# Patient Record
Sex: FEMALE | Race: WHITE | NOT HISPANIC OR LATINO | Employment: OTHER | URBAN - METROPOLITAN AREA
[De-identification: names, ages, dates, MRNs, and addresses within clinical notes are randomized per-mention and may not be internally consistent; named-entity substitution may affect disease eponyms.]

---

## 2017-01-05 ENCOUNTER — APPOINTMENT (OUTPATIENT)
Dept: PHARMACY | Facility: HOSPITAL | Age: 82
End: 2017-01-05

## 2017-01-12 ENCOUNTER — ANTICOAGULATION VISIT (OUTPATIENT)
Dept: PHARMACY | Facility: HOSPITAL | Age: 82
End: 2017-01-12

## 2017-01-12 DIAGNOSIS — I48.91 ATRIAL FIBRILLATION, UNSPECIFIED TYPE (HCC): ICD-10-CM

## 2017-01-12 LAB — INR PPP: 1.7 (ref 0.9–1.1)

## 2017-01-12 PROCEDURE — G0463 HOSPITAL OUTPT CLINIC VISIT: HCPCS

## 2017-01-12 PROCEDURE — 36416 COLLJ CAPILLARY BLOOD SPEC: CPT

## 2017-01-12 PROCEDURE — 85610 PROTHROMBIN TIME: CPT

## 2017-01-12 NOTE — PROGRESS NOTES
Anticoagulation Clinic Progress Note  Indication: afib  Referring Provider: Eddie  Goal INR: 2-3  Current Drug Interactions: levothyroxine      Anticoagulation Clinic INR History:  Date 9/1 9/29 10/6 10/31 11/15 11/22 12/13 1/12   Total Weekly Dose 9mg 9mg 8mg 14mg 10.5 mg 5mg (3 held doses) 8mg (1 missed dose) 8mg   INR 2.4 3.8 2.2 3.7 (outside facility) 5.8, 5.7 2.8 2.1 1.7     Date           Total Weekly Dose 9mg          INR             Clinic Interview:  Tablet Strength: pt has 1mg tablets  Current Dose: pt's daughter verified 1mg daily except 2mg Saturday  Patient Findings      Negatives Signs/symptoms of thrombosis, Signs/symptoms of bleeding, Laboratory test error suspected, Change in health, Change in alcohol use, Change in activity, Upcoming invasive procedure, Emergency department visit, Upcoming dental procedure, Missed doses, Extra doses, Change in medications, Change in diet/appetite, Hospital admission, Bruising, Other complaints         Plan:  1. INR is subtherapeutic today. Will instruct pt to BOOST her dose today with 1.5mg, then increase dose to 1mg daily except 2mg MonFri.  2. RTC in 2 weeks.  3. No recent changes to medications.  4. Verbal and written information provided. Pt and daughter express understanding and have no further questions at this time.    Ann Cowden, PharmD  1/12/2017  11:23 AM

## 2017-01-12 NOTE — MR AVS SNAPSHOT
Sarah Kennedy   1/12/2017 11:30 AM   Anticoagulation Visit    Dept Phone:  [phone]   Encounter #:  [id number]    Provider:  [facility]   Department:  [facility] ANTICOAGULATION CLINIC                Your Full Care Plan              Your Updated Medication List          This list is accurate as of: 1/12/17 11:31 AM.  Always use your most recent med list.                acetaminophen 325 MG tablet   Commonly known as:  TYLENOL   Take 2 tablets by mouth Every 4 (Four) Hours As Needed for mild pain (1-3).       aspirin 81 MG chewable tablet   Chew 1 tablet Daily.       carvedilol 6.25 MG tablet   Commonly known as:  COREG   Take 1 tablet by mouth Every 12 (Twelve) Hours.       DAILY MULTIVITAMIN PO       gabapentin 100 MG capsule   Commonly known as:  NEURONTIN       levothyroxine 75 MCG tablet   Commonly known as:  SYNTHROID, LEVOTHROID       lisinopril 2.5 MG tablet   Commonly known as:  PRINIVIL,ZESTRIL       rosuvastatin 20 MG tablet   Commonly known as:  CRESTOR   Take 1 tablet by mouth Every Night.       sennosides-docusate sodium 8.6-50 MG tablet   Commonly known as:  SENOKOT-S   Take 2 tablets by mouth 2 (Two) Times a Day As Needed for constipation.       thiamine 100 MG tablet   Commonly known as:  VITAMIN B-1       torsemide 10 MG tablet   Commonly known as:  DEMADEX   Take 1 tablet by mouth Daily.       warfarin 1 MG tablet   Commonly known as:  COUMADIN               We Performed the Following     POC INR       You Were Diagnosed With        Codes Comments    Atrial fibrillation, unspecified type     ICD-10-CM: I48.91  ICD-9-CM: 427.31       Instructions     None    Patient Instructions History      Anticoagulation Summary as of 1/12/2017     INR goal 2.0-3.0   Today's INR 1.70!   Next INR check 1/26/2017    Indications   Atrial fibrillation [I48.91] [I48.91]         January 2017 Details    Sun Mon Tue Wed Thu Fri Sat     1               2               3                  4               5               6               7                 8               9               10               11               12      1.5 mg   See details      13      2 mg         14      1 mg           15      1 mg         16      2 mg         17      1 mg         18      1 mg         19      1 mg         20      2 mg         21      1 mg           22      1 mg         23      2 mg         24      1 mg         25      1 mg         26      1 mg         27               28                 29               30               31                    Date Details    This INR check       Date of next INR:  2017           Upcoming Appointments     Visit Type Date Time Department    ANTI [facility] - FOLLOW UP 2017 11:30 AM  [facility]    [facility] - FOLLOW UP 2017 11:00 AM  [facility]    FOLLOW UP 2017  1:30 PM [facility]      Mobile [employer] Signup     [facility] [employer] allows you to send messages to your doctor, view your test results, renew your prescriptions, schedule appointments, and more. To sign up, go to [email or web address] and click on the Sign Up Now link in the New User? box. Enter your [employer] Activation Code exactly as it appears below along with the last four digits of your Social Security Number and your Date of Birth () to complete the sign-up process. If you do not sign up before the expiration date, you must request a new code.    [employer] Activation Code: [email or web address]  Expires: 2017 11:31 AM    If you have questions, you can email [email or web address] or call [phone] to talk to our [employer] staff. Remember, [employer] is NOT to be used for urgent needs. For medical emergencies, dial 911.               Other Info from Your Visit           Your Appointments     2017 11:00 AM EST   Anti Coag - Follow Up with ANTI COAG CLINIC   [facility] ANTICOAGULATION CLINIC (--)    [address]  [facility] [address]   [phone]            Jun 08, 2017  1:30 PM EDT   Follow Up with Jennifer Morgan MD   [facility] CARDIOLOGY (--)    [address]  [facility] [address]   [phone]           Arrive 15 minutes prior to appointment.              Allergies     Atorvastatin        Vital Signs     Smoking Status                   Never Smoker           Results     POC INR      Component Value Standard Range & Units    INR 1.70 0.9 - 1.1

## 2017-01-26 ENCOUNTER — ANTICOAGULATION VISIT (OUTPATIENT)
Dept: PHARMACY | Facility: HOSPITAL | Age: 82
End: 2017-01-26

## 2017-01-26 DIAGNOSIS — I48.91 ATRIAL FIBRILLATION, UNSPECIFIED TYPE (HCC): Primary | ICD-10-CM

## 2017-01-26 LAB — INR PPP: 3 (ref 0.9–1.1)

## 2017-01-26 PROCEDURE — G0463 HOSPITAL OUTPT CLINIC VISIT: HCPCS

## 2017-01-26 PROCEDURE — 85610 PROTHROMBIN TIME: CPT

## 2017-01-26 PROCEDURE — 36416 COLLJ CAPILLARY BLOOD SPEC: CPT

## 2017-01-26 NOTE — MR AVS SNAPSHOT
Sarah Kennedy   1/26/2017 11:00 AM   Anticoagulation Visit    Dept Phone:  [phone]   Encounter #:  [id number]    Provider:  [facility]   Department:  [facility] ANTICOAGULATION CLINIC                Your Full Care Plan              Your Updated Medication List          This list is accurate as of: 1/26/17 11:30 AM.  Always use your most recent med list.                acetaminophen 325 MG tablet   Commonly known as:  TYLENOL   Take 2 tablets by mouth Every 4 (Four) Hours As Needed for mild pain (1-3).       aspirin 81 MG chewable tablet   Chew 1 tablet Daily.       carvedilol 6.25 MG tablet   Commonly known as:  COREG   Take 1 tablet by mouth Every 12 (Twelve) Hours.       DAILY MULTIVITAMIN PO       gabapentin 100 MG capsule   Commonly known as:  NEURONTIN       levothyroxine 75 MCG tablet   Commonly known as:  SYNTHROID, LEVOTHROID       lisinopril 2.5 MG tablet   Commonly known as:  PRINIVIL,ZESTRIL       rosuvastatin 20 MG tablet   Commonly known as:  CRESTOR   Take 1 tablet by mouth Every Night.       sennosides-docusate sodium 8.6-50 MG tablet   Commonly known as:  SENOKOT-S   Take 2 tablets by mouth 2 (Two) Times a Day As Needed for constipation.       thiamine 100 MG tablet   Commonly known as:  VITAMIN B-1       torsemide 10 MG tablet   Commonly known as:  DEMADEX   Take 1 tablet by mouth Daily.       warfarin 1 MG tablet   Commonly known as:  COUMADIN               We Performed the Following     POC INR       You Were Diagnosed With        Codes Comments    Atrial fibrillation, unspecified type    -  Primary ICD-10-CM: I48.91  ICD-9-CM: 427.31       Instructions     None    Patient Instructions History      Anticoagulation Summary as of 1/26/2017     INR goal 2.0-3.0   Today's INR 3.00   Next INR check 2/16/2017    Indications   Atrial fibrillation [I48.91] [I48.91]         January 2017 Details    Sun Mon Tue Wed Thu Fri Sat     1               2               3               4               5               6               7                 8               9               10               11               12               13               14                 15               16               17               18               19               20               21                 22               23               24               25               26      1 mg   See details      27      2 mg         28      1 mg           29      1 mg         30      2 mg         31      1 mg              Date Details    This INR check                 2017 Details    Sun Mon Tue Wed Thu Fri Sat        1      1 mg         2      1 mg         3      2 mg         4      1 mg           5      1 mg         6      2 mg         7      1 mg         8      1 mg         9      1 mg         10      2 mg         11      1 mg           12      1 mg         13      2 mg         14      1 mg         15      1 mg         16      1 mg         17               18                 19               20               21               22               23               24               25                 26               27               28                    Date Details   No additional details    Date of next INR:  2017           Upcoming Appointments     Visit Type Date Time Department    ANT[facility] - FOLLOW UP 2017 11:00 AM  [facility]    [facility] - FOLLOW UP 2017  1:30 PM  [facility]    FOLLOW UP 2017  1:30 PM [facility]      [employer] Signup     [facility] [employer] allows you to send messages to your doctor, view your test results, renew your prescriptions, schedule appointments, and more. To sign up, go to [employer] and click on the Sign Up Now link in the New User? box. Enter your [employer] Activation Code exactly as it appears below along with the last four digits of your Social Security Number and your Date of Birth () to  complete the sign-up process. If you do not sign up before the expiration date, you must request a new code.    [employer] Activation Code: [email or web address]  Expires: 1/26/2017 11:31 AM    If you have questions, you can email [email or web address] or call [phone] to talk to our [employer] staff. Remember, [employer] is NOT to be used for urgent needs. For medical emergencies, dial 911.               Other Info from Your Visit           Your Appointments     Feb 16, 2017  1:30 PM EST   Anti Coag - Follow Up with ANTI COAG CLINIC   [facility] ANTICOAGULATION CLINIC (--)    [address]  [address]  [facility] [address]   [phone]            Jun 08, 2017  1:30 PM EDT   Follow Up with Jennifer Morgan MD   [facility] [place] [facility]ARDIOLOGY (--)    [address]  [facility] [address]   [phone]           Arrive 15 minutes prior to appointment.              Allergies     Atorvastatin        Vital Signs     Smoking Status                   Never Smoker           Results     POC INR      Component Value Standard Range & Units    INR 3.00 0.9 - 1.1

## 2017-01-26 NOTE — PROGRESS NOTES
Anticoagulation Clinic Progress Note  Indication: afib  Referring Provider: Eddie  Goal INR: 2-3  Current Drug Interactions: levothyroxine      Anticoagulation Clinic INR History:  Date 9/1 9/29 10/6 10/31 11/15 11/22 12/13 1/12   Total Weekly Dose 9mg 9mg 8mg 14mg 10.5 mg 5mg (3 held doses) 8mg (1 missed dose) 8mg   INR 2.4 3.8 2.2 3.7 (outside facility) 5.8, 5.7 2.8 2.1 1.7     Date 1/26          Total Weekly Dose 9mg          INR 3.0            Clinic Interview:  Tablet Strength: pt has 1mg tablets  Current Dose: pt's daughter verified 1mg daily except 2mg Saturday  Patient Findings       Negatives Signs/symptoms of thrombosis, Signs/symptoms of bleeding, Laboratory test error suspected, Change in health, Change in alcohol use, Change in activity, Upcoming invasive procedure, Emergency department visit, Upcoming dental procedure, Missed doses, Extra doses, Change in medications, Change in diet/appetite, Hospital admission, Bruising, Other complaints           Plan:  1. INR is therapeutic today. Instruct pt to continue 1mg daily except 2mg MonFri. Gave her information regarding vit K foods. Pt may have one more serving of low Vit K food per week. She tries to keep diet consistent.   2. RTC in 2 weeks.  3. No recent changes to medications.  4. Verbal and written information provided. Pt and daughter express understanding and have no further questions at this time.    Gloria Jimenez, PharmD  1/26/2017  11:22 AM

## 2017-02-06 RX ORDER — WARFARIN SODIUM 1 MG/1
TABLET ORAL
Qty: 45 TABLET | Refills: 2 | Status: SHIPPED | OUTPATIENT
Start: 2017-02-06 | End: 2017-06-02 | Stop reason: SDUPTHER

## 2017-02-16 ENCOUNTER — APPOINTMENT (OUTPATIENT)
Dept: PHARMACY | Facility: HOSPITAL | Age: 82
End: 2017-02-16

## 2017-02-16 ENCOUNTER — ANTICOAGULATION VISIT (OUTPATIENT)
Dept: PHARMACY | Facility: HOSPITAL | Age: 82
End: 2017-02-16

## 2017-02-16 DIAGNOSIS — I48.91 ATRIAL FIBRILLATION, UNSPECIFIED TYPE (HCC): ICD-10-CM

## 2017-02-16 LAB — INR PPP: 2 (ref 0.9–1.1)

## 2017-02-16 PROCEDURE — G0463 HOSPITAL OUTPT CLINIC VISIT: HCPCS

## 2017-02-16 PROCEDURE — 85610 PROTHROMBIN TIME: CPT

## 2017-02-16 PROCEDURE — 36416 COLLJ CAPILLARY BLOOD SPEC: CPT

## 2017-02-16 NOTE — PROGRESS NOTES
Anticoagulation Clinic Progress Note  Indication: afib  Referring Provider: Eddie  Goal INR: 2-3  Current Drug Interactions: levothyroxine      Anticoagulation Clinic INR History:  Date 9/1 9/29 10/6 10/31 11/15 11/22 12/13 1/12   Total Weekly Dose 9mg 9mg 8mg 14mg 10.5 mg 5mg  8mg  8mg   INR 2.4 3.8 2.2 3.7 (outside facility) 5.8, 5.7 2.8 2.1 1.7         3 held doses 1 missed dose      Date 1/26 2/16         Total Weekly Dose 9mg 9mg         INR 3.0 2.0         Notes  2 missed doses           Clinic Interview:  Tablet Strength: pt has 1mg tablets  Current Dose: pt's daughter verified 1mg daily except 2mg MonFri  Patient Findings      Positives Signs/symptoms of bleeding, Missed doses     Negatives Signs/symptoms of thrombosis, Laboratory test error suspected, Change in health, Change in alcohol use, Change in activity, Upcoming invasive procedure, Emergency department visit, Upcoming dental procedure, Extra doses, Change in medications, Change in diet/appetite, Hospital admission, Bruising, Other complaints     Comments Pt missed two (1mg) doses a couple weeks ago. Also reports having a bad nose bleed last night, for several hours through the night. Per pt's daughter, this is not uncommon. No other changes reported.         Plan:  1. INR is therapeutic today, at the low end of normal -- likely dropped with missed doses two weeks ago and is on its way back up. Will instruct pt/pt's dtr to continue current dose 1mg daily except 2mg MonFri.  2. RTC in 3 weeks.  3. No recent changes to medications. Instructed pt's dtr to call if her mother misses any doses in the future.  4. Pt declines refills at this time.  5. Verbal and written information provided. Pt and daughter express understanding and have no further questions at this time.    Ann Cowden, PharmD  2/16/2017  3:00 PM

## 2017-03-09 ENCOUNTER — ANTICOAGULATION VISIT (OUTPATIENT)
Dept: PHARMACY | Facility: HOSPITAL | Age: 82
End: 2017-03-09

## 2017-03-09 DIAGNOSIS — I48.91 ATRIAL FIBRILLATION, UNSPECIFIED TYPE (HCC): ICD-10-CM

## 2017-03-09 LAB
INR PPP: 1.8 (ref 0.91–1.09)
PROTHROMBIN TIME: 21.7 SECONDS (ref 10–13.8)

## 2017-03-09 PROCEDURE — 36416 COLLJ CAPILLARY BLOOD SPEC: CPT

## 2017-03-09 PROCEDURE — G0463 HOSPITAL OUTPT CLINIC VISIT: HCPCS

## 2017-03-09 PROCEDURE — 85610 PROTHROMBIN TIME: CPT

## 2017-03-09 NOTE — PROGRESS NOTES
Anticoagulation Clinic Progress Note  Indication: afib  Referring Provider: Eddie  Goal INR: 2-3  Current Drug Interactions: levothyroxine      Anticoagulation Clinic INR History:  Date 9/1 9/29 10/6 10/31 11/15 11/22 12/13 1/12   Total Weekly Dose 9mg 9mg 8mg 14mg 10.5 mg 5mg  8mg  8mg   INR 2.4 3.8 2.2 3.7 (outside facility) 5.8, 5.7 2.8 2.1 1.7         3 held doses 1 missed dose      Date 1/26 2/16 3/9       Total Weekly Dose 9mg 9mg 9mg       INR 3.0 2.0 1.8       Notes  2 missed doses 1-2 missed doses         Clinic Interview:  Tablet Strength: pt has 1mg tablets  Current Dose: pt's daughter verified 1mg daily except 2mg MonFri  Patient Findings      Positives Signs/symptoms of bleeding, Missed doses     Negatives Signs/symptoms of thrombosis, Laboratory test error suspected, Change in health, Change in alcohol use, Change in activity, Upcoming invasive procedure, Emergency department visit, Upcoming dental procedure, Extra doses, Change in medications, Change in diet/appetite, Hospital admission, Bruising, Other complaints     Comments Pt missed at least 1 dose last Monday (2mg), possibly missed dose that Sunday (1mg), as well. She has been having nosebleeds again at various times throughout the day. She has had these often in the past, so we discussed preventive measures such as using saline nasal spray to ensure adequate moisture -- may also need to talk with PCP about possibility of cauterization.         Plan:  1. INR is subtherapeutic today, likely a result of missed dose(s) last week. Will instruct pt to BOOST her dose today (2mg), then continue current dose 1mg daily except 2mg MonFri.  2. RTC in 2 weeks.  3. No recent changes to medications.  4. Pt declines refills at this time.  5. Verbal and written information provided. Pt and daughter express understanding and have no further questions at this time.    Ann Cowden, PharmD  3/9/2017  1:06 PM

## 2017-03-20 ENCOUNTER — TELEPHONE (OUTPATIENT)
Dept: CARDIOLOGY | Facility: CLINIC | Age: 82
End: 2017-03-20

## 2017-03-20 RX ORDER — DILTIAZEM HCL 90 MG
TABLET ORAL
Qty: 180 TABLET | Refills: 0 | OUTPATIENT
Start: 2017-03-20

## 2017-03-20 NOTE — TELEPHONE ENCOUNTER
Received refill request from pharmacy for Diltiazem- i went through the patient's chart and found that it was stopped in the hospital in October- carvedilol was increased to 6.25 mg BID-   PT's daughter was not made aware of this change when they went home- so she has been giving Ms. Brent rose 3.125 mg BID (it was decreased by PCP due to low BP and HR), Diltiazem 90 mg BID and lisinopril 2.5 mg daily-     Lolly and I went through everything in regards to meds- we will have PT to stop Diltiazem as ordered in the hospital- increase coreg to 6.25 mg BID as ordered -   Instructed daughter to monitor BP and HR and to call me in 1-2 weeks with an update-    Lolly at [facility] spoke with the PT's daughter Stephanie in regards to these changes-

## 2017-03-23 ENCOUNTER — ANTICOAGULATION VISIT (OUTPATIENT)
Dept: PHARMACY | Facility: HOSPITAL | Age: 82
End: 2017-03-23

## 2017-03-23 DIAGNOSIS — I48.91 ATRIAL FIBRILLATION, UNSPECIFIED TYPE (HCC): ICD-10-CM

## 2017-03-23 LAB
INR PPP: 2 (ref 0.91–1.09)
PROTHROMBIN TIME: 23.8 SECONDS (ref 10–13.8)

## 2017-03-23 PROCEDURE — 85610 PROTHROMBIN TIME: CPT

## 2017-03-23 PROCEDURE — G0463 HOSPITAL OUTPT CLINIC VISIT: HCPCS

## 2017-03-23 PROCEDURE — 36416 COLLJ CAPILLARY BLOOD SPEC: CPT

## 2017-03-23 NOTE — PROGRESS NOTES
Anticoagulation Clinic Progress Note  Indication: afib  Referring Provider: Eddie  Goal INR: 2-3  Current Drug Interactions: levothyroxine      Anticoagulation Clinic INR History:  Date 9/1 9/29 10/6 10/31 11/15 11/22 12/13 1/12   Total Weekly Dose 9mg 9mg 8mg 14mg 10.5 mg 5mg  8mg  8mg   INR 2.4 3.8 2.2 3.7 (outside facility) 5.8, 5.7 2.8 2.1 1.7         3 held doses 1 missed dose      Date 1/26 2/16 3/9 3/23      Total Weekly Dose 9mg 9mg 9mg 9mg      INR 3.0 2.0 1.8 2.0      Notes  2 missed doses 1-2 missed doses 1 missed dose        Clinic Interview:  Tablet Strength: pt has 1mg tablets  Current Dose: 1mg daily except 2mg MonFri  Patient Findings      Positives Signs/symptoms of bleeding, Missed doses, Change in medications     Negatives Signs/symptoms of thrombosis, Laboratory test error suspected, Change in health, Change in alcohol use, Change in activity, Upcoming invasive procedure, Emergency department visit, Upcoming dental procedure, Extra doses, Change in diet/appetite, Hospital admission, Bruising, Other complaints     Comments Missed 1 dose of warfarin last week. Nosebleeds continue -- pt's dtr believes she is going to her PCP next month, so they will inquire about referral to ENT for consideration of cauterization. Pt was mistakenly taking diltiazem and carvedilol -- dilt now d/c'd.         Plan:  1. INR is therapeutic today at the low end of goal. Will instruct pt to BOOST her dose today (1.5mg), then continue warfarin 1mg daily except 2mg MonFri.  2. RTC in 4 weeks. If low-normal again at follow up, will consider dose increase.  3. See note from Dr Morgan 3/20 re: diltiazem + carvedilol confusion. Pt no longer taking dilt, now just on carvedilol 6.25mg BID.  4. Pt declines refills at this time.  5. Verbal and written information provided. Pt and daughter express understanding and have no further questions at this time.      Ann Cowden, PharmD  3/23/2017  1:06 PM

## 2017-03-24 ENCOUNTER — APPOINTMENT (OUTPATIENT)
Dept: GENERAL RADIOLOGY | Facility: HOSPITAL | Age: 82
End: 2017-03-24

## 2017-03-24 ENCOUNTER — HOSPITAL ENCOUNTER (EMERGENCY)
Facility: HOSPITAL | Age: 82
Discharge: HOME | End: 2017-03-24
Attending: EMERGENCY MEDICINE | Admitting: EMERGENCY MEDICINE

## 2017-03-24 VITALS
TEMPERATURE: 97.6 F | SYSTOLIC BLOOD PRESSURE: 129 MMHG | OXYGEN SATURATION: 98 % | WEIGHT: 125 LBS | DIASTOLIC BLOOD PRESSURE: 70 MMHG | HEART RATE: 100 BPM | BODY MASS INDEX: 24.54 KG/M2 | RESPIRATION RATE: 16 BRPM | HEIGHT: 60 IN

## 2017-03-24 DIAGNOSIS — E86.9 VOLUME DEPLETION: Primary | ICD-10-CM

## 2017-03-24 LAB
ALBUMIN SERPL-MCNC: 3.8 G/DL (ref 3.2–4.8)
ALBUMIN/GLOB SERPL: 1.6 G/DL (ref 1.5–2.5)
ALP SERPL-CCNC: 80 U/L (ref 25–100)
ALT SERPL W P-5'-P-CCNC: 10 U/L (ref 7–40)
ANION GAP SERPL CALCULATED.3IONS-SCNC: 6 MMOL/L (ref 3–11)
AST SERPL-CCNC: 25 U/L (ref 0–33)
BASOPHILS # BLD AUTO: 0.02 10*3/MM3 (ref 0–0.2)
BASOPHILS NFR BLD AUTO: 0.4 % (ref 0–1)
BILIRUB SERPL-MCNC: 0.5 MG/DL (ref 0.3–1.2)
BILIRUB UR QL STRIP: NEGATIVE
BNP SERPL-MCNC: 216 PG/ML (ref 0–100)
BUN BLD-MCNC: 25 MG/DL (ref 9–23)
BUN/CREAT SERPL: 22.7 (ref 7–25)
CALCIUM SPEC-SCNC: 9.1 MG/DL (ref 8.7–10.4)
CHLORIDE SERPL-SCNC: 107 MMOL/L (ref 99–109)
CLARITY UR: CLEAR
CO2 SERPL-SCNC: 27 MMOL/L (ref 20–31)
COLOR UR: YELLOW
CREAT BLD-MCNC: 1.1 MG/DL (ref 0.6–1.3)
DEPRECATED RDW RBC AUTO: 50.8 FL (ref 37–54)
EOSINOPHIL # BLD AUTO: 0.17 10*3/MM3 (ref 0.1–0.3)
EOSINOPHIL NFR BLD AUTO: 3.6 % (ref 0–3)
ERYTHROCYTE [DISTWIDTH] IN BLOOD BY AUTOMATED COUNT: 13.8 % (ref 11.3–14.5)
GFR SERPL CREATININE-BSD FRML MDRD: 47 ML/MIN/1.73
GLOBULIN UR ELPH-MCNC: 2.4 GM/DL
GLUCOSE BLD-MCNC: 98 MG/DL (ref 70–100)
GLUCOSE UR STRIP-MCNC: NEGATIVE MG/DL
HCT VFR BLD AUTO: 31.4 % (ref 34.5–44)
HGB BLD-MCNC: 10.2 G/DL (ref 11.5–15.5)
HGB UR QL STRIP.AUTO: NEGATIVE
HOLD SPECIMEN: NORMAL
HOLD SPECIMEN: NORMAL
IMM GRANULOCYTES # BLD: 0.01 10*3/MM3 (ref 0–0.03)
IMM GRANULOCYTES NFR BLD: 0.2 % (ref 0–0.6)
INR PPP: 1.71
KETONES UR QL STRIP: NEGATIVE
LEUKOCYTE ESTERASE UR QL STRIP.AUTO: NEGATIVE
LYMPHOCYTES # BLD AUTO: 1.14 10*3/MM3 (ref 0.6–4.8)
LYMPHOCYTES NFR BLD AUTO: 24.5 % (ref 24–44)
MAGNESIUM SERPL-MCNC: 2.2 MG/DL (ref 1.3–2.7)
MCH RBC QN AUTO: 32.8 PG (ref 27–31)
MCHC RBC AUTO-ENTMCNC: 32.5 G/DL (ref 32–36)
MCV RBC AUTO: 101 FL (ref 80–99)
MONOCYTES # BLD AUTO: 0.59 10*3/MM3 (ref 0–1)
MONOCYTES NFR BLD AUTO: 12.7 % (ref 0–12)
NEUTROPHILS # BLD AUTO: 2.73 10*3/MM3 (ref 1.5–8.3)
NEUTROPHILS NFR BLD AUTO: 58.6 % (ref 41–71)
NITRITE UR QL STRIP: NEGATIVE
PH UR STRIP.AUTO: <=5 [PH] (ref 5–8)
PLATELET # BLD AUTO: 183 10*3/MM3 (ref 150–450)
PMV BLD AUTO: 9.7 FL (ref 6–12)
POTASSIUM BLD-SCNC: 4.1 MMOL/L (ref 3.5–5.5)
PROT SERPL-MCNC: 6.2 G/DL (ref 5.7–8.2)
PROT UR QL STRIP: NEGATIVE
PROTHROMBIN TIME: 18.9 SECONDS (ref 9.6–11.5)
RBC # BLD AUTO: 3.11 10*6/MM3 (ref 3.89–5.14)
SODIUM BLD-SCNC: 140 MMOL/L (ref 132–146)
SP GR UR STRIP: 1.01 (ref 1–1.03)
TROPONIN I SERPL-MCNC: 0 NG/ML (ref 0–0.07)
TROPONIN I SERPL-MCNC: <0.006 NG/ML
UROBILINOGEN UR QL STRIP: NORMAL
WBC NRBC COR # BLD: 4.66 10*3/MM3 (ref 3.5–10.8)
WHOLE BLOOD HOLD SPECIMEN: NORMAL
WHOLE BLOOD HOLD SPECIMEN: NORMAL

## 2017-03-24 PROCEDURE — 84484 ASSAY OF TROPONIN QUANT: CPT | Performed by: EMERGENCY MEDICINE

## 2017-03-24 PROCEDURE — 81003 URINALYSIS AUTO W/O SCOPE: CPT | Performed by: EMERGENCY MEDICINE

## 2017-03-24 PROCEDURE — 85610 PROTHROMBIN TIME: CPT | Performed by: EMERGENCY MEDICINE

## 2017-03-24 PROCEDURE — 99285 EMERGENCY DEPT VISIT HI MDM: CPT

## 2017-03-24 PROCEDURE — 84484 ASSAY OF TROPONIN QUANT: CPT

## 2017-03-24 PROCEDURE — 71010 HC CHEST PA OR AP: CPT

## 2017-03-24 PROCEDURE — 80053 COMPREHEN METABOLIC PANEL: CPT | Performed by: EMERGENCY MEDICINE

## 2017-03-24 PROCEDURE — 96361 HYDRATE IV INFUSION ADD-ON: CPT

## 2017-03-24 PROCEDURE — 83735 ASSAY OF MAGNESIUM: CPT | Performed by: EMERGENCY MEDICINE

## 2017-03-24 PROCEDURE — 96360 HYDRATION IV INFUSION INIT: CPT

## 2017-03-24 PROCEDURE — 93005 ELECTROCARDIOGRAM TRACING: CPT | Performed by: EMERGENCY MEDICINE

## 2017-03-24 PROCEDURE — 83880 ASSAY OF NATRIURETIC PEPTIDE: CPT | Performed by: EMERGENCY MEDICINE

## 2017-03-24 PROCEDURE — 93005 ELECTROCARDIOGRAM TRACING: CPT

## 2017-03-24 PROCEDURE — P9612 CATHETERIZE FOR URINE SPEC: HCPCS

## 2017-03-24 PROCEDURE — 85025 COMPLETE CBC W/AUTO DIFF WBC: CPT | Performed by: EMERGENCY MEDICINE

## 2017-03-24 RX ORDER — SODIUM CHLORIDE 0.9 % (FLUSH) 0.9 %
10 SYRINGE (ML) INJECTION AS NEEDED
Status: DISCONTINUED | OUTPATIENT
Start: 2017-03-24 | End: 2017-03-24 | Stop reason: HOSPADM

## 2017-03-24 RX ADMIN — SODIUM CHLORIDE 500 ML: 9 INJECTION, SOLUTION INTRAVENOUS at 13:18

## 2017-03-24 RX ADMIN — SODIUM CHLORIDE 500 ML: 9 INJECTION, SOLUTION INTRAVENOUS at 10:20

## 2017-03-24 NOTE — ED PROVIDER NOTES
Subjective   HPI Comments: Sarah Kennedy is a 88 y.o.female who presents to the ED c/o sudden onset weakness, which occurred earlier today. Pt states she was standing at the bathroom mirror when her legs suddenly became weak and gave out. She denies any injury from collapsing at that time or any associated dizziness. Upon examination in the ED, pt reports continued BLE weakness when she stands up, but denies any dizziness, unilateral weakness, cough, fever, nausea or vomiting.    PMHx of A-fib, TIA and MI.    Patient is a 88 y.o. female presenting with weakness.   History provided by:  Patient and relative  Weakness - Generalized   Severity:  Moderate  Onset quality:  Sudden  Timing:  Intermittent  Chronicity:  New  Relieved by:  Nothing  Associated symptoms: no cough, no dizziness, no fever, no nausea and no vomiting    Risk factors: coronary artery disease and heart disease        Review of Systems   Constitutional: Negative for fever.   Respiratory: Negative for cough.    Gastrointestinal: Negative for nausea and vomiting.   Neurological: Negative for dizziness.   All other systems reviewed and are negative.      Past Medical History:   Diagnosis Date   • A-fib    • Cancer    • Chronic back pain 10/17/2016    Chronic back and left leg pain, pain management per Dr. Lynch.   • Coronary artery disease 10/17/2016    History of myocardial infarction, 1984. Left heart catheterization, 2007, to unknown vessel. Nuclear stress test, 03/22/2010:  Very small sized defect and mild intensity in apical portion of anterior septal region.  EF 68%. Non-ST elevation MI, 02/22/2016 with cardiac catheterization by Lobito Steele MD with placement of a drug-eluting stent to the mid-LAD, drug-eluting to the mid-RCA, and a drug-eluting stent to the PDA.  The stent in the LAD, 2.5 x 28 mm Xience drug-eluting stent; mid-right, 3.0 x 33 mm Xience drug-eluting stent.  PDA, 2.5 x 28 mm Xience drug-eluting stent.  EF 30% with  anterolateral apical and inferior hypokinesis.     • Heart attack    • Hyperlipidemia    • Hypertension    • Lymphoma    • Lymphoma     Non-Hodgkin’s lymphoma   • Stroke    • Syncope 10/17/2016    Presyncope        Allergies   Allergen Reactions   • Atorvastatin        Past Surgical History:   Procedure Laterality Date   • CARDIAC CATHETERIZATION N/A 10/12/2016    Procedure: Left Heart Cath;  Surgeon: Nikia Chambers MD;  Location: [facility] CATH INVASIVE LOCATION;  Service:    • ESOPHAGEAL DILATATION     • TONSILLECTOMY         Family History   Problem Relation Age of Onset   • Heart disease Father    • Heart attack Father    • Heart disease Maternal Grandmother    • Heart disease Maternal Grandfather    • No Known Problems Mother        Social History     Social History   • Marital status:      Spouse name: N/A   • Number of children: N/A   • Years of education: N/A     Social History Main Topics   • Smoking status: Never Smoker   • Smokeless tobacco: Never Used   • Alcohol use 4.2 oz/week     7 Glasses of wine per week      Comment: 1-2 glasses of wine / cocktail per day   • Drug use: No   • Sexual activity: No     Other Topics Concern   • None     Social History Narrative         Objective   Physical Exam   Constitutional: She is oriented to person, place, and time. She appears well-developed and well-nourished.   HENT:   Head: Normocephalic and atraumatic.   Right Ear: External ear normal.   Left Ear: External ear normal.   Nose: Nose normal.   Mouth/Throat: Mucous membranes are dry.   Eyes: Conjunctivae are normal.   Neck: Normal range of motion. Neck supple.   Cardiovascular: Normal rate, regular rhythm and normal heart sounds.  Exam reveals no gallop and no friction rub.    No murmur heard.  Hypotensive.   Pulmonary/Chest: Effort normal and breath sounds normal. No respiratory distress. She has no wheezes. She has no rales.   Abdominal: Soft. There is no tenderness.   Musculoskeletal: Normal range of  motion.   Neurological: She is alert and oriented to person, place, and time.   Skin: Skin is warm and dry.   Psychiatric: She has a normal mood and affect. Her behavior is normal. Judgment and thought content normal.   Nursing note and vitals reviewed.      Procedures         ED Course  ED Course   Comment By Time   We are awaiting a urinalysis.  I reviewed her labs.  Be when is 25 with a creatinine of 1.1.  Her last be when was 15 with a creatinine of 0.7.  Her BNP is mildly elevated although her last one was twice what it is today.  She is orthostatic with her heart rate going from  when she stood up and she did become dizzy upon standing.  I have ordered a 500 cc bolus we'll order an additional 500 cc of fluids Alexis Chang MD 03/24 1134   Mrs. Kennedy tells me she continues to feel fine and that her symptoms only occur when she stands up.  She's getting her second 500 cc bolus right now and is very hungry.  I took her lunch as well as a Gatorade.  We will reassess her orthostatics once she has completed those.  She has not taken her Demadex today and I asked her to hold that and not take it today. Alexis Chang MD 03/24 1323   Mrs. Kennedy was able to stand up without getting dizzy her heart rate went up 10 beats a minute her blood pressure remained the same.  She has not taken her Demadex today and so we'll have her hold that today and drink extra fluids. Alexis Chang MD 03/24 1436       Recent Results (from the past 24 hour(s))   Comprehensive Metabolic Panel    Collection Time: 03/24/17  9:40 AM   Result Value Ref Range    Glucose 98 70 - 100 mg/dL    BUN 25 (H) 9 - 23 mg/dL    Creatinine 1.10 0.60 - 1.30 mg/dL    Sodium 140 132 - 146 mmol/L    Potassium 4.1 3.5 - 5.5 mmol/L    Chloride 107 99 - 109 mmol/L    CO2 27.0 20.0 - 31.0 mmol/L    Calcium 9.1 8.7 - 10.4 mg/dL    Total Protein 6.2 5.7 - 8.2 g/dL    Albumin 3.80 3.20 - 4.80 g/dL    ALT (SGPT) 10 7 - 40 U/L    AST (SGOT) 25 0 - 33 U/L     Alkaline Phosphatase 80 25 - 100 U/L    Total Bilirubin 0.5 0.3 - 1.2 mg/dL    eGFR Non African Amer 47 (L) >60 mL/min/1.73    Globulin 2.4 gm/dL    A/G Ratio 1.6 1.5 - 2.5 g/dL    BUN/Creatinine Ratio 22.7 7.0 - 25.0    Anion Gap 6.0 3.0 - 11.0 mmol/L   Magnesium    Collection Time: 03/24/17  9:40 AM   Result Value Ref Range    Magnesium 2.2 1.3 - 2.7 mg/dL   Light Blue Top    Collection Time: 03/24/17  9:40 AM   Result Value Ref Range    Extra Tube hold for add-on    Green Top (Gel)    Collection Time: 03/24/17  9:40 AM   Result Value Ref Range    Extra Tube Hold for add-ons.    Gold Top - SST    Collection Time: 03/24/17  9:40 AM   Result Value Ref Range    Extra Tube Hold for add-ons.    Protime-INR    Collection Time: 03/24/17  9:40 AM   Result Value Ref Range    Protime 18.9 (H) 9.6 - 11.5 Seconds    INR 1.71    Lavender Top    Collection Time: 03/24/17  9:41 AM   Result Value Ref Range    Extra Tube hold for add-on    CBC Auto Differential    Collection Time: 03/24/17  9:41 AM   Result Value Ref Range    WBC 4.66 3.50 - 10.80 10*3/mm3    RBC 3.11 (L) 3.89 - 5.14 10*6/mm3    Hemoglobin 10.2 (L) 11.5 - 15.5 g/dL    Hematocrit 31.4 (L) 34.5 - 44.0 %    .0 (H) 80.0 - 99.0 fL    MCH 32.8 (H) 27.0 - 31.0 pg    MCHC 32.5 32.0 - 36.0 g/dL    RDW 13.8 11.3 - 14.5 %    RDW-SD 50.8 37.0 - 54.0 fl    MPV 9.7 6.0 - 12.0 fL    Platelets 183 150 - 450 10*3/mm3    Neutrophil % 58.6 41.0 - 71.0 %    Lymphocyte % 24.5 24.0 - 44.0 %    Monocyte % 12.7 (H) 0.0 - 12.0 %    Eosinophil % 3.6 (H) 0.0 - 3.0 %    Basophil % 0.4 0.0 - 1.0 %    Immature Grans % 0.2 0.0 - 0.6 %    Neutrophils, Absolute 2.73 1.50 - 8.30 10*3/mm3    Lymphocytes, Absolute 1.14 0.60 - 4.80 10*3/mm3    Monocytes, Absolute 0.59 0.00 - 1.00 10*3/mm3    Eosinophils, Absolute 0.17 0.10 - 0.30 10*3/mm3    Basophils, Absolute 0.02 0.00 - 0.20 10*3/mm3    Immature Grans, Absolute 0.01 0.00 - 0.03 10*3/mm3   BNP    Collection Time: 03/24/17  9:41 AM   Result  Value Ref Range    .0 (H) 0.0 - 100.0 pg/mL   POC Troponin, Rapid    Collection Time: 03/24/17  9:52 AM   Result Value Ref Range    Troponin I 0.00 0.00 - 0.07 ng/mL   Troponin    Collection Time: 03/24/17 11:57 AM   Result Value Ref Range    Troponin I <0.006 <=0.039 ng/mL   Urinalysis With / Culture If Indicated    Collection Time: 03/24/17 12:05 PM   Result Value Ref Range    Color, UA Yellow Yellow, Straw    Appearance, UA Clear Clear    pH, UA <=5.0 5.0 - 8.0    Specific Gravity, UA 1.010 1.001 - 1.030    Glucose, UA Negative Negative    Ketones, UA Negative Negative    Bilirubin, UA Negative Negative    Blood, UA Negative Negative    Protein, UA Negative Negative    Leuk Esterase, UA Negative Negative    Nitrite, UA Negative Negative    Urobilinogen, UA 0.2 E.U./dL 0.2 - 1.0 E.U./dL     Note: In addition to lab results from this visit, the labs listed above may include labs taken at another facility or during a different encounter within the last 24 hours. Please correlate lab times with ED admission and discharge times for further clarification of the services performed during this visit.    XR Chest 1 View   Preliminary Result   Stable chest exam with no evidence of active disease.       D:  03/24/2017   E:  03/24/2017                Vitals:    03/24/17 1419 03/24/17 1422 03/24/17 1424 03/24/17 1430   BP: 135/64 138/86 132/73 129/70   Patient Position: Lying Sitting Standing    Pulse: 97 86 103 100   Resp:       Temp:       TempSrc:       SpO2:    98%   Weight:       Height:         Medications   sodium chloride 0.9 % bolus 500 mL (0 mL Intravenous Stopped 3/24/17 1114)   sodium chloride 0.9 % bolus 500 mL (0 mL Intravenous Stopped 3/24/17 1420)     ECG/EMG Results (last 24 hours)     Procedure Component Value Units Date/Time    ECG 12 Lead [[id number]] Collected:  03/24/17 0923     Updated:  03/24/17 0924                      MDM  Number of Diagnoses or Management Options  Volume depletion: new and  requires workup     Amount and/or Complexity of Data Reviewed  Clinical lab tests: ordered and reviewed  Review and summarize past medical records: yes  Independent visualization of images, tracings, or specimens: yes    Patient Progress  Patient progress: improved      Final diagnoses:   Volume depletion       Documentation assistance provided by libby Schrader.  Information recorded by the scribe was done at my direction and has been verified and validated by me.     Robel Schrader  03/24/17 1022       Robel Schrader  03/24/17 1437       Alexis Chang MD  03/25/17 0658

## 2017-03-24 NOTE — DISCHARGE INSTRUCTIONS
Don't take your fluid pill (Demadex) today.  Drink extra fluids over the next few days.  Return here if worsening weakness or any concerns.

## 2017-04-20 LAB — INR PPP: 1.5

## 2017-04-21 ENCOUNTER — TELEPHONE (OUTPATIENT)
Dept: PHARMACY | Facility: HOSPITAL | Age: 82
End: 2017-04-21

## 2017-04-21 NOTE — TELEPHONE ENCOUNTER
Spoke to Ms. Kennedy' daughter about INR result from PCP's office on 4/20. INR = 1.5, previously INR = 2.0 on 3/23/17.    Recommended to increase Ms. Jarrell warfarin to 1 mg every day except 2 mg on MWF for total weekly dose of 10 mg. This is approx 12% increase from prior regimen.    Asked to schedule an appointment in 2 weeks. Stephanie Montague, her daughter, was going to coordinate schedules and call for an appointment.

## 2017-04-24 ENCOUNTER — ANTICOAGULATION VISIT (OUTPATIENT)
Dept: PHARMACY | Facility: HOSPITAL | Age: 82
End: 2017-04-24

## 2017-04-24 DIAGNOSIS — I48.91 ATRIAL FIBRILLATION, UNSPECIFIED TYPE (HCC): ICD-10-CM

## 2017-04-27 ENCOUNTER — TELEPHONE (OUTPATIENT)
Dept: CARDIOLOGY | Facility: CLINIC | Age: 82
End: 2017-04-27

## 2017-04-27 NOTE — TELEPHONE ENCOUNTER
Dr. Loredo's office called requesting cardiac cleaance for nasal button under general anesthesia- [facility] states that she is cleared but needs to remain on ASA and Plavix- she may hold coumadin 2-3 days prior to procedure-  IF physician is not willing to do surgery on Plavix, she may hold 5-7 days but she prefers that she remain on Plavix-    Clearance f/c to [phone]

## 2017-05-01 ENCOUNTER — ANTICOAGULATION VISIT (OUTPATIENT)
Dept: PHARMACY | Facility: HOSPITAL | Age: 82
End: 2017-05-01

## 2017-05-01 LAB
INR PPP: 2.6 (ref 0.91–1.09)
PROTHROMBIN TIME: 31.2 SECONDS (ref 10–13.8)

## 2017-05-01 PROCEDURE — 36416 COLLJ CAPILLARY BLOOD SPEC: CPT

## 2017-05-01 PROCEDURE — 85610 PROTHROMBIN TIME: CPT

## 2017-05-01 PROCEDURE — G0463 HOSPITAL OUTPT CLINIC VISIT: HCPCS

## 2017-05-11 ENCOUNTER — ANTICOAGULATION VISIT (OUTPATIENT)
Dept: PHARMACY | Facility: HOSPITAL | Age: 82
End: 2017-05-11

## 2017-05-11 DIAGNOSIS — I48.91 ATRIAL FIBRILLATION, UNSPECIFIED TYPE (HCC): ICD-10-CM

## 2017-05-11 LAB
INR PPP: 1.6 (ref 0.91–1.09)
PROTHROMBIN TIME: 19.5 SECONDS (ref 10–13.8)

## 2017-05-11 PROCEDURE — G0463 HOSPITAL OUTPT CLINIC VISIT: HCPCS

## 2017-05-11 PROCEDURE — 36416 COLLJ CAPILLARY BLOOD SPEC: CPT

## 2017-05-11 PROCEDURE — 85610 PROTHROMBIN TIME: CPT

## 2017-05-12 ENCOUNTER — TRANSCRIBE ORDERS (OUTPATIENT)
Dept: PHARMACY | Facility: HOSPITAL | Age: 82
End: 2017-05-12

## 2017-05-12 DIAGNOSIS — I48.91 ATRIAL FIBRILLATION, UNSPECIFIED TYPE (HCC): Primary | ICD-10-CM

## 2017-05-25 ENCOUNTER — APPOINTMENT (OUTPATIENT)
Dept: PHARMACY | Facility: HOSPITAL | Age: 82
End: 2017-05-25

## 2017-06-02 ENCOUNTER — ANTICOAGULATION VISIT (OUTPATIENT)
Dept: PHARMACY | Facility: HOSPITAL | Age: 82
End: 2017-06-02

## 2017-06-02 LAB
INR PPP: 3.7 (ref 0.91–1.09)
PROTHROMBIN TIME: 44.7 SECONDS (ref 10–13.8)

## 2017-06-02 PROCEDURE — G0463 HOSPITAL OUTPT CLINIC VISIT: HCPCS

## 2017-06-02 PROCEDURE — 85610 PROTHROMBIN TIME: CPT

## 2017-06-02 PROCEDURE — 36416 COLLJ CAPILLARY BLOOD SPEC: CPT

## 2017-06-02 RX ORDER — WARFARIN SODIUM 1 MG/1
TABLET ORAL
Qty: 45 TABLET | Refills: 1 | Status: SHIPPED | OUTPATIENT
Start: 2017-06-02 | End: 2017-08-21 | Stop reason: SDUPTHER

## 2017-06-02 NOTE — PROGRESS NOTES
Anticoagulation Clinic Progress Note  Indication: afib  Referring Provider: Eddie  Goal INR: 2-3  Current Drug Interactions: levothyroxine    Anticoagulation Clinic INR History:  Date 9/1 9/29 10/6 10/31 11/15 11/22 12/13 1/12   Total Weekly Dose 9mg 9mg 8mg 14mg 10.5 mg 5mg  8mg  8mg   INR 2.4 3.8 2.2 3.7 (outside facility) 5.8, 5.7 2.8 2.1 1.7         3 held doses 1 missed dose      Date 1/26 2/16 3/9 3/23 4/21 5/1 5/11   Total Weekly Dose 9mg 9mg 9mg 9mg 9mg 10mg 10mg   INR 3.0 2.0 1.8 2.0 1.5 2.6 1.6   Notes  2 missed doses 1-2 missed doses 1 missed dose  Pre- procedure Missed dose     Date 6/2         Total Weekly Dose 9mg         INR 3.7         Notes              Clinic Interview:  Tablet Strength: pt has 1mg tablets  Current Dose: 1mg daily except 2mg MonFri   Patient Findings      Positives Missed doses, Extra doses, Change in diet/appetite     Negatives Signs/symptoms of thrombosis, Signs/symptoms of bleeding, Laboratory test error suspected, Change in health, Change in alcohol use, Change in activity, Upcoming invasive procedure, Emergency department visit, Upcoming dental procedure, Change in medications, Hospital admission, Bruising, Other complaints     Comments No nose bleeds post op. No s/sx of bleeding. Patient has moved to assisted living and will be getting three meals a day. Previously patient hasn't been eating 3x/day and not very many greens. Patient's daughter states that she will likely be eating more consistent green vegetables at this facility. Advised patient to avoid cooked spinach, kale, mustard greens, ramona greens. Patient's daughter stated she would tell facility to please replace with another vegetable with less vit K. Patient will be having medications given by [facility] and they will be putting them in a cup and giving them to her to take.  She had green beans the other day.       Plan:  1. INR is subtherapeutic again today. Patient has been taking 9mg/week  despite directions at last appointment. Patient's daughter doesn't recall any extra or missed doses this week. She will give the AVS to [facility] to use for filling medications. Will instruct pt to take warfarin 1mg tonight, then continue warfarin 1mg daily except 2mg MonFri (10% dose decrease this week only).  2. RTC in 2 weeks to ensure back WNL. Patient's diet will likely change at new facility.  3. No recent medication changes.  4. Verbal and written information provided. Pt and daughter express understanding and have no further questions at this time.      Gloria Jimenez, PharmD  6/2/2017  10:02 AM

## 2017-06-12 ENCOUNTER — TELEPHONE (OUTPATIENT)
Dept: PHARMACY | Facility: HOSPITAL | Age: 82
End: 2017-06-12

## 2017-06-12 NOTE — TELEPHONE ENCOUNTER
Wendy Vasuqezaktia Kennedy has been a mutual patient in the [facility] since September 2016. According to her daughter, Stephanie Montague, she will be moved to an assisted living facility and will continue to have her INR managed there. We will discharge her from our clinic as of today unless otherwise notified. Please let me know if I can be of further assistance to this patient, and her daughter can be contacted for further questions as well [phone].    We have enjoyed working to manage this patient's care with you.    Thank you,    Gloria Jimenez

## 2017-06-15 ENCOUNTER — ANTICOAGULATION VISIT (OUTPATIENT)
Dept: PHARMACY | Facility: HOSPITAL | Age: 82
End: 2017-06-15

## 2017-06-15 DIAGNOSIS — I48.91 ATRIAL FIBRILLATION, UNSPECIFIED TYPE (HCC): ICD-10-CM

## 2017-06-15 LAB
INR PPP: 2.6 (ref 0.91–1.09)
PROTHROMBIN TIME: 30.8 SECONDS (ref 10–13.8)

## 2017-06-15 PROCEDURE — 36416 COLLJ CAPILLARY BLOOD SPEC: CPT

## 2017-06-15 PROCEDURE — G0463 HOSPITAL OUTPT CLINIC VISIT: HCPCS

## 2017-06-15 PROCEDURE — 85610 PROTHROMBIN TIME: CPT

## 2017-06-15 NOTE — PROGRESS NOTES
Anticoagulation Clinic Progress Note  Indication: afib  Referring Provider: Eddie  Goal INR: 2-3  Current Drug Interactions: levothyroxine  [facility] is now managing her medicaitons, filling weekly medibox -- please call with any dose changes ([phone])    Anticoagulation Clinic INR History:  Date 1/26 2/16 3/9 3/23 4/21 5/1 5/11   Total Weekly Dose 9mg 9mg 9mg 9mg 9mg 10mg 10mg   INR 3.0 2.0 1.8 2.0 1.5 2.6 1.6   Notes  2 missed doses 1-2 missed doses 1 missed dose  Pre- procedure Missed dose     Date 6/2 6/15        Total Weekly Dose 9mg 9mg        INR 3.7 2.6        Notes              Clinic Interview:  Tablet Strength: pt has 1mg tablets  Current Dose: 1mg daily except 2mg MonFri   Patient Findings      Negatives Signs/symptoms of thrombosis, Signs/symptoms of bleeding, Laboratory test error suspected, Change in health, Change in alcohol use, Change in activity, Upcoming invasive procedure, Emergency department visit, Upcoming dental procedure, Missed doses, Extra doses, Change in medications, Change in diet/appetite, Hospital admission, Bruising, Other complaints     Comments Mrs Kennedy has moved to assisted living. [facility] fills a weekly medibox, and the staff at the NH give her meds to her. Her dtr therefore denies any missed warfarin doses + reports that she has not experienced any additional epistaxis or other complications.          Plan:  1. INR is therapeutic today. Will instruct pt to continue warfarin 1mg daily except 2mg MonFri -- phoned these instructions to [facility], as well.  2. Given recent lability, RTC in 2 weeks before appt with Dr Morgan. If therapeutic again at follow up, will spread appts back to [id number]. Pt will continue to be managed in the [facility] as assisted living does not manage warfarin.  3. No recent medication changes.  4. Verbal and written information provided. Pt and daughter express understanding and have no  further questions at this time.      Ann Cowden, PharmD  6/15/2017  10:29 AM

## 2017-06-15 NOTE — TELEPHONE ENCOUNTER
Patient seen in the [facility] today -- we will continue to manage her warfarin, as her assisted living facility does not provide this service.

## 2017-06-26 RX ORDER — TORSEMIDE 10 MG/1
TABLET ORAL
Qty: 90 TABLET | Refills: 0 | Status: SHIPPED | OUTPATIENT
Start: 2017-06-26 | End: 2017-09-25 | Stop reason: SDUPTHER

## 2017-06-29 ENCOUNTER — ANTICOAGULATION VISIT (OUTPATIENT)
Dept: PHARMACY | Facility: HOSPITAL | Age: 82
End: 2017-06-29

## 2017-06-29 ENCOUNTER — OFFICE VISIT (OUTPATIENT)
Dept: CARDIOLOGY | Facility: CLINIC | Age: 82
End: 2017-06-29

## 2017-06-29 VITALS
WEIGHT: 114.2 LBS | SYSTOLIC BLOOD PRESSURE: 104 MMHG | DIASTOLIC BLOOD PRESSURE: 58 MMHG | HEIGHT: 60 IN | HEART RATE: 99 BPM | BODY MASS INDEX: 22.42 KG/M2

## 2017-06-29 DIAGNOSIS — I25.10 CORONARY ARTERY DISEASE INVOLVING NATIVE CORONARY ARTERY OF NATIVE HEART WITHOUT ANGINA PECTORIS: ICD-10-CM

## 2017-06-29 DIAGNOSIS — I48.0 PAROXYSMAL ATRIAL FIBRILLATION (HCC): Primary | ICD-10-CM

## 2017-06-29 DIAGNOSIS — I48.91 ATRIAL FIBRILLATION, UNSPECIFIED TYPE (HCC): ICD-10-CM

## 2017-06-29 LAB
INR PPP: 2.9 (ref 0.91–1.09)
PROTHROMBIN TIME: 34.8 SECONDS (ref 10–13.8)

## 2017-06-29 PROCEDURE — 99213 OFFICE O/P EST LOW 20 MIN: CPT | Performed by: NURSE PRACTITIONER

## 2017-06-29 PROCEDURE — 85610 PROTHROMBIN TIME: CPT

## 2017-06-29 PROCEDURE — 36416 COLLJ CAPILLARY BLOOD SPEC: CPT

## 2017-06-29 PROCEDURE — G0463 HOSPITAL OUTPT CLINIC VISIT: HCPCS

## 2017-06-29 RX ORDER — PANTOPRAZOLE SODIUM 40 MG/1
40 TABLET, DELAYED RELEASE ORAL DAILY
COMMUNITY
End: 2020-11-09

## 2017-06-29 RX ORDER — ASPIRIN 81 MG/1
81 TABLET ORAL DAILY
COMMUNITY
End: 2020-11-09

## 2017-06-29 RX ORDER — DILTIAZEM HCL 90 MG
90 TABLET ORAL 2 TIMES DAILY
COMMUNITY
End: 2019-03-01

## 2017-06-29 RX ORDER — CLOPIDOGREL BISULFATE 75 MG/1
75 TABLET ORAL DAILY
COMMUNITY
End: 2020-11-09

## 2017-06-29 NOTE — PROGRESS NOTES
Anticoagulation Clinic Progress Note  Indication: afib  Referring Provider: Eddie  Goal INR: 1.8-2.5 as of 6/29/17  Current Drug Interactions: levothyroxine  [facility] is now managing her medicaitons, filling weekly medibox -- please call with any dose changes ([phone])    Anticoagulation Clinic INR History:  Date 1/26 2/16 3/9 3/23 4/21 5/1 5/11   Total Weekly Dose 9mg 9mg 9mg 9mg 9mg 10mg 10mg   INR 3.0 2.0 1.8 2.0 1.5 2.6 1.6   Notes  2 missed doses 1-2 missed doses 1 missed dose  Pre- procedure Missed dose     Date 6/2 6/15 6/29       Total Weekly Dose 9mg 9mg 9mg       INR 3.7 2.6 2.9       Notes              Clinic Interview:  Tablet Strength: pt has 1mg tablets  Current Dose: 1mg daily except 2mg MonFri   Patient Findings      Positives Signs/symptoms of bleeding     Negatives Signs/symptoms of thrombosis, Laboratory test error suspected, Change in health, Change in alcohol use, Change in activity, Upcoming invasive procedure, Emergency department visit, Upcoming dental procedure, Missed doses, Extra doses, Change in medications, Change in diet/appetite, Hospital admission, Bruising, Other complaints     Comments Pt has removed the button she had placed in her nose (r/t recurrent epistaxis) and has had some recent repeat epistaxis (pt does not self-report, but she had blood on her fingers, and blood on her pants when her dtr picked her up this AM).        Plan:  1. INR is therapeutic today at the upper end of pt's goal range. Will instruct pt to continue warfarin 1mg daily except 2mg MonFri for now. May consider lowering goal INR range in the near future, given age and risk of falls with h/o epistaxis.**  2. Repeat INR in 3 weeks at PCP appt (Dr Radha Santana). Will fax an order.  3. No recent medication changes.  4. Verbal and written information provided. Pt and daughter express understanding and have no further questions at this time.      Ann Cowden Mayer, PharmD  6/29/2017  9:32  AM      **ADDENDUM**  Dr Morgan and Flora have changed Mrs. Kennedy's INR goal + warfarin dosage. Will now target INR 1.8-2.5 and lower her warfarin dose to 1mg daily except 2mg on Monday. Have called [facility] to communicate this change -- will call Stephanie to ensure extra warfarin tab is removed from her mother's medibox tomorrow, 6/30.    Ann Cowden Mayer, PharmD  6/29/2017  11:29 AM

## 2017-06-29 NOTE — PROGRESS NOTES
Sarahbruce Kennedy  1/16/1929  [phone]      06/29/2017    Radha Santana MD    Chief Complaint   Patient presents with   • Atrial Fibrillation   • Coronary Artery Disease     PROBLEM LIST:  1. Atrial fibrillation:  a. CHADS score = 4.  b. Holter monitor, 06/09/2011: Average rate of 78 beats per minute with a minimal rate of 52, maximum rate of 108 and 6 pauses, the longest of which was 2.1 seconds.   c. Chronic Coumadin therapy.  d. Event monitor, July 2010: Heart rate measuring  beats per minute. Mild symptoms of lightheadedness not correlating with heart rate.  e. Echocardiogram 06/05/2009: Normal EF, mild MR, mildly aortic sclerosis, mild to moderate TR. RVSP of 44.  f. Echocardiogram, 06/12/2010: Mild MR, moderate TR, RVSP 42 mmHg. Normal EF.  g. Discontinuation of digoxin, March 2014.  h. Holter monitor, 04/04/2014, revealing atrial fibrillation with average rate of 66, occasional PVCs, multiple pauses over 2 seconds but all less than 3 seconds.  i. Hospitalization 10/2016 for afib with RVR. Troponin mildly elevated.   2. Coronary Artery Disease:  a. History of myocardial infarction, 1984.  b. Left heart catheterization, 2007, to unknown vessel.  c. Nuclear stress test, 03/22/2010: Very small sized defect and mild intensity in apical portion of anterior septal region. EF 68%.  d. Non-ST elevation MI, 02/22/2016 with cardiac catheterization by Lobito Steele MD with placement of a drug-eluting stent to the mid-LAD, drug-eluting to the mid-RCA, and a drug-eluting stent to the PDA. The stent in the LAD, 2.5 x 28 mm Xience drug-eluting stent; mid-right, 3.0 x 33 mm Xience drug-eluting stent. PDA, 2.5 x 28 mm Xience drug-eluting stent. EF 30% with anterolateral apical and inferior hypokinesis.   e. Cardiac Nuclear PET 10/11/2016: EF=42%. Positive PET scan for inducible ischemia versus solomon-infarct ischemia in the mid and distal anterior segment.   f. Cardiac catheterization 10/12/2016: 95% mid segment  IntraStent restenosis of the LAD. Patent stents of the RCA with nonocclusive disease of the mid RCA. Severe left regular systolic dysfunction, ejection fraction 25%. Successful PTCA of the LAD with 2.75 mm balloon reducing the 95% stenosis to no significant residual disease.   3. Presyncope.  4. History of transient ischemic attack x2 with last one 1 month ago.  5. Hypertension.  6. Hyperlipidemia.  7. Hypothyroidism.  8. Non-Hodgkin’s lymphoma.  9. Gastroesophageal reflux disease.  10. Glaucoma.  11. Chronic back and left leg pain, pain management per Dr. Lynch.  12. Surgical history:  a. Esophageal dilation.  b. Tonsillectomy.    Allergies   Allergen Reactions   • Atorvastatin        Current Medications:      Current Outpatient Prescriptions:   •  aspirin 81 MG EC tablet, Take 81 mg by mouth Daily., Disp: , Rfl:   •  carvedilol (COREG) 6.25 MG tablet, Take 1 tablet by mouth Every 12 (Twelve) Hours., Disp: 180 tablet, Rfl: 1  •  clopidogrel (PLAVIX) 75 MG tablet, Take 75 mg by mouth Daily., Disp: , Rfl:   •  diltiaZEM (CARDIZEM) 90 MG tablet, Take 90 mg by mouth 2 (Two) Times a Day., Disp: , Rfl:   •  gabapentin (NEURONTIN) 100 MG capsule, Take 100 mg by mouth daily., Disp: , Rfl:   •  levothyroxine (SYNTHROID, LEVOTHROID) 75 MCG tablet, Take 75 mcg by mouth daily., Disp: , Rfl:   •  lisinopril (PRINIVIL,ZESTRIL) 2.5 MG tablet, Take 2.5 mg by mouth daily., Disp: , Rfl:   •  pantoprazole (PROTONIX) 40 MG EC tablet, Take 40 mg by mouth Daily., Disp: , Rfl:   •  PHARMACY TO DOSE WARFARIN, Continuous As Needed (patient is managed by the [facility] ([phone]))., Disp: , Rfl:   •  rosuvastatin (CRESTOR) 20 MG tablet, Take 1 tablet by mouth Every Night., Disp: 90 tablet, Rfl: 1  •  thiamine (VITAMINE B-1) 100 MG tablet, Take 100 mg by mouth daily., Disp: , Rfl:   •  torsemide (DEMADEX) 10 MG tablet, TAKE ONE TABLET DAILY, Disp: 90 tablet, Rfl: 0  •  warfarin (COUMADIN) 1 MG  "tablet, Take one every evening or as directed, Disp: 45 tablet, Rfl: 1    HPI    Ms. Kennedy presents today for 6 month follow up of coronary artery disease and atrial fibrillation. Since last visit, patient has been Doing well.  She has been living in an assisted living facility for the past month and seems to be enjoying it.  Her INR levels have been running a little on the high side.  It is possible that she is eating better and having more greens now that her meals are prepared for her.  In the past year she had a septal button placed by Dr. Bach in which she has somehow removed.  She has had some intermittent bleeding from this.  For this reason I would like to lower her INR range to 1.8-2.5 and we'll plan to keep her on the low end of normal.  Encouraged her to try become more active with the activities that occur in her assisted-living facility.  She does not seem interested in attending any of these.  Patient denies chest pain, palpitations, shortness of breath, edema, PND, orthopnea, dizziness, and syncope.     The following portions of the patient's history were reviewed and updated as appropriate: allergies, current medications and problem list.    Pertinent positives as listed in the HPI.  All other systems reviewed are negative.    Vitals:    06/29/17 1000   BP: 104/58   BP Location: Right arm   Patient Position: Sitting   Pulse: 99   Weight: 114 lb 3.2 oz (51.8 kg)   Height: 60\" (152.4 cm)       Physical Exam:  General: Alert and oriented  Neck: Jugular venous pressure is within normal limits. Carotids have normal upstrokes without bruits.   Cardiovascular: Regular rate and rhythm without murmur gallop or rub.  Lungs: Clear without rales or wheezes. Equal expansion is noted.   Extremities: Show no edema.  Skin: warm and dry.  Neurologic: nonfocal    Diagnostic Data:    Lab Results   Component Value Date    INR 2.6 (H) 06/15/2017    INR 3.7 (H) 06/02/2017    INR 1.6 (H) 05/11/2017    PROTIME 30.8 " (H) 06/15/2017    PROTIME 44.7 (H) 06/02/2017    PROTIME 19.5 (H) 05/11/2017     Lab Results   Component Value Date    WBC 4.66 03/24/2017    HGB 10.2 (L) 03/24/2017    HCT 31.4 (L) 03/24/2017    .0 (H) 03/24/2017     03/24/2017     Lab Results   Component Value Date    GLUCOSE 98 03/24/2017    BUN 25 (H) 03/24/2017    CREATININE 1.10 03/24/2017    EGFRIFNONA 47 (L) 03/24/2017    BCR 22.7 03/24/2017    K 4.1 03/24/2017    CO2 27.0 03/24/2017    CALCIUM 9.1 03/24/2017    ALBUMIN 3.80 03/24/2017    LABIL2 1.6 03/24/2017    AST 25 03/24/2017    ALT 10 03/24/2017     Lab Results   Component Value Date    TSH 2.173 10/11/2016         Procedures    Assessment:      ICD-10-CM ICD-9-CM   1. Paroxysmal atrial fibrillation- stable I48.0 427.31   2. Coronary artery disease involving native coronary artery of native heart without angina pectoris- stable I25.10 414.01       Plan:    1. Change INR range to 1.8-2.5; Mary Fuentes notified.    2. Change Coumadin dose to 2 mg on Mondays, 1 mg the rest of the week.  3. Continue current medications.  4. F/up in 6 months or sooner if needed.      Seen independently by LESLY Barbour on June 29, 2017 at 1040

## 2017-07-05 ENCOUNTER — HOSPITAL ENCOUNTER (EMERGENCY)
Facility: HOSPITAL | Age: 82
Discharge: HOME | End: 2017-07-05
Attending: EMERGENCY MEDICINE | Admitting: EMERGENCY MEDICINE

## 2017-07-05 VITALS
BODY MASS INDEX: 20.42 KG/M2 | RESPIRATION RATE: 18 BRPM | HEART RATE: 132 BPM | HEIGHT: 60 IN | OXYGEN SATURATION: 99 % | TEMPERATURE: 98.5 F | SYSTOLIC BLOOD PRESSURE: 109 MMHG | WEIGHT: 104 LBS | DIASTOLIC BLOOD PRESSURE: 59 MMHG

## 2017-07-05 DIAGNOSIS — Z79.01 CHRONIC ANTICOAGULATION: ICD-10-CM

## 2017-07-05 DIAGNOSIS — J34.89 NASAL SEPTAL PERFORATION: ICD-10-CM

## 2017-07-05 DIAGNOSIS — R04.0 EPISTAXIS: Primary | ICD-10-CM

## 2017-07-05 LAB
ALBUMIN SERPL-MCNC: 4.3 G/DL (ref 3.2–4.8)
ALBUMIN/GLOB SERPL: 1.7 G/DL (ref 1.5–2.5)
ALP SERPL-CCNC: 82 U/L (ref 25–100)
ALT SERPL W P-5'-P-CCNC: 13 U/L (ref 7–40)
ANION GAP SERPL CALCULATED.3IONS-SCNC: 12 MMOL/L (ref 3–11)
AST SERPL-CCNC: 29 U/L (ref 0–33)
BASOPHILS # BLD AUTO: 0.03 10*3/MM3 (ref 0–0.2)
BASOPHILS NFR BLD AUTO: 0.5 % (ref 0–1)
BILIRUB SERPL-MCNC: 0.4 MG/DL (ref 0.3–1.2)
BUN BLD-MCNC: 29 MG/DL (ref 9–23)
BUN/CREAT SERPL: 24.2 (ref 7–25)
CALCIUM SPEC-SCNC: 9.7 MG/DL (ref 8.7–10.4)
CHLORIDE SERPL-SCNC: 101 MMOL/L (ref 99–109)
CO2 SERPL-SCNC: 25 MMOL/L (ref 20–31)
CREAT BLD-MCNC: 1.2 MG/DL (ref 0.6–1.3)
DEPRECATED RDW RBC AUTO: 48.5 FL (ref 37–54)
EOSINOPHIL # BLD AUTO: 0.12 10*3/MM3 (ref 0–0.3)
EOSINOPHIL NFR BLD AUTO: 1.9 % (ref 0–3)
ERYTHROCYTE [DISTWIDTH] IN BLOOD BY AUTOMATED COUNT: 15.8 % (ref 11.3–14.5)
GFR SERPL CREATININE-BSD FRML MDRD: 42 ML/MIN/1.73
GLOBULIN UR ELPH-MCNC: 2.5 GM/DL
GLUCOSE BLD-MCNC: 113 MG/DL (ref 70–100)
HCT VFR BLD AUTO: 29.7 % (ref 34.5–44)
HGB BLD-MCNC: 9.4 G/DL (ref 11.5–15.5)
HOLD SPECIMEN: NORMAL
HOLD SPECIMEN: NORMAL
IMM GRANULOCYTES # BLD: 0.01 10*3/MM3 (ref 0–0.03)
IMM GRANULOCYTES NFR BLD: 0.2 % (ref 0–0.6)
INR PPP: 2.27
LYMPHOCYTES # BLD AUTO: 1.66 10*3/MM3 (ref 0.6–4.8)
LYMPHOCYTES NFR BLD AUTO: 26 % (ref 24–44)
MCH RBC QN AUTO: 26.9 PG (ref 27–31)
MCHC RBC AUTO-ENTMCNC: 31.6 G/DL (ref 32–36)
MCV RBC AUTO: 85.1 FL (ref 80–99)
MONOCYTES # BLD AUTO: 0.5 10*3/MM3 (ref 0–1)
MONOCYTES NFR BLD AUTO: 7.8 % (ref 0–12)
NEUTROPHILS # BLD AUTO: 4.06 10*3/MM3 (ref 1.5–8.3)
NEUTROPHILS NFR BLD AUTO: 63.6 % (ref 41–71)
PLATELET # BLD AUTO: 267 10*3/MM3 (ref 150–450)
PMV BLD AUTO: 11.3 FL (ref 6–12)
POTASSIUM BLD-SCNC: 4.5 MMOL/L (ref 3.5–5.5)
PROT SERPL-MCNC: 6.8 G/DL (ref 5.7–8.2)
PROTHROMBIN TIME: 25.4 SECONDS (ref 9.6–11.5)
RBC # BLD AUTO: 3.49 10*6/MM3 (ref 3.89–5.14)
SODIUM BLD-SCNC: 138 MMOL/L (ref 132–146)
WBC NRBC COR # BLD: 6.38 10*3/MM3 (ref 3.5–10.8)
WHOLE BLOOD HOLD SPECIMEN: NORMAL
WHOLE BLOOD HOLD SPECIMEN: NORMAL

## 2017-07-05 PROCEDURE — 85610 PROTHROMBIN TIME: CPT | Performed by: EMERGENCY MEDICINE

## 2017-07-05 PROCEDURE — 99284 EMERGENCY DEPT VISIT MOD MDM: CPT

## 2017-07-05 PROCEDURE — 80053 COMPREHEN METABOLIC PANEL: CPT | Performed by: EMERGENCY MEDICINE

## 2017-07-05 PROCEDURE — 85025 COMPLETE CBC W/AUTO DIFF WBC: CPT | Performed by: EMERGENCY MEDICINE

## 2017-07-05 RX ORDER — SODIUM CHLORIDE 0.9 % (FLUSH) 0.9 %
10 SYRINGE (ML) INJECTION AS NEEDED
Status: DISCONTINUED | OUTPATIENT
Start: 2017-07-05 | End: 2017-07-05 | Stop reason: HOSPADM

## 2017-07-05 RX ORDER — BACITRACIN, NEOMYCIN, POLYMYXIN B 400; 3.5; 5 [USP'U]/G; MG/G; [USP'U]/G
OINTMENT TOPICAL ONCE
Status: DISCONTINUED | OUTPATIENT
Start: 2017-07-05 | End: 2017-07-05 | Stop reason: HOSPADM

## 2017-07-05 RX ORDER — BACITRACIN, NEOMYCIN, POLYMYXIN B 400; 3.5; 5 [USP'U]/G; MG/G; [USP'U]/G
1 OINTMENT TOPICAL ONCE
Status: DISCONTINUED | OUTPATIENT
Start: 2017-07-05 | End: 2017-07-05

## 2017-07-05 RX ADMIN — COCAINE HYDROCHLORIDE: 40 SOLUTION TOPICAL at 17:01

## 2017-07-05 NOTE — DISCHARGE INSTRUCTIONS
Hold your Coumadin for 2 days, then continue as usual    Apply Neosporin to your nose twice a day for the next 2 days    Make sure you do not vigorously blow your nose, and no trauma to her nose including picking rubbing or any other manipulation that will cause rebleeding    Immediate return to the ED if you have any acute urgent or emergent symptoms as discussed    Follow-up Dr. Cornell either later this week in the office early next week.  Follow-up at once if you have recurrent bleeding

## 2017-07-05 NOTE — ED PROVIDER NOTES
Subjective   HPI Comments: Sarah Kennedy is a 88 y.o.female who presents to the ED with c/o epistaxis. The pt had a hole in her septum, and she had surgery to put a plug into the hole. Six days ago, she pulled out the plug on accident, and she developed slight nose bleed, although it quickly receded. Today, she developed a nose bleed that will not slow. At the ED, she denies N/V, dizziness, other trauma or injury, light-headedness or any other acute sx.    Ms. Kennedy is on Coumadin for multiple MIs.    Patient is a 88 y.o. female presenting with nosebleeds.   History provided by:  Patient  Nose Bleed   Location:  Bilateral  Timing:  Constant  Progression:  Unchanged  Context: anticoagulants    Relieved by:  None tried  Worsened by:  Nothing  Ineffective treatments:  None tried  Associated symptoms: no congestion, no cough, no dizziness, no fever, no headaches and no sore throat        Review of Systems   Constitutional: Negative for chills, fatigue and fever.   HENT: Positive for nosebleeds. Negative for congestion and sore throat.    Respiratory: Negative for cough.    Gastrointestinal: Negative for blood in stool, nausea and vomiting.   Skin: Positive for wound.   Neurological: Negative for dizziness and headaches.   All other systems reviewed and are negative.      Past Medical History:   Diagnosis Date   • A-fib    • Cancer    • Chronic back pain 10/17/2016    Chronic back and left leg pain, pain management per Dr. Lynch.   • Coronary artery disease 10/17/2016    History of myocardial infarction, 1984. Left heart catheterization, 2007, to unknown vessel. Nuclear stress test, 03/22/2010:  Very small sized defect and mild intensity in apical portion of anterior septal region.  EF 68%. Non-ST elevation MI, 02/22/2016 with cardiac catheterization by Lobito Steele MD with placement of a drug-eluting stent to the mid-LAD, drug-eluting to the mid-RCA, and a drug-eluting stent to the PDA.  The stent in the LAD,  2.5 x 28 mm Xience drug-eluting stent; mid-right, 3.0 x 33 mm Xience drug-eluting stent.  PDA, 2.5 x 28 mm Xience drug-eluting stent.  EF 30% with anterolateral apical and inferior hypokinesis.     • Dementia    • Disease of thyroid gland    • Heart attack    • Hyperlipidemia    • Hypertension    • Lymphoma    • Lymphoma     Non-Hodgkin’s lymphoma   • Stroke    • Syncope 10/17/2016    Presyncope        Allergies   Allergen Reactions   • Atorvastatin        Past Surgical History:   Procedure Laterality Date   • CARDIAC CATHETERIZATION N/A 10/12/2016    Procedure: Left Heart Cath;  Surgeon: Nikia Chambers MD;  Location: [facility] CATH INVASIVE LOC[facility];  Service:    • ESOPHAGEAL DILATATION     • NASAL SEPTAL RECONSTRUCTION     • TONSILLECTOMY         Family History   Problem Relation Age of Onset   • Heart disease Father    • Heart attack Father    • Heart disease Maternal Grandmother    • Heart disease Maternal Grandfather    • No Known Problems Mother        Social History     Social History   • Marital status:      Spouse name: N/A   • Number of children: N/A   • Years of education: N/A     Social History Main Topics   • Smoking status: Never Smoker   • Smokeless tobacco: Never Used   • Alcohol use 0.6 - 1.2 oz/week     1 - 2 Glasses of wine per week      Comment: Pt's daughter will no longer let her have any alcohol   • Drug use: No   • Sexual activity: No     Other Topics Concern   • None     Social History Narrative    Lives in an assisted living facility         Objective   Physical Exam   Constitutional: She is oriented to person, place, and time. She appears well-developed and well-nourished. No distress.   HENT:   Head: Normocephalic and atraumatic.   Mouth/Throat: No oropharyngeal exudate.   Right naris, septal defect visualized. Some bleeding from the left naris as well.   Eyes: Conjunctivae are normal. No scleral icterus.   Conjunctivae benign.   Neck: Normal range of motion. Neck supple. No JVD  present.   Cardiovascular: Normal rate, regular rhythm and normal heart sounds.    Pulmonary/Chest: Effort normal and breath sounds normal. No respiratory distress.   Abdominal: Soft. She exhibits no distension.   Musculoskeletal: Normal range of motion. She exhibits no edema.   Neurological: She is alert and oriented to person, place, and time.   Skin: Skin is warm and dry. She is not diaphoretic.   Psychiatric: She has a normal mood and affect. Her behavior is normal.   Nursing note and vitals reviewed.      Epistaxis Management  Date/Time: 7/5/2017 4:44 PM  Performed by: TANA PINEDA  Authorized by: TANA PINEDA   Consent: Verbal consent obtained. Written consent not obtained.  Risks and benefits: risks, benefits and alternatives were discussed  Consent given by: patient  Patient understanding: patient states understanding of the procedure being performed  Patient consent: the patient's understanding of the procedure matches consent given  Procedure consent: procedure consent matches procedure scheduled  Patient identity confirmed: verbally with patient and arm band  Sedation:  Patient sedated: no    Treatment site: Nasal septum.  Repair method: suction, cophenylcaine and silver nitrate  Post-procedure assessment: bleeding stopped  Patient tolerance: Patient tolerated the procedure well with no immediate complications  Comments: Nasal septum defect with bleeding on both sides of the septum. Extensive suctioning from both naris. Large clot removed posteriorly as well. Minimal remnant oozing now.                ED Course  ED Course   Comment By Time   After the initial treatment of the nosebleed I discussed case with Dr. Cleaning on call for Dr. Cornell.  He feels that if she continues to ooze I can perform some chemical cautery around the margin of the septal defect where the button was.  The patient pulled but now 2 days ago and I discussed this with Dr. Cleaning.  If I'm unsuccessful he will see and  treat the patient in the ED. Deep Zimmerman MD 07/05 1816   The patient evaluated with 2 small areas of the very minimal oozing after removal of the clot as previously.  I treated these both with topical cocaine.  I treated him both the left inferior nare aspect of the septal deviation with topical silver nitrate, as well is one punctate area to the posterior midline area of the septal defect.  Patient is reexamined after another 15 minutes with no further oozing or bleeding.I discussed discharge with patient and daughter.  I discussed that if she picks at this lesion and will again start bleeding.  I've asked her to hold her Coumadin for the next 2 days and then restart it as this is treatment for atrial fibrillation and her risk of bleeding is greater than stroke for the immediate next few Macie discussed follow-up with Dr. CANDACE Jones and indications for immediate returnIf she has repeat bleeding she'll probably need bilateral Merocel packs with Surgicel and other hemostatic agent. Deep Zimmerman MD 07/05 1819     Recent Results (from the past 24 hour(s))   Protime-INR    Collection Time: 07/05/17  1:09 PM   Result Value Ref Range    Protime 25.4 (H) 9.6 - 11.5 Seconds    INR 2.27    Light Blue Top    Collection Time: 07/05/17  1:09 PM   Result Value Ref Range    Extra Tube hold for add-on    Green Top (Gel)    Collection Time: 07/05/17  1:09 PM   Result Value Ref Range    Extra Tube Hold for add-ons.    Lavender Top    Collection Time: 07/05/17  1:09 PM   Result Value Ref Range    Extra Tube hold for add-on    Gold Top - SST    Collection Time: 07/05/17  1:09 PM   Result Value Ref Range    Extra Tube Hold for add-ons.    Comprehensive Metabolic Panel    Collection Time: 07/05/17  1:09 PM   Result Value Ref Range    Glucose 113 (H) 70 - 100 mg/dL    BUN 29 (H) 9 - 23 mg/dL    Creatinine 1.20 0.60 - 1.30 mg/dL    Sodium 138 132 - 146 mmol/L    Potassium 4.5 3.5 - 5.5 mmol/L    Chloride 101 99 - 109 mmol/L    CO2  25.0 20.0 - 31.0 mmol/L    Calcium 9.7 8.7 - 10.4 mg/dL    Total Protein 6.8 5.7 - 8.2 g/dL    Albumin 4.30 3.20 - 4.80 g/dL    ALT (SGPT) 13 7 - 40 U/L    AST (SGOT) 29 0 - 33 U/L    Alkaline Phosphatase 82 25 - 100 U/L    Total Bilirubin 0.4 0.3 - 1.2 mg/dL    eGFR Non African Amer 42 (L) >60 mL/min/1.73    Globulin 2.5 gm/dL    A/G Ratio 1.7 1.5 - 2.5 g/dL    BUN/Creatinine Ratio 24.2 7.0 - 25.0    Anion Gap 12.0 (H) 3.0 - 11.0 mmol/L   CBC Auto Differential    Collection Time: 07/05/17  1:09 PM   Result Value Ref Range    WBC 6.38 3.50 - 10.80 10*3/mm3    RBC 3.49 (L) 3.89 - 5.14 10*6/mm3    Hemoglobin 9.4 (L) 11.5 - 15.5 g/dL    Hematocrit 29.7 (L) 34.5 - 44.0 %    MCV 85.1 80.0 - 99.0 fL    MCH 26.9 (L) 27.0 - 31.0 pg    MCHC 31.6 (L) 32.0 - 36.0 g/dL    RDW 15.8 (H) 11.3 - 14.5 %    RDW-SD 48.5 37.0 - 54.0 fl    MPV 11.3 6.0 - 12.0 fL    Platelets 267 150 - 450 10*3/mm3    Neutrophil % 63.6 41.0 - 71.0 %    Lymphocyte % 26.0 24.0 - 44.0 %    Monocyte % 7.8 0.0 - 12.0 %    Eosinophil % 1.9 0.0 - 3.0 %    Basophil % 0.5 0.0 - 1.0 %    Immature Grans % 0.2 0.0 - 0.6 %    Neutrophils, Absolute 4.06 1.50 - 8.30 10*3/mm3    Lymphocytes, Absolute 1.66 0.60 - 4.80 10*3/mm3    Monocytes, Absolute 0.50 0.00 - 1.00 10*3/mm3    Eosinophils, Absolute 0.12 0.00 - 0.30 10*3/mm3    Basophils, Absolute 0.03 0.00 - 0.20 10*3/mm3    Immature Grans, Absolute 0.01 0.00 - 0.03 10*3/mm3     Note: In addition to lab results from this visit, the labs listed above may include labs taken at another facility or during a different encounter within the last 24 hours. Please correlate lab times with ED admission and discharge times for further clarification of the services performed during this visit.    No orders to display     Vitals:    07/05/17 1730 07/05/17 1750 07/05/17 1826 07/05/17 1831   BP: 109/59      BP Location:       Patient Position:       Pulse: 117 114 (!) 132    Resp:    18   Temp:    98.5 °F (36.9 °C)   TempSrc:    Oral    SpO2: 99% 99% 99%    Weight:       Height:         Medications   sodium chloride 0.9 % flush 10 mL (not administered)   neomycin-bacitracin-polymyxin (NEOSPORIN) ointment 1 application (not administered)   cocaine 4 % external solution ( Topical Given by Other 7/5/17 1703)     ECG/EMG Results (last 24 hours)     ** No results found for the last 24 hours. **                        [facility]  EMR Dragon/Transcription disclaimer:   Much of this encounter note is an electronic transcription/translation of spoken language to printed text. The electronic translation of spoken language may permit erroneous, or at times, nonsensical words or phrases to be inadvertently transcribed; Although I have reviewed the note for such errors, some may still exist.     Final diagnoses:   Epistaxis   Chronic anticoagulation   Nasal septal perforation       Documentation assistance provided by libby Nguyen.  Information recorded by the libby was done at my direction and has been verified and validated by me.     Isaías Nguyen  07/05/17 1506       Isaías Nguyen  07/05/17 1700       Deep Zimmerman MD  07/05/17 [id number]

## 2017-07-20 ENCOUNTER — ANTICOAGULATION VISIT (OUTPATIENT)
Dept: PHARMACY | Facility: HOSPITAL | Age: 82
End: 2017-07-20

## 2017-07-20 DIAGNOSIS — I48.0 PAROXYSMAL ATRIAL FIBRILLATION (HCC): ICD-10-CM

## 2017-07-20 LAB — INR PPP: 1.6

## 2017-07-21 NOTE — PROGRESS NOTES
Anticoagulation Clinic Progress Note  Indication: afib  Referring Provider: Eddie  Goal INR: 1.8-2.5 as of 6/29/17  Current Drug Interactions: levothyroxine  Jairy is now managing her medications, filling weekly medibox -- please call with any dose changes ([phone])      Anticoagulation Clinic INR History:  Date 1/26 2/16 3/9 3/23 4/21 5/1 5/11   Total Weekly Dose 9mg 9mg 9mg 9mg 9mg 10mg 10mg   INR 3.0 2.0 1.8 2.0 1.5 2.6 1.6   Notes  2 missed doses 1-2 missed doses 1 missed dose  Pre- procedure Missed dose     Date 6/2 6/15 6/29 7/20      Total Weekly Dose 9mg 9mg 9mg 8mg 8.5 mg     INR 3.7 2.6 2.9 1.6 (PCP)      Notes    skipped 2 doses          Clinic Interview:  Tablet Strength: pt has 1mg tablets  Current Dose: 1mg daily except 2mg Mondays  Patient Findings      Positives Signs/symptoms of bleeding, Emergency department visit, Missed doses     Negatives Signs/symptoms of thrombosis, Laboratory test error suspected, Change in health, Change in alcohol use, Change in activity, Upcoming invasive procedure, Upcoming dental procedure, Extra doses, Change in medications, Change in diet/appetite, Hospital admission, Bruising, Other complaints     Comments Pt's warfarin dose was adjusted per cardiology two weeks ago. She was seen in the ED the following week with epistaxis (INR 2.27) 2/2 recently removed septal button (discussed at last Anticoag Clinic visit). She was treated with topical silver nitrate and discharged home with instructions to hold warfarin x2 days. Per pt's dtr, the bleeding has resolved since then. No med changes made today at Dr. Santana's office. Her dtr continues to report that her mother is eating better (and eating more GLV) at the NH than she was before this move.         Plan:  1. INR is subtherapeutic today. This could possibly be r/t held warfarin doses after ED visit two weeks ago, although would have expected INR rebound by now. Unfortunately, [facility]  fills pt's medibox each week (sent to pt on Tuesdays). Her dtr is unable to get to the NH today, and the nursing staff is not permitted to make any changes to pt's mediboxes. Pt will therefore continue on warfarin 1mg daily except 2mg MonFri through the weekend. Will then instruct pt's pharmacy to adjust her dosing to warfarin 1mg daily except 1.5mg MWF (6% dose incr.).  2. RTC in 2 weeks to assess dose change before pt's dtr heads out of town ([facility] x2 weeks).  3. No recent medication changes.  4. Verbal information provided over the phone (dtr Stephanie Honeycutt and Hong Germain RPh). Pt and daughter express understanding and have no further questions at this time.      Ann Cowden Mayer, PharmD  7/21/2017  3:14 PM

## 2017-08-03 ENCOUNTER — ANTICOAGULATION VISIT (OUTPATIENT)
Dept: PHARMACY | Facility: HOSPITAL | Age: 82
End: 2017-08-03

## 2017-08-03 LAB
INR PPP: 1.4 (ref 0.91–1.09)
PROTHROMBIN TIME: 17.2 SECONDS (ref 10–13.8)

## 2017-08-03 PROCEDURE — G0463 HOSPITAL OUTPT CLINIC VISIT: HCPCS

## 2017-08-03 PROCEDURE — 36416 COLLJ CAPILLARY BLOOD SPEC: CPT

## 2017-08-03 PROCEDURE — 85610 PROTHROMBIN TIME: CPT

## 2017-08-03 NOTE — PROGRESS NOTES
Anticoagulation Clinic Progress Note  Indication: afib  Referring Provider: Eddie  Goal INR: 1.8-2.5 as of 6/29/17  Current Drug Interactions: levothyroxine  [facility] is now managing her medications, filling weekly medibox -- please call with any dose changes ([phone])      Anticoagulation Clinic INR History:  Date 1/26 2/16 3/9 3/23 4/21 5/1 5/11   Total Weekly Dose 9mg 9mg 9mg 9mg 9mg 10mg 10mg   INR 3.0 2.0 1.8 2.0 1.5 2.6 1.6   Notes  2 missed doses 1-2 missed doses 1 missed dose  Pre- procedure Missed dose     Date 6/2 6/15 6/29 7/20 8/3     Total Weekly Dose 9mg 9mg 9mg 8mg 8.5 mg     INR 3.7 2.6 2.9 1.6 (PCP) 1.4     Notes    skipped 2 doses          Clinic Interview:  Tablet Strength: pt has 1mg tablets  Current Dose: 1mg daily except 2mg Mondays  Patient Findings      Negatives Signs/symptoms of thrombosis, Signs/symptoms of bleeding, Laboratory test error suspected, Change in health, Change in alcohol use, Change in activity, Upcoming invasive procedure, Emergency department visit, Upcoming dental procedure, Missed doses, Extra doses, Change in medications, Change in diet/appetite, Hospital admission, Bruising, Other complaints     Comments Patient cannot remember therefore is unable to discuss recent GLV that were prepared for her at assisted living. Her daughter anticipates that she eats more GLV since she was moved to assisted living in June 2017. Patient's INR has fluctuated since. [facility] typically fills pillboxes on Tuesday's and plan to send one month's supply next Tuesday 8/8 as her daughter will be out of town for one month. Patient is unlikely to skip a dose of medications while at assisted living facility.   Addendum: Unable to reach daughter. At this time, will instruct patient to continue current dose given narrow therapeutic window and recent increase in dose. Confirmed dosing with Jinny.     Plan:  1. INR is subtherapeutic again today at 1.4. Pillboxes are  filled at [facility] each week and patient receives her new box on Tuesdays. Her dtr is unable to get to the NH today, and the nursing staff is not permitted to make any changes to pt's mediboxes. Pt will therefore continue on warfarin 1mg daily except 2mg MonFri through the weekend. Hesitant to increase the patient's dose at this time given recent dose increase, therapeutic goal of 1.7-2.5 with past INR values above 2.5 on 9mg/week (patient currently on 8.5mg), and at this time the patient cannot RTC until 8/31. Patient's daughter is going out of town and is going to attempt to find another ride for the patient, if not, the patient will RTC in one month. At this time, will instruct patient to continue 1mg daily except 1.5mg MWF unless patient is able to RTC sooner. Patient's daughter will call the clinic on 8/4 or 8/7 to discuss if other option was made available. At that time, will consider further adjustments to dose. Will relay dosing to [facility].  2. RTC in 4 weeks. Please refer to above. Discussed option of a home monitor, however, patient's daughter thinks it's good that she gets out.  3. No recent medication changes.  4. Verbal information provided over the phone (dtr Stephanie + Hong Weaver RPh). Pt and daughter express understanding and have no further questions at this time.    Gloria Jimenez, PharmD  8/3/2017  2:49 PM

## 2017-08-11 ENCOUNTER — TELEPHONE (OUTPATIENT)
Dept: PHARMACY | Facility: HOSPITAL | Age: 82
End: 2017-08-11

## 2017-08-11 DIAGNOSIS — I48.91 ATRIAL FIBRILLATION, UNSPECIFIED TYPE (HCC): Primary | ICD-10-CM

## 2017-08-11 NOTE — TELEPHONE ENCOUNTER
Pt's dtr Stephanie called from [facility]. Her brother is unable to bring their mother in for an INR next week, so the earliest that her mother will have access to a lab facility will be Thursday 8/24 when she has an appt at Dr. Santana's office. Will fax an order to [facility].

## 2017-08-18 ENCOUNTER — APPOINTMENT (OUTPATIENT)
Dept: PHARMACY | Facility: HOSPITAL | Age: 82
End: 2017-08-18

## 2017-08-22 ENCOUNTER — ANTICOAGULATION VISIT (OUTPATIENT)
Dept: PHARMACY | Facility: HOSPITAL | Age: 82
End: 2017-08-22

## 2017-08-22 DIAGNOSIS — I48.91 ATRIAL FIBRILLATION, UNSPECIFIED TYPE (HCC): ICD-10-CM

## 2017-08-22 LAB
INR PPP: 1.3 (ref 0.91–1.09)
PROTHROMBIN TIME: 15.1 SECONDS (ref 10–13.8)

## 2017-08-22 PROCEDURE — 85610 PROTHROMBIN TIME: CPT

## 2017-08-22 PROCEDURE — G0463 HOSPITAL OUTPT CLINIC VISIT: HCPCS

## 2017-08-22 PROCEDURE — 36416 COLLJ CAPILLARY BLOOD SPEC: CPT

## 2017-08-22 RX ORDER — WARFARIN SODIUM 1 MG/1
TABLET ORAL
Qty: 45 TABLET | Refills: 11 | Status: SHIPPED | OUTPATIENT
Start: 2017-08-22 | End: 2017-09-07 | Stop reason: ALTCHOICE

## 2017-08-22 NOTE — PROGRESS NOTES
Anticoagulation Clinic Progress Note  Indication: afib  Referring Provider: Eddie  Goal INR: 1.8-2.5 as of 6/29/17  Current Drug Interactions: levothyroxine  [facility] is now managing her medications, filling weekly medibox -- please call with any dose changes ([phone])      Anticoagulation Clinic INR History:  Date 1/26 2/16 3/9 3/23 4/21 5/1 5/11   Total Weekly Dose 9mg 9mg 9mg 9mg 9mg 10mg 10mg   INR 3.0 2.0 1.8 2.0 1.5 2.6 1.6   Notes  2 missed doses 1-2 missed doses 1 missed dose  Pre- procedure Missed dose     Date 6/2 6/15 6/29 7/20 8/3 8/22    Total Weekly Dose 9mg 9mg 9mg 8mg 8.5 mg 8.5mg 9.5 mg   INR 3.7 2.6 2.9 1.6 (PCP) 1.4 1.3    Notes    skipped 2 doses          Clinic Interview:  Tablet Strength: pt has 1mg tablets  Current Dose: 1mg daily except 1.5mg MonWedFri  Patient Findings      Negatives Signs/symptoms of thrombosis, Signs/symptoms of bleeding, Laboratory test error suspected, Change in health, Change in alcohol use, Change in activity, Upcoming invasive procedure, Emergency department visit, Upcoming dental procedure, Missed doses, Extra doses, Change in medications, Change in diet/appetite, Hospital admission, Bruising, Other complaints     Comments Stephanie is unable to verify that her mother has not missed any warfarin doses, but she rarely has issues now that she lives in the assisted living facility. She denies bleeding or bruising, especially from her nose (post cauterization).         Plan:  1. INR is subtherapeutic again today at 1.3. Pillboxes are filled at [facility] each week and patient receives her new box on Tuesdays -- already out for delivery today. Mrs. Kennedy has remained subtherapeutic for the past few visits, and given that she's experienced no changes in terms of medications/diet, will instruct her to boost today's dose to warfarin 2mg, then increase her maintenance dose to warfarin 1.5mg every day except for 1mg on Monday and Friday. Stephanie (dtr)  will try to adjust her mother's current medibox this afternoon, get additional tabs from home or [facility] to compensate for this dose increase, as needed (total increase 1.5 tabs). Have relayed planned maintenance dose change to [facility].  2. RTC in ~1.5 weeks.   3. No recent medication changes.  4. Verbal and written information provided. Pt and daughter express understanding and have no further questions at this time.      Vivek Rushing, PharmD Candidate 2018  Gina Ford PharmD  8/22/2017  3:17 PM

## 2017-08-31 ENCOUNTER — ANTICOAGULATION VISIT (OUTPATIENT)
Dept: PHARMACY | Facility: HOSPITAL | Age: 82
End: 2017-08-31

## 2017-08-31 DIAGNOSIS — I48.91 ATRIAL FIBRILLATION, UNSPECIFIED TYPE (HCC): ICD-10-CM

## 2017-08-31 LAB
INR PPP: 1.3 (ref 0.91–1.09)
PROTHROMBIN TIME: 15.3 SECONDS (ref 10–13.8)

## 2017-08-31 PROCEDURE — 36416 COLLJ CAPILLARY BLOOD SPEC: CPT

## 2017-08-31 PROCEDURE — 85610 PROTHROMBIN TIME: CPT

## 2017-08-31 PROCEDURE — G0463 HOSPITAL OUTPT CLINIC VISIT: HCPCS

## 2017-09-01 ENCOUNTER — TELEPHONE (OUTPATIENT)
Dept: PHARMACY | Facility: HOSPITAL | Age: 82
End: 2017-09-01

## 2017-09-05 NOTE — TELEPHONE ENCOUNTER
I think this is a good idea.  If patient is agreeable, we can make the switch to Eliquis mg bid once we know her INR is under 2.0.

## 2017-09-07 ENCOUNTER — ANTICOAGULATION VISIT (OUTPATIENT)
Dept: PHARMACY | Facility: HOSPITAL | Age: 82
End: 2017-09-07

## 2017-09-07 DIAGNOSIS — I48.91 ATRIAL FIBRILLATION, UNSPECIFIED TYPE (HCC): Primary | ICD-10-CM

## 2017-09-07 DIAGNOSIS — I48.91 ATRIAL FIBRILLATION, UNSPECIFIED TYPE (HCC): ICD-10-CM

## 2017-09-07 LAB
INR PPP: 1.6 (ref 0.91–1.09)
PROTHROMBIN TIME: 18.9 SECONDS (ref 10–13.8)

## 2017-09-07 PROCEDURE — G0463 HOSPITAL OUTPT CLINIC VISIT: HCPCS

## 2017-09-07 PROCEDURE — 36416 COLLJ CAPILLARY BLOOD SPEC: CPT

## 2017-09-07 PROCEDURE — 85610 PROTHROMBIN TIME: CPT

## 2017-09-07 NOTE — PROGRESS NOTES
Anticoagulation Clinic Progress Note  Indication: afib  Referring Provider: Eddie  Goal INR: 1.8-2.5 as of 6/29/17  Current Drug Interactions: levothyroxine  CHADS-VASc: 5 (age, gender, HTN, h/o AMI)  [facility] is now managing her medications, filling weekly medibox -- please call with any dose changes ([phone])  Pillboxes are filled at [facility] each week and patient receives her new box on Tuesdays.    Anticoagulation Clinic INR History:  Date 1/26 2/16 3/9 3/23 4/21 5/1 5/11   Total Weekly Dose 9mg 9mg 9mg 9mg 9mg 10mg 10mg   INR 3.0 2.0 1.8 2.0 1.5 2.6 1.6   Notes  2 missed doses 1-2 missed doses 1 missed dose  Pre- procedure Missed dose     Date 6/2 6/15 6/29 7/20 8/3 8/22 8/31 9/7   Total Weekly Dose 9mg 9mg 9mg 8mg 8.5 mg 8.5mg 9.5mg 11mg   INR 3.7 2.6 2.9 1.6 (PCP) 1.4 1.3 1.3 1.6   Notes    skipped 2 doses    post-boost       Clinic Interview:  Tablet Strength: pt has 1mg tablets  Current Dose: 1.5mg daily except 1mg MonFri  Patient Findings      Negatives Signs/symptoms of thrombosis, Signs/symptoms of bleeding, Laboratory test error suspected, Change in health, Change in alcohol use, Change in activity, Upcoming invasive procedure, Emergency department visit, Upcoming dental procedure, Missed doses, Extra doses, Change in medications, Change in diet/appetite, Hospital admission, Bruising, Other complaints     Comments Interview with Patient and Daughter. Discussed how we had conferred with LESLY Barbour about the patient being switched to Eliquis, and how if they were willing, she would be able to start tonight given that her INR today was below 2.0 - patient and daughter were willing to switch. [Addendum: Patient now being switched to Xarelto for insurance reasons].    Patient is now on Xarelto 15mg daily with dinner.     CLINIC VISIT: Ms. Kennedy will be switching to rivaroxaban tonight and stopping her warfarin. She was with her daughter today for counseling. Discussed  "the similarities and differences between apixaban and warfarin including how apixaban is dosed, the differences in reversal of agents, how similar the adverse effect profile is in terms of bleeding, and how she should continue to look for the same s/sx of bleeding and s/sx of any additional clots/PE/CVA. Counseled patient and her daughter that although apixaban does not have a direct antagonist like warfarin, it has a short half-life and is typically manageable by health care providers, and like with warfarin, they should let providers know that patient is on apixaban. Explained that she would no longer need to monitor her INR and that her next visit to our clinic would be a follow-up in about 3 months after having some labs drawn to assess various parameters such as kidney function and blood loss. Additionally, talked about how she would now be able to eat what she like in terms of green, leafy vegetables. Patient and her daughter expressed understanding of these various discussion points. Instructed them to continue informing her physician/pharmacist about any medication changes.     ADDENDUM TO CLINIC VISIT: Insurance \"requires a trial of at least 2 formulary alternatives\" before considering Eliquis. After discussion with providers, decided to switch from warfarin to rivaroxaban 15mg once daily with dinner assuming calculated CrCl of ~30ml/min. Called [facility] to call in the script (the Eliquis order was not put in); [facility] confirmed that they would be taking out warfarin from any future mediboxes and that they would  her daughter to remove warfarin from her current box when she picks up the script. Called patient's daughter (Stephanie Montague [phone]) to clarify with her the change. Counseled that would still be looking out for same s/sx of bleeding, similar adverse effect profile, and how other counseling and follow-up points still applied - the main difference from Eliquis being that " Xarelto is dosed once a day with dinner instead of twice a day. Patient's daughter voiced understanding.      Plan:  1. D/C warfarin. INR is 1.6 today. Given that her INR is below 3, she can be started on Xarelto tonight; patient's daughter will  new prescription from [facility] and [facility].  2. Plan to call in 1 week to assess change for any adverse effects, then schedule an appointment to have labs (BMP/CBC) drawn 3 months later.  3. No other recent medication changes.  4. Verbal information provided. Pt and daughter express understanding and have no further questions at this time.  5. Will call in prescription for Xarelto to [facility] and instruct them to remove warfarin from all of patient's medboxes.    Vivek Rushing, PharmD Candidate 2018  Gina Ford, PharmD  9/7/2017  1:14 PM       IGina, [facility], assisted in the patient interview. I have reviewed the note in full and agree with the assessment and plan.  09/07/17  4:49 PM

## 2017-09-07 NOTE — TELEPHONE ENCOUNTER
Insurance denied Eliquis -- requires that she try two formulary alternatives first. Will call in rx for low-dose Xarelto 15mg daily with dinner.

## 2017-09-08 ENCOUNTER — DOCUMENTATION (OUTPATIENT)
Dept: CARDIOLOGY | Facility: CLINIC | Age: 82
End: 2017-09-08

## 2017-09-25 RX ORDER — LISINOPRIL 2.5 MG/1
TABLET ORAL
Qty: 90 TABLET | Refills: 2 | Status: SHIPPED | OUTPATIENT
Start: 2017-09-25 | End: 2018-06-15 | Stop reason: SDUPTHER

## 2017-09-25 RX ORDER — TORSEMIDE 10 MG/1
TABLET ORAL
Qty: 90 TABLET | Refills: 1 | Status: SHIPPED | OUTPATIENT
Start: 2017-09-25 | End: 2018-09-10 | Stop reason: SDUPTHER

## 2017-10-20 RX ORDER — CARVEDILOL 6.25 MG/1
TABLET ORAL
Qty: 180 TABLET | Refills: 1 | Status: SHIPPED | OUTPATIENT
Start: 2017-10-20 | End: 2018-04-30 | Stop reason: SDUPTHER

## 2017-11-13 DIAGNOSIS — E78.5 HYPERLIPIDEMIA, UNSPECIFIED HYPERLIPIDEMIA TYPE: Primary | ICD-10-CM

## 2017-11-13 RX ORDER — ROSUVASTATIN CALCIUM 20 MG/1
20 TABLET, COATED ORAL DAILY
Qty: 90 TABLET | Refills: 0 | Status: SHIPPED | OUTPATIENT
Start: 2017-11-13 | End: 2018-02-12 | Stop reason: SDUPTHER

## 2017-11-27 DIAGNOSIS — I48.91 ATRIAL FIBRILLATION, UNSPECIFIED TYPE (HCC): ICD-10-CM

## 2017-11-28 RX ORDER — RIVAROXABAN 15 MG/1
TABLET, FILM COATED ORAL
Qty: 90 TABLET | Refills: 2 | Status: SHIPPED | OUTPATIENT
Start: 2017-11-28 | End: 2018-08-20 | Stop reason: SDUPTHER

## 2017-12-18 RX ORDER — TORSEMIDE 10 MG/1
TABLET ORAL
Qty: 90 TABLET | Refills: 0 | Status: SHIPPED | OUTPATIENT
Start: 2017-12-18 | End: 2018-01-11 | Stop reason: SDUPTHER

## 2018-01-11 ENCOUNTER — OFFICE VISIT (OUTPATIENT)
Dept: CARDIOLOGY | Facility: CLINIC | Age: 83
End: 2018-01-11

## 2018-01-11 VITALS
WEIGHT: 123.2 LBS | HEART RATE: 102 BPM | BODY MASS INDEX: 24.19 KG/M2 | SYSTOLIC BLOOD PRESSURE: 110 MMHG | DIASTOLIC BLOOD PRESSURE: 77 MMHG | HEIGHT: 60 IN

## 2018-01-11 DIAGNOSIS — I48.21 PERMANENT ATRIAL FIBRILLATION (HCC): Primary | ICD-10-CM

## 2018-01-11 DIAGNOSIS — E78.2 MIXED HYPERLIPIDEMIA: ICD-10-CM

## 2018-01-11 DIAGNOSIS — I10 ESSENTIAL HYPERTENSION: ICD-10-CM

## 2018-01-11 DIAGNOSIS — I25.10 CORONARY ARTERY DISEASE INVOLVING NATIVE CORONARY ARTERY OF NATIVE HEART WITHOUT ANGINA PECTORIS: ICD-10-CM

## 2018-01-11 PROCEDURE — 99213 OFFICE O/P EST LOW 20 MIN: CPT | Performed by: INTERNAL MEDICINE

## 2018-01-11 NOTE — PROGRESS NOTES
Sarah Kennedy  1/16/1929  [phone]      01/11/2018    Radha Santana MD    Chief Complaint: Atrial Fibrillation      PROBLEM LIST:  1. Atrial fibrillation:  a. CHADS score = 4.  b. Holter monitor, 06/09/2011: Average rate of 78 beats per minute with a minimal rate of 52, maximum rate of 108 and 6 pauses, the longest of which was 2.1 seconds.   c. Chronic Coumadin therapy.  d. Event monitor, July 2010: Heart rate measuring  beats per minute. Mild symptoms of lightheadedness not correlating with heart rate.  e. Echocardiogram 06/05/2009: Normal EF, mild MR, mildly aortic sclerosis, mild to moderate TR. RVSP of 44.  f. Echocardiogram, 06/12/2010: Mild MR, moderate TR, RVSP 42 mmHg. Normal EF.  g. Discontinuation of digoxin, March 2014.  h. Holter monitor, 04/04/2014, revealing atrial fibrillation with average rate of 66, occasional PVCs, multiple pauses over 2 seconds but all less than 3 seconds.  i. Hospitalization 10/2016 for afib with RVR. Troponin mildly elevated.   2. Coronary Artery Disease:  a. History of myocardial infarction, 1984.  b. Left heart catheterization, 2007, to unknown vessel.  c. Nuclear stress test, 03/22/2010: Very small sized defect and mild intensity in apical portion of anterior septal region. EF 68%.  d. Non-ST elevation MI, 02/22/2016 with cardiac catheterization by Lobito Steele MD with placement of a drug-eluting stent to the mid-LAD, drug-eluting to the mid-RCA, and a drug-eluting stent to the PDA. The stent in the LAD, 2.5 x 28 mm Xience drug-eluting stent; mid-right, 3.0 x 33 mm Xience drug-eluting stent. PDA, 2.5 x 28 mm Xience drug-eluting stent. EF 30% with anterolateral apical and inferior hypokinesis.   e. Cardiac Nuclear PET 10/11/2016: EF=42%. Positive PET scan for inducible ischemia versus solomon-infarct ischemia in the mid and distal anterior segment.   f. Cardiac catheterization 10/12/2016: 95% mid segment IntraStent restenosis of the LAD. Patent stents of the  RCA with nonocclusive disease of the mid RCA. Severe left regular systolic dysfunction, ejection fraction 25%. Successful PTCA of the LAD with 2.75 mm balloon reducing the 95% stenosis to no significant residual disease.   3. Presyncope.  4. History of transient ischemic attack x2 with last one 1 month ago.  5. Hypertension.  6. Hyperlipidemia.  7. Hypothyroidism.  8. Non-Hodgkin’s lymphoma.  9. Gastroesophageal reflux disease.  10. Glaucoma.  11. Chronic back and left leg pain, pain management per Dr. Lynch.  12. Surgical history:  a. Esophageal dilation.  b. Tonsillectomy.    Allergies   Allergen Reactions   • Atorvastatin        Current Medications:    Current Outpatient Prescriptions:   •  aspirin 81 MG EC tablet, Take 81 mg by mouth Daily., Disp: , Rfl:   •  carvedilol (COREG) 6.25 MG tablet, TAKE ONE TABLET BY MOUTH EVERY 12 HOURS AS DIRECTED -TAKE WITH FOOD, Disp: 180 tablet, Rfl: 1  •  clopidogrel (PLAVIX) 75 MG tablet, Take 75 mg by mouth Daily., Disp: , Rfl:   •  diltiaZEM (CARDIZEM) 90 MG tablet, Take 90 mg by mouth 2 (Two) Times a Day., Disp: , Rfl:   •  gabapentin (NEURONTIN) 100 MG capsule, Take 100 mg by mouth daily., Disp: , Rfl:   •  levothyroxine (SYNTHROID, LEVOTHROID) 75 MCG tablet, Take 75 mcg by mouth daily., Disp: , Rfl:   •  lisinopril (PRINIVIL,ZESTRIL) 2.5 MG tablet, TAKE ONE TABLET DAILY, Disp: 90 tablet, Rfl: 2  •  pantoprazole (PROTONIX) 40 MG EC tablet, Take 40 mg by mouth Daily., Disp: , Rfl:   •  rosuvastatin (CRESTOR) 20 MG tablet, Take 1 tablet by mouth Daily. Need lab work for further refills, Disp: 90 tablet, Rfl: 0  •  thiamine (VITAMINE B-1) 100 MG tablet, Take 100 mg by mouth daily., Disp: , Rfl:   •  torsemide (DEMADEX) 10 MG tablet, TAKE ONE TABLET DAILY, Disp: 90 tablet, Rfl: 1  •  XARELTO 15 MG tablet, TAKE ONE TABLET BY MOUTH DAILY WITH DINNER, Disp: 90 tablet, Rfl: 2    HPI  Sarah Kennedy returns today for follow up with a history of A-fib, coronary artery disease,  "presyncope, hypertension, and hyperlipidemia. Since last visit, patient has been doing well overall from a cardiovascular standpoint. She denies chest pain or shortness of breath. Since the last visit her nose has been bleeding a lot because she had a hole in her septum repaired, and the wound is not healing quickly because she unconsciously rubs her nose. Denies any complaints of chest pain, pressure, tightness, or unusual dyspnea. She does not follow a routine exercise regimen. She cannot walk. Dr. Santana follows her cholesterol.     The following portions of the patient's history were reviewed and updated as appropriate: allergies, current medications and problem list.    Pertinent positives as listed in the HPI.  All other systems reviewed are negative.    Vitals:    01/11/18 1354   BP: 110/77   BP Location: Left arm   Patient Position: Sitting   Pulse: 102   Weight: 55.9 kg (123 lb 3.2 oz)   Height: 152.4 cm (60\")       General: Alert, awake, and oriented  Neck: Jugular venous pressure is within normal limits. Carotids have normal upstrokes without bruits.   Cardiovascular: Heart has a nondisplaced focal PMI. Irregularly irregular rate and rhythm.   Lungs: Clear without rales or wheezes. Equal expansion is noted.   Extremities: Show no edema.  Skin: Warm and dry.  Neurologic: nonfocal    Diagnostic Data:  Lab Results   Component Value Date    CHLPL 177 02/23/2016    TRIG 184 (H) 02/23/2016    HDL 44 02/23/2016    LDLDIRECT 117 02/23/2016     Lab Results   Component Value Date    INR 1.6 (H) 09/07/2017    INR 1.3 (H) 08/31/2017    INR 1.3 (H) 08/22/2017    PROTIME 18.9 (H) 09/07/2017    PROTIME 15.3 (H) 08/31/2017    PROTIME 15.1 (H) 08/22/2017     Lab Results   Component Value Date    GLUCOSE 113 (H) 07/05/2017    BUN 29 (H) 07/05/2017    CREATININE 1.20 07/05/2017    EGFRIFNONA 42 (L) 07/05/2017    BCR 24.2 07/05/2017    K 4.5 07/05/2017    CO2 25.0 07/05/2017    CALCIUM 9.7 07/05/2017    ALBUMIN 4.30 " 07/05/2017    LABIL2 1.7 07/05/2017    AST 29 07/05/2017    ALT 13 07/05/2017     Labs, 7/24/2017  Potassium 4.8  Glucose 125  BUN 16  Creatinine 1.22  Procedures    Assessment:    ICD-10-CM ICD-9-CM   1. Permanent atrial fibrillation I48.2 427.31   2. Coronary artery disease involving native coronary artery of native heart without angina pectoris I25.10 414.01   3. Essential hypertension I10 401.9   4. Mixed hyperlipidemia E78.2 272.2       Plan:    1. Start routine exercise regimen; 30 minutes per day, 4-5 days per week. Advised to use an exercise bike to avoid joint pain   2. Stop Plavix to reduce bleeding And as it has been greater than one year since her last intervention  3. Continue current medications including aspirin and Xarelto   4. F/up in six months or sooner if needed.      Scribed for Jennifer Morgan MD by Nichelle Pace. 1/11/2018  2:09 PM    I Jennifer Morgan MD personally performed the services described in this documentation as scribed by the above individual in my presence, and it is both accurate and complete.    Jennifer Morgan MD, [facility]

## 2018-02-13 RX ORDER — ROSUVASTATIN CALCIUM 20 MG/1
TABLET, COATED ORAL
Qty: 90 TABLET | Refills: 0 | Status: SHIPPED | OUTPATIENT
Start: 2018-02-13 | End: 2018-05-14 | Stop reason: SDUPTHER

## 2018-03-23 RX ORDER — TORSEMIDE 10 MG/1
TABLET ORAL
Qty: 90 TABLET | Refills: 0 | Status: SHIPPED | OUTPATIENT
Start: 2018-03-23 | End: 2019-02-15 | Stop reason: HOSPADM

## 2018-04-30 RX ORDER — CARVEDILOL 6.25 MG/1
TABLET ORAL
Qty: 180 TABLET | Refills: 0 | Status: SHIPPED | OUTPATIENT
Start: 2018-04-30 | End: 2018-07-30 | Stop reason: SDUPTHER

## 2018-05-14 RX ORDER — ROSUVASTATIN CALCIUM 20 MG/1
TABLET, COATED ORAL
Qty: 30 TABLET | Refills: 0 | Status: SHIPPED | OUTPATIENT
Start: 2018-05-14 | End: 2018-06-15 | Stop reason: SDUPTHER

## 2018-06-15 RX ORDER — LISINOPRIL 2.5 MG/1
TABLET ORAL
Qty: 90 TABLET | Refills: 0 | Status: SHIPPED | OUTPATIENT
Start: 2018-06-15 | End: 2018-09-10 | Stop reason: SDUPTHER

## 2018-06-15 RX ORDER — ROSUVASTATIN CALCIUM 20 MG/1
TABLET, COATED ORAL
Qty: 30 TABLET | Refills: 0 | Status: SHIPPED | OUTPATIENT
Start: 2018-06-15 | End: 2018-07-13 | Stop reason: SDUPTHER

## 2018-07-16 RX ORDER — ROSUVASTATIN CALCIUM 20 MG/1
20 TABLET, COATED ORAL DAILY
Qty: 90 TABLET | Refills: 0 | Status: SHIPPED | OUTPATIENT
Start: 2018-07-16 | End: 2018-10-13 | Stop reason: SDUPTHER

## 2018-07-30 RX ORDER — CARVEDILOL 6.25 MG/1
TABLET ORAL
Qty: 180 TABLET | Refills: 0 | Status: SHIPPED | OUTPATIENT
Start: 2018-07-30 | End: 2018-10-27 | Stop reason: SDUPTHER

## 2018-08-20 DIAGNOSIS — I48.91 ATRIAL FIBRILLATION, UNSPECIFIED TYPE (HCC): ICD-10-CM

## 2018-08-21 RX ORDER — RIVAROXABAN 15 MG/1
TABLET, FILM COATED ORAL
Qty: 90 TABLET | Refills: 1 | Status: SHIPPED | OUTPATIENT
Start: 2018-08-21 | End: 2019-03-01 | Stop reason: SDUPTHER

## 2018-09-10 DIAGNOSIS — I10 HYPERTENSION, UNSPECIFIED TYPE: Primary | ICD-10-CM

## 2018-09-10 RX ORDER — LISINOPRIL 2.5 MG/1
TABLET ORAL
Qty: 90 TABLET | Refills: 1 | Status: SHIPPED | OUTPATIENT
Start: 2018-09-10 | End: 2019-02-15 | Stop reason: HOSPADM

## 2018-09-10 RX ORDER — TORSEMIDE 10 MG/1
TABLET ORAL
Qty: 90 TABLET | Refills: 0 | Status: SHIPPED | OUTPATIENT
Start: 2018-09-10 | End: 2019-02-15 | Stop reason: HOSPADM

## 2018-09-14 LAB
ALBUMIN SERPL-MCNC: 4.3 G/DL (ref 3.5–4.7)
ALP SERPL-CCNC: 112 IU/L (ref 39–117)
ALT SERPL-CCNC: 16 IU/L (ref 0–32)
AST SERPL-CCNC: 22 IU/L (ref 0–40)
BILIRUB DIRECT SERPL-MCNC: 0.16 MG/DL (ref 0–0.4)
BILIRUB SERPL-MCNC: 0.5 MG/DL (ref 0–1.2)
CHOLEST SERPL-MCNC: 189 MG/DL (ref 100–199)
HDLC SERPL-MCNC: 48 MG/DL
LDLC SERPL CALC-MCNC: 88 MG/DL (ref 0–99)
PROT SERPL-MCNC: 6.5 G/DL (ref 6–8.5)
TRIGL SERPL-MCNC: 266 MG/DL (ref 0–149)
VLDLC SERPL CALC-MCNC: 53 MG/DL (ref 5–40)

## 2018-10-15 RX ORDER — ROSUVASTATIN CALCIUM 20 MG/1
20 TABLET, COATED ORAL DAILY
Qty: 90 TABLET | Refills: 2 | Status: ON HOLD | OUTPATIENT
Start: 2018-10-15 | End: 2019-02-15 | Stop reason: SDUPTHER

## 2018-10-29 RX ORDER — CARVEDILOL 6.25 MG/1
TABLET ORAL
Qty: 180 TABLET | Refills: 1 | Status: SHIPPED | OUTPATIENT
Start: 2018-10-29 | End: 2019-02-15 | Stop reason: HOSPADM

## 2018-12-10 RX ORDER — TORSEMIDE 10 MG/1
TABLET ORAL
Qty: 90 TABLET | Refills: 0 | Status: SHIPPED | OUTPATIENT
Start: 2018-12-10 | End: 2019-02-15 | Stop reason: HOSPADM

## 2019-02-12 ENCOUNTER — LAB (OUTPATIENT)
Dept: LAB | Facility: HOSPITAL | Age: 84
End: 2019-02-12

## 2019-02-12 DIAGNOSIS — I10 HYPERTENSION, UNSPECIFIED TYPE: ICD-10-CM

## 2019-02-12 LAB
ALBUMIN SERPL-MCNC: 4.18 G/DL (ref 3.2–4.8)
ALBUMIN/GLOB SERPL: 2.2 G/DL (ref 1.5–2.5)
ALP SERPL-CCNC: 103 U/L (ref 25–100)
ALT SERPL W P-5'-P-CCNC: 12 U/L (ref 7–40)
ANION GAP SERPL CALCULATED.3IONS-SCNC: 9 MMOL/L (ref 3–11)
AST SERPL-CCNC: 21 U/L (ref 0–33)
BILIRUB SERPL-MCNC: 0.6 MG/DL (ref 0.3–1.2)
BUN BLD-MCNC: 16 MG/DL (ref 9–23)
BUN/CREAT SERPL: 12.7 (ref 7–25)
CALCIUM SPEC-SCNC: 9.3 MG/DL (ref 8.7–10.4)
CHLORIDE SERPL-SCNC: 105 MMOL/L (ref 99–109)
CO2 SERPL-SCNC: 25 MMOL/L (ref 20–31)
CREAT BLD-MCNC: 1.26 MG/DL (ref 0.6–1.3)
GFR SERPL CREATININE-BSD FRML MDRD: 40 ML/MIN/1.73
GLOBULIN UR ELPH-MCNC: 1.9 GM/DL
GLUCOSE BLD-MCNC: 128 MG/DL (ref 70–100)
POTASSIUM BLD-SCNC: 4.1 MMOL/L (ref 3.5–5.5)
PROT SERPL-MCNC: 6.1 G/DL (ref 5.7–8.2)
SODIUM BLD-SCNC: 139 MMOL/L (ref 132–146)

## 2019-02-12 PROCEDURE — 80053 COMPREHEN METABOLIC PANEL: CPT

## 2019-02-14 ENCOUNTER — HOSPITAL ENCOUNTER (OUTPATIENT)
Facility: HOSPITAL | Age: 84
Setting detail: OBSERVATION
Discharge: HOME | End: 2019-02-15
Attending: EMERGENCY MEDICINE | Admitting: EMERGENCY MEDICINE

## 2019-02-14 DIAGNOSIS — I95.9 HYPOTENSION, UNSPECIFIED HYPOTENSION TYPE: Primary | ICD-10-CM

## 2019-02-14 LAB
ANION GAP SERPL CALCULATED.3IONS-SCNC: 8 MMOL/L (ref 3–11)
BACTERIA UR QL AUTO: NORMAL /HPF
BASOPHILS # BLD AUTO: 0.02 10*3/MM3 (ref 0–0.2)
BASOPHILS NFR BLD AUTO: 0.4 % (ref 0–1)
BILIRUB UR QL STRIP: NEGATIVE
BUN BLD-MCNC: 19 MG/DL (ref 9–23)
BUN/CREAT SERPL: 13.6 (ref 7–25)
CALCIUM SPEC-SCNC: 9.1 MG/DL (ref 8.7–10.4)
CHLORIDE SERPL-SCNC: 105 MMOL/L (ref 99–109)
CLARITY UR: CLEAR
CO2 SERPL-SCNC: 27 MMOL/L (ref 20–31)
COLOR UR: YELLOW
CREAT BLD-MCNC: 1.4 MG/DL (ref 0.6–1.3)
DEPRECATED RDW RBC AUTO: 51.9 FL (ref 37–54)
EOSINOPHIL # BLD AUTO: 0.1 10*3/MM3 (ref 0–0.3)
EOSINOPHIL NFR BLD AUTO: 1.9 % (ref 0–3)
ERYTHROCYTE [DISTWIDTH] IN BLOOD BY AUTOMATED COUNT: 13.3 % (ref 11.3–14.5)
GFR SERPL CREATININE-BSD FRML MDRD: 35 ML/MIN/1.73
GLUCOSE BLD-MCNC: 93 MG/DL (ref 70–100)
GLUCOSE UR STRIP-MCNC: NEGATIVE MG/DL
HCT VFR BLD AUTO: 39.8 % (ref 34.5–44)
HGB BLD-MCNC: 12.9 G/DL (ref 11.5–15.5)
HGB UR QL STRIP.AUTO: ABNORMAL
HYALINE CASTS UR QL AUTO: NORMAL /LPF
IMM GRANULOCYTES # BLD AUTO: 0.02 10*3/MM3 (ref 0–0.05)
IMM GRANULOCYTES NFR BLD AUTO: 0.4 % (ref 0–0.6)
KETONES UR QL STRIP: NEGATIVE
LEUKOCYTE ESTERASE UR QL STRIP.AUTO: NEGATIVE
LYMPHOCYTES # BLD AUTO: 1.3 10*3/MM3 (ref 0.6–4.8)
LYMPHOCYTES NFR BLD AUTO: 25 % (ref 24–44)
MCH RBC QN AUTO: 34.7 PG (ref 27–31)
MCHC RBC AUTO-ENTMCNC: 32.4 G/DL (ref 32–36)
MCV RBC AUTO: 107 FL (ref 80–99)
MONOCYTES # BLD AUTO: 0.64 10*3/MM3 (ref 0–1)
MONOCYTES NFR BLD AUTO: 12.3 % (ref 0–12)
NEUTROPHILS # BLD AUTO: 3.13 10*3/MM3 (ref 1.5–8.3)
NEUTROPHILS NFR BLD AUTO: 60.4 % (ref 41–71)
NITRITE UR QL STRIP: NEGATIVE
PH UR STRIP.AUTO: <=5 [PH] (ref 5–8)
PLATELET # BLD AUTO: 148 10*3/MM3 (ref 150–450)
PMV BLD AUTO: 10.2 FL (ref 6–12)
POTASSIUM BLD-SCNC: 3.8 MMOL/L (ref 3.5–5.5)
PROT UR QL STRIP: NEGATIVE
RBC # BLD AUTO: 3.72 10*6/MM3 (ref 3.89–5.14)
RBC # UR: NORMAL /HPF
REF LAB TEST METHOD: NORMAL
SODIUM BLD-SCNC: 140 MMOL/L (ref 132–146)
SP GR UR STRIP: 1.01 (ref 1–1.03)
SQUAMOUS #/AREA URNS HPF: NORMAL /HPF
TROPONIN I SERPL-MCNC: 0.01 NG/ML
UROBILINOGEN UR QL STRIP: ABNORMAL
WBC NRBC COR # BLD: 5.19 10*3/MM3 (ref 3.5–10.8)
WBC UR QL AUTO: NORMAL /HPF

## 2019-02-14 PROCEDURE — 80048 BASIC METABOLIC PNL TOTAL CA: CPT | Performed by: EMERGENCY MEDICINE

## 2019-02-14 PROCEDURE — 99285 EMERGENCY DEPT VISIT HI MDM: CPT

## 2019-02-14 PROCEDURE — 84484 ASSAY OF TROPONIN QUANT: CPT | Performed by: EMERGENCY MEDICINE

## 2019-02-14 PROCEDURE — G0378 HOSPITAL OBSERVATION PER HR: HCPCS

## 2019-02-14 PROCEDURE — 99284 EMERGENCY DEPT VISIT MOD MDM: CPT

## 2019-02-14 PROCEDURE — 85025 COMPLETE CBC W/AUTO DIFF WBC: CPT | Performed by: EMERGENCY MEDICINE

## 2019-02-14 PROCEDURE — 81001 URINALYSIS AUTO W/SCOPE: CPT | Performed by: EMERGENCY MEDICINE

## 2019-02-14 PROCEDURE — P9612 CATHETERIZE FOR URINE SPEC: HCPCS

## 2019-02-14 PROCEDURE — 93005 ELECTROCARDIOGRAM TRACING: CPT | Performed by: EMERGENCY MEDICINE

## 2019-02-14 RX ORDER — SODIUM CHLORIDE 0.9 % (FLUSH) 0.9 %
10 SYRINGE (ML) INJECTION AS NEEDED
Status: DISCONTINUED | OUTPATIENT
Start: 2019-02-14 | End: 2019-02-15 | Stop reason: HOSPADM

## 2019-02-14 RX ADMIN — SODIUM CHLORIDE 500 ML: 9 INJECTION, SOLUTION INTRAVENOUS at 22:26

## 2019-02-15 ENCOUNTER — APPOINTMENT (OUTPATIENT)
Dept: CARDIOLOGY | Facility: HOSPITAL | Age: 84
End: 2019-02-15

## 2019-02-15 ENCOUNTER — APPOINTMENT (OUTPATIENT)
Dept: GENERAL RADIOLOGY | Facility: HOSPITAL | Age: 84
End: 2019-02-15

## 2019-02-15 VITALS
WEIGHT: 124 LBS | HEIGHT: 60 IN | SYSTOLIC BLOOD PRESSURE: 118 MMHG | TEMPERATURE: 97.9 F | DIASTOLIC BLOOD PRESSURE: 61 MMHG | RESPIRATION RATE: 16 BRPM | HEART RATE: 70 BPM | OXYGEN SATURATION: 97 % | BODY MASS INDEX: 24.35 KG/M2

## 2019-02-15 PROBLEM — N17.9 AKI (ACUTE KIDNEY INJURY) (HCC): Status: ACTIVE | Noted: 2019-02-15

## 2019-02-15 PROBLEM — I51.9 SYSTOLIC DYSFUNCTION: Status: ACTIVE | Noted: 2019-02-15

## 2019-02-15 LAB
ANION GAP SERPL CALCULATED.3IONS-SCNC: 8 MMOL/L (ref 3–11)
BH CV ECHO MEAS - AO ROOT AREA (BSA CORRECTED): 2.2
BH CV ECHO MEAS - AO ROOT AREA: 8.5 CM^2
BH CV ECHO MEAS - AO ROOT DIAM: 3.3 CM
BH CV ECHO MEAS - ASC AORTA: 3.4 CM
BH CV ECHO MEAS - BSA(HAYCOCK): 1.6 M^2
BH CV ECHO MEAS - BSA: 1.5 M^2
BH CV ECHO MEAS - BZI_BMI: 24.2 KILOGRAMS/M^2
BH CV ECHO MEAS - BZI_METRIC_HEIGHT: 152.4 CM
BH CV ECHO MEAS - BZI_METRIC_WEIGHT: 56.2 KG
BH CV ECHO MEAS - EDV(CUBED): 46.6 ML
BH CV ECHO MEAS - EDV(MOD-SP2): 49 ML
BH CV ECHO MEAS - EDV(MOD-SP4): 59 ML
BH CV ECHO MEAS - EDV(TEICH): 54.4 ML
BH CV ECHO MEAS - EF(CUBED): 72.9 %
BH CV ECHO MEAS - EF(MOD-BP): 52 %
BH CV ECHO MEAS - EF(MOD-SP2): 40.8 %
BH CV ECHO MEAS - EF(MOD-SP4): 59.3 %
BH CV ECHO MEAS - EF(TEICH): 65.6 %
BH CV ECHO MEAS - ESV(CUBED): 12.6 ML
BH CV ECHO MEAS - ESV(MOD-SP2): 29 ML
BH CV ECHO MEAS - ESV(MOD-SP4): 24 ML
BH CV ECHO MEAS - ESV(TEICH): 18.7 ML
BH CV ECHO MEAS - FS: 35.3 %
BH CV ECHO MEAS - IVS/LVPW: 0.94
BH CV ECHO MEAS - IVSD: 1 CM
BH CV ECHO MEAS - LA DIMENSION: 4.6 CM
BH CV ECHO MEAS - LA/AO: 1.4
BH CV ECHO MEAS - LAD MAJOR: 6.6 CM
BH CV ECHO MEAS - LAT PEAK E' VEL: 7.8 CM/SEC
BH CV ECHO MEAS - LATERAL E/E' RATIO: 12.3
BH CV ECHO MEAS - LV DIASTOLIC VOL/BSA (35-75): 38.7 ML/M^2
BH CV ECHO MEAS - LV IVRT: 0.07 SEC
BH CV ECHO MEAS - LV MASS(C)D: 116.8 GRAMS
BH CV ECHO MEAS - LV MASS(C)DI: 76.7 GRAMS/M^2
BH CV ECHO MEAS - LV SYSTOLIC VOL/BSA (12-30): 15.8 ML/M^2
BH CV ECHO MEAS - LVIDD: 3.6 CM
BH CV ECHO MEAS - LVIDS: 2.3 CM
BH CV ECHO MEAS - LVLD AP2: 5.5 CM
BH CV ECHO MEAS - LVLD AP4: 5.7 CM
BH CV ECHO MEAS - LVLS AP2: 5.1 CM
BH CV ECHO MEAS - LVLS AP4: 5.1 CM
BH CV ECHO MEAS - LVOT AREA (M): 3.5 CM^2
BH CV ECHO MEAS - LVOT AREA: 3.3 CM^2
BH CV ECHO MEAS - LVOT DIAM: 2.1 CM
BH CV ECHO MEAS - LVPWD: 1.1 CM
BH CV ECHO MEAS - MED PEAK E' VEL: 8.6 CM/SEC
BH CV ECHO MEAS - MEDIAL E/E' RATIO: 11.2
BH CV ECHO MEAS - MPA AREA: 8.7 CM^2
BH CV ECHO MEAS - MPA DIAM: 3.3 CM
BH CV ECHO MEAS - MV DEC TIME: 0.17 SEC
BH CV ECHO MEAS - MV E MAX VEL: 97.7 CM/SEC
BH CV ECHO MEAS - PA ACC SLOPE: 704.2 CM/SEC^2
BH CV ECHO MEAS - PA ACC TIME: 0.09 SEC
BH CV ECHO MEAS - PA PR(ACCEL): 40.2 MMHG
BH CV ECHO MEAS - PI END-D VEL: 132.5 CM/SEC
BH CV ECHO MEAS - PULM DIAS VEL: 53.3 CM/SEC
BH CV ECHO MEAS - PULM S/D: 0.62
BH CV ECHO MEAS - PULM SYS VEL: 33.1 CM/SEC
BH CV ECHO MEAS - RAP SYSTOLE: 8 MMHG
BH CV ECHO MEAS - RVSP: 55 MMHG
BH CV ECHO MEAS - SI(CUBED): 22.3 ML/M^2
BH CV ECHO MEAS - SI(MOD-SP2): 13.1 ML/M^2
BH CV ECHO MEAS - SI(MOD-SP4): 23 ML/M^2
BH CV ECHO MEAS - SI(TEICH): 23.4 ML/M^2
BH CV ECHO MEAS - SV(CUBED): 34 ML
BH CV ECHO MEAS - SV(MOD-SP2): 20 ML
BH CV ECHO MEAS - SV(MOD-SP4): 35 ML
BH CV ECHO MEAS - SV(TEICH): 35.7 ML
BH CV ECHO MEAS - TAPSE (>1.6): 1.6 CM2
BH CV ECHO MEAS - TR MAX PG: 47 MMHG
BH CV ECHO MEAS - TR MAX VEL: 343 CM/SEC
BH CV ECHO MEASUREMENTS AVERAGE E/E' RATIO: 11.91
BH CV VAS BP LEFT ARM: NORMAL MMHG
BH CV XLRA - RV BASE: 3.6 CM
BH CV XLRA - RV LENGTH: 5.4 CM
BH CV XLRA - RV MID: 2.9 CM
BH CV XLRA - TDI S': 10.3 CM/SEC
BH CV XLRA MEAS LEFT CCA RATIO VEL: 67.8 CM/SEC
BH CV XLRA MEAS LEFT DIST CCA EDV: 11.6 CM/SEC
BH CV XLRA MEAS LEFT DIST CCA PSV: 68.4 CM/SEC
BH CV XLRA MEAS LEFT DIST ICA EDV: 37.5 CM/SEC
BH CV XLRA MEAS LEFT DIST ICA PSV: 137.9 CM/SEC
BH CV XLRA MEAS LEFT ICA RATIO VEL: 94.3 CM/SEC
BH CV XLRA MEAS LEFT ICA/CCA RATIO: 1.4
BH CV XLRA MEAS LEFT MID CCA EDV: 15.7 CM/SEC
BH CV XLRA MEAS LEFT MID CCA PSV: 75.4 CM/SEC
BH CV XLRA MEAS LEFT MID ICA EDV: 36.4 CM/SEC
BH CV XLRA MEAS LEFT MID ICA PSV: 94.8 CM/SEC
BH CV XLRA MEAS LEFT PROX CCA EDV: 12.6 CM/SEC
BH CV XLRA MEAS LEFT PROX CCA PSV: 80.9 CM/SEC
BH CV XLRA MEAS LEFT PROX ECA PSV: 73.3 CM/SEC
BH CV XLRA MEAS LEFT PROX ICA EDV: 17.1 CM/SEC
BH CV XLRA MEAS LEFT PROX ICA PSV: 64.5 CM/SEC
BH CV XLRA MEAS LEFT PROX SCLA PSV: 135.5 CM/SEC
BH CV XLRA MEAS LEFT VERTEBRAL A EDV: 10.8 CM/SEC
BH CV XLRA MEAS LEFT VERTEBRAL A PSV: 34.2 CM/SEC
BH CV XLRA MEAS RIGHT CCA RATIO VEL: 59.9 CM/SEC
BH CV XLRA MEAS RIGHT DIST CCA EDV: 13.3 CM/SEC
BH CV XLRA MEAS RIGHT DIST CCA PSV: 60.4 CM/SEC
BH CV XLRA MEAS RIGHT DIST ICA EDV: 21.6 CM/SEC
BH CV XLRA MEAS RIGHT DIST ICA PSV: 104.6 CM/SEC
BH CV XLRA MEAS RIGHT ICA RATIO VEL: 77.1 CM/SEC
BH CV XLRA MEAS RIGHT ICA/CCA RATIO: 1.3
BH CV XLRA MEAS RIGHT MID CCA EDV: 12.3 CM/SEC
BH CV XLRA MEAS RIGHT MID CCA PSV: 58.9 CM/SEC
BH CV XLRA MEAS RIGHT MID ICA EDV: 25 CM/SEC
BH CV XLRA MEAS RIGHT MID ICA PSV: 77.6 CM/SEC
BH CV XLRA MEAS RIGHT PROX CCA EDV: 9.3 CM/SEC
BH CV XLRA MEAS RIGHT PROX CCA PSV: 57.9 CM/SEC
BH CV XLRA MEAS RIGHT PROX ECA PSV: 114.7 CM/SEC
BH CV XLRA MEAS RIGHT PROX ICA EDV: 14.7 CM/SEC
BH CV XLRA MEAS RIGHT PROX ICA PSV: 54.5 CM/SEC
BH CV XLRA MEAS RIGHT PROX SCLA EDV: 13.3 CM/SEC
BH CV XLRA MEAS RIGHT PROX SCLA PSV: 76.8 CM/SEC
BH CV XLRA MEAS RIGHT VERTEBRAL A EDV: 11.8 CM/SEC
BH CV XLRA MEAS RIGHT VERTEBRAL A PSV: 48 CM/SEC
BUN BLD-MCNC: 18 MG/DL (ref 9–23)
BUN/CREAT SERPL: 15.3 (ref 7–25)
CALCIUM SPEC-SCNC: 8.7 MG/DL (ref 8.7–10.4)
CHLORIDE SERPL-SCNC: 108 MMOL/L (ref 99–109)
CO2 SERPL-SCNC: 23 MMOL/L (ref 20–31)
CREAT BLD-MCNC: 1.18 MG/DL (ref 0.6–1.3)
DEPRECATED RDW RBC AUTO: 52.1 FL (ref 37–54)
ERYTHROCYTE [DISTWIDTH] IN BLOOD BY AUTOMATED COUNT: 13.3 % (ref 11.3–14.5)
FOLATE SERPL-MCNC: >24 NG/ML (ref 3.2–20)
GFR SERPL CREATININE-BSD FRML MDRD: 43 ML/MIN/1.73
GLUCOSE BLD-MCNC: 110 MG/DL (ref 70–100)
HCT VFR BLD AUTO: 40 % (ref 34.5–44)
HGB BLD-MCNC: 12.8 G/DL (ref 11.5–15.5)
LDH SERPL-CCNC: 184 U/L (ref 120–246)
LEFT ARM BP: NORMAL MMHG
LEFT ATRIUM VOLUME INDEX: 67.6 ML/M^2
LEFT ATRIUM VOLUME: 103 ML
LV EF 2D ECHO EST: 55 %
MCH RBC QN AUTO: 34.9 PG (ref 27–31)
MCHC RBC AUTO-ENTMCNC: 32 G/DL (ref 32–36)
MCV RBC AUTO: 109 FL (ref 80–99)
PLATELET # BLD AUTO: 139 10*3/MM3 (ref 150–450)
PMV BLD AUTO: 10.7 FL (ref 6–12)
POTASSIUM BLD-SCNC: 4.5 MMOL/L (ref 3.5–5.5)
RBC # BLD AUTO: 3.67 10*6/MM3 (ref 3.89–5.14)
SODIUM BLD-SCNC: 139 MMOL/L (ref 132–146)
TSH SERPL DL<=0.05 MIU/L-ACNC: 0.07 MIU/ML (ref 0.35–5.35)
VIT B12 BLD-MCNC: 376 PG/ML (ref 211–911)
WBC NRBC COR # BLD: 6.36 10*3/MM3 (ref 3.5–10.8)

## 2019-02-15 PROCEDURE — 93880 EXTRACRANIAL BILAT STUDY: CPT | Performed by: INTERNAL MEDICINE

## 2019-02-15 PROCEDURE — 99220 PR INITIAL OBSERVATION CARE/DAY 70 MINUTES: CPT | Performed by: FAMILY MEDICINE

## 2019-02-15 PROCEDURE — 83615 LACTATE (LD) (LDH) ENZYME: CPT | Performed by: INTERNAL MEDICINE

## 2019-02-15 PROCEDURE — 96361 HYDRATE IV INFUSION ADD-ON: CPT

## 2019-02-15 PROCEDURE — G0378 HOSPITAL OBSERVATION PER HR: HCPCS

## 2019-02-15 PROCEDURE — 93306 TTE W/DOPPLER COMPLETE: CPT

## 2019-02-15 PROCEDURE — 84443 ASSAY THYROID STIM HORMONE: CPT | Performed by: INTERNAL MEDICINE

## 2019-02-15 PROCEDURE — 93306 TTE W/DOPPLER COMPLETE: CPT | Performed by: INTERNAL MEDICINE

## 2019-02-15 PROCEDURE — 80048 BASIC METABOLIC PNL TOTAL CA: CPT | Performed by: PHYSICIAN ASSISTANT

## 2019-02-15 PROCEDURE — 71045 X-RAY EXAM CHEST 1 VIEW: CPT

## 2019-02-15 PROCEDURE — 93880 EXTRACRANIAL BILAT STUDY: CPT

## 2019-02-15 PROCEDURE — 85027 COMPLETE CBC AUTOMATED: CPT | Performed by: PHYSICIAN ASSISTANT

## 2019-02-15 PROCEDURE — 96360 HYDRATION IV INFUSION INIT: CPT

## 2019-02-15 PROCEDURE — 99217 PR OBSERVATION CARE DISCHARGE MANAGEMENT: CPT | Performed by: INTERNAL MEDICINE

## 2019-02-15 PROCEDURE — 82607 VITAMIN B-12: CPT | Performed by: INTERNAL MEDICINE

## 2019-02-15 PROCEDURE — 82746 ASSAY OF FOLIC ACID SERUM: CPT | Performed by: INTERNAL MEDICINE

## 2019-02-15 RX ORDER — PANTOPRAZOLE SODIUM 40 MG/1
40 TABLET, DELAYED RELEASE ORAL DAILY
Status: DISCONTINUED | OUTPATIENT
Start: 2019-02-15 | End: 2019-02-15 | Stop reason: HOSPADM

## 2019-02-15 RX ORDER — GABAPENTIN 100 MG/1
100 CAPSULE ORAL DAILY
Status: DISCONTINUED | OUTPATIENT
Start: 2019-02-15 | End: 2019-02-15 | Stop reason: HOSPADM

## 2019-02-15 RX ORDER — SODIUM CHLORIDE 0.9 % (FLUSH) 0.9 %
3-10 SYRINGE (ML) INJECTION AS NEEDED
Status: DISCONTINUED | OUTPATIENT
Start: 2019-02-15 | End: 2019-02-15 | Stop reason: HOSPADM

## 2019-02-15 RX ORDER — LEVOTHYROXINE SODIUM 0.07 MG/1
75 TABLET ORAL DAILY
Status: DISCONTINUED | OUTPATIENT
Start: 2019-02-15 | End: 2019-02-15 | Stop reason: HOSPADM

## 2019-02-15 RX ORDER — ASPIRIN 81 MG/1
81 TABLET ORAL DAILY
Status: DISCONTINUED | OUTPATIENT
Start: 2019-02-15 | End: 2019-02-15 | Stop reason: HOSPADM

## 2019-02-15 RX ORDER — ACETAMINOPHEN 325 MG/1
650 TABLET ORAL EVERY 4 HOURS PRN
Status: DISCONTINUED | OUTPATIENT
Start: 2019-02-15 | End: 2019-02-15 | Stop reason: HOSPADM

## 2019-02-15 RX ORDER — ROSUVASTATIN CALCIUM 20 MG/1
10 TABLET, COATED ORAL DAILY
Qty: 90 TABLET | Refills: 2 | Status: SHIPPED | OUTPATIENT
Start: 2019-02-15 | End: 2019-12-27 | Stop reason: DRUGHIGH

## 2019-02-15 RX ORDER — SODIUM CHLORIDE 9 MG/ML
50 INJECTION, SOLUTION INTRAVENOUS CONTINUOUS
Status: DISCONTINUED | OUTPATIENT
Start: 2019-02-15 | End: 2019-02-15

## 2019-02-15 RX ORDER — SODIUM CHLORIDE 0.9 % (FLUSH) 0.9 %
3 SYRINGE (ML) INJECTION EVERY 12 HOURS SCHEDULED
Status: DISCONTINUED | OUTPATIENT
Start: 2019-02-15 | End: 2019-02-15 | Stop reason: HOSPADM

## 2019-02-15 RX ORDER — CLOPIDOGREL BISULFATE 75 MG/1
75 TABLET ORAL DAILY
Status: DISCONTINUED | OUTPATIENT
Start: 2019-02-15 | End: 2019-02-15 | Stop reason: HOSPADM

## 2019-02-15 RX ADMIN — RIVAROXABAN 15 MG: 15 TABLET, FILM COATED ORAL at 17:24

## 2019-02-15 RX ADMIN — CLOPIDOGREL BISULFATE 75 MG: 75 TABLET ORAL at 09:31

## 2019-02-15 RX ADMIN — PANTOPRAZOLE SODIUM 40 MG: 40 TABLET, DELAYED RELEASE ORAL at 09:37

## 2019-02-15 RX ADMIN — SODIUM CHLORIDE 50 ML/HR: 9 INJECTION, SOLUTION INTRAVENOUS at 02:08

## 2019-02-15 RX ADMIN — Medication 3 ML: at 02:00

## 2019-02-15 RX ADMIN — LEVOTHYROXINE SODIUM 75 MCG: 75 TABLET ORAL at 06:04

## 2019-02-15 RX ADMIN — DILTIAZEM HYDROCHLORIDE 90 MG: 30 TABLET, FILM COATED ORAL at 02:19

## 2019-02-15 RX ADMIN — GABAPENTIN 100 MG: 100 CAPSULE ORAL at 09:32

## 2019-02-15 RX ADMIN — DILTIAZEM HYDROCHLORIDE 90 MG: 30 TABLET, FILM COATED ORAL at 17:25

## 2019-02-15 RX ADMIN — ASPIRIN 81 MG: 81 TABLET, COATED ORAL at 09:32

## 2019-02-15 NOTE — DISCHARGE SUMMARY
"    [facility] Medicine Services  DISCHARGE SUMMARY    Patient Name: Sarah Kennedy  : 1929  MRN: [id number]    Date of Admission: 2019  Date of Discharge: 02/15/19  Length of Stay: 0  Primary Care Physician: Radha Santana MD    Consults     No orders found for last 30 day(s).        Hospital Course     Presenting Problem:   Hypotension [I95.9]      Active Hospital Problems    Diagnosis Date Noted   • **Hypotension [I95.9] 2019   • BRYSON (acute kidney injury) (CMS/HCC) [N17.9] 02/15/2019   • Systolic dysfunction [I51.9] 02/15/2019   • Coronary artery disease [I25.10] 10/17/2016   • Chronic back pain [M54.9, G89.29] 10/17/2016   • HTN (hypertension) [I10] 10/11/2016   • Hyperlipidemia [E78.5] 10/11/2016   • Hx-TIA (transient ischemic attack) [Z86.73] 10/11/2016   • Hypothyroidism [E03.9] 10/11/2016   • GERD (gastroesophageal reflux disease) [K21.9] 10/11/2016   • Permanent atrial fibrillation (CMS/[facility]) [I48.2] 2016      Resolved Hospital Problems   No resolved problems to display.          Hospital Course:  Sarah Kennedy is a 89 y.o. female with a past medical history significant for hypertension, PAF on Xarelto, HTN, CAD, HLD, hypothyroidism, and GERD who presents with altered mental status and hypotension. She has a history of Alzheimer's dementia at baseline and lives in assisted living facility. She is however more confused than baseline per daughter. Vitals were taken and blood pressure was noted to be 65/55. Daughter notes that the patient has been more tired than usual. States she walks a few steps then seems SOB and has to sit down to \"reset herself\". Was concerned and brought the patient in for further evaluation. No other complaints at this time. No cough, congestion, fever, SOB, syncope, or focal weakness/parathsias. Patient herself denies any changes and does not know why she is being admitted.     Emergency department Evaluation: BP 83/61. Vitals " otherwise stable. Creatinine 1.4. Chemistry and hematology otherwise stable. UA negative. EKG demonstrates rate controlled atrial fibrillation.    Patient was hydrated and BP meds were held except for her diltiazem for rate control.  Her BP rebounded nicely- she felt much better- echo and carotids were done and were normal.  She was discahrged home in good condition.         Day of Discharge     HPI:   Patient feels good    Patient denies headaches, fever, chills, sore throat, shortness of breath, chest pain, cough, nausea or vomiting, diarrhea, abdominal pain or distension, joint pain, rash, itching or bleeding.  Vital Signs: Temp:  [97.6 °F (36.4 °C)-98.2 °F (36.8 °C)] 97.9 °F (36.6 °C)  Heart Rate:  [59-95] 70  Resp:  [16] 16  BP: ()/() 118/61     Physical Exam:  Patient is alert and talkative in no distress at rest  Neck is without mass or JVD  Heart is Reg wo murmur  Lungs are clear wo wheeze or crackle  Abd is soft without HSM or mass, not tender or distended  MAEW  Skin is without rash  Neurologic exam in nonfocal   Mood is appropriate      Pertinent  and/or Most Recent Results       Results from last 7 days   Lab Units 02/15/19  0458 02/14/19  2007 02/12/19  1023   WBC 10*3/mm3 6.36 5.19  --    HEMOGLOBIN g/dL 12.8 12.9  --    HEMATOCRIT % 40.0 39.8  --    PLATELETS 10*3/mm3 139* 148*  --    SODIUM mmol/L 139 140 139   POTASSIUM mmol/L 4.5 3.8 4.1   CHLORIDE mmol/L 108 105 105   CO2 mmol/L 23.0 27.0 25.0   BUN mg/dL 18 19 16   CREATININE mg/dL 1.18 1.40* 1.26   GLUCOSE mg/dL 110* 93 128*   CALCIUM mg/dL 8.7 9.1 9.3     Results from last 7 days   Lab Units 02/12/19  1023   BILIRUBIN mg/dL 0.6   ALK PHOS U/L 103*   ALT (SGPT) U/L 12   AST (SGOT) U/L 21          Invalid input(s): TG  Results from last 7 days   Lab Units 02/15/19  0458 02/14/19  2007   TSH mIU/mL 0.070*  --    TROPONIN I ng/mL  --  0.008     Brief Urine Lab Results  (Last result in the past 365 days)      Color   Clarity   Blood    Leuk Est   Nitrite   Protein   CREAT   Urine HCG        02/14/19 2022 Yellow Clear Small (1+) Negative Negative Negative             Results for orders placed during the hospital encounter of 02/14/19   Adult Transthoracic Echo Complete W/ Cont if Necessary Per Protocol    Narrative · Estimated EF = 55%.  · Left ventricular systolic function is normal.  · Mild-to-moderate mitral valve regurgitation is present  · Moderate tricuspid valve regurgitation is present.  · Calculated right ventricular systolic pressure from tricuspid   regurgitation is 55 mmHg.        Results for orders placed during the hospital encounter of 02/14/19   Duplex Carotid Ultrasound CAR    Narrative · Right internal carotid artery stenosis of 0-49%.  · Left internal carotid artery stenosis of 0-49%.  · Minimal bilateral carotid atherosclerosis          Xr Chest 1 View    Result Date: 2/15/2019  Impression: No acute finding.  D:  02/15/2019 E:  02/15/2019  This report was finalized on 2/15/2019 11:13 AM by Faheem Amezcua.               Discharge Details        Discharge Medications      Changes to Medications      Instructions Start Date   rosuvastatin 20 MG tablet  Commonly known as:  CRESTOR  What changed:  how much to take   10 mg, Oral, Daily         Continue These Medications      Instructions Start Date   aspirin 81 MG EC tablet   81 mg, Oral, Daily      clopidogrel 75 MG tablet  Commonly known as:  PLAVIX   75 mg, Oral, Daily      diltiaZEM 90 MG tablet  Commonly known as:  CARDIZEM   90 mg, Oral, 2 Times Daily      gabapentin 100 MG capsule  Commonly known as:  NEURONTIN   100 mg, Oral, Daily      levothyroxine 75 MCG tablet  Commonly known as:  SYNTHROID, LEVOTHROID   75 mcg, Oral, Daily      pantoprazole 40 MG EC tablet  Commonly known as:  PROTONIX   40 mg, Oral, Daily      thiamine 100 MG tablet  Commonly known as:  VITAMIN B-1   100 mg, Oral, Daily      XARELTO 15 MG tablet  Generic drug:  rivaroxaban   TAKE ONE TABLET BY MOUTH DAILY  WITH DINNER         Stop These Medications    carvedilol 6.25 MG tablet  Commonly known as:  COREG     lisinopril 2.5 MG tablet  Commonly known as:  PRINIVIL,ZESTRIL     torsemide 10 MG tablet  Commonly known as:  DEMADEX              Discharge Disposition:  Home or Self Care    Discharge Diet:    Regular  Discharge Activity:    As Tolerated  Special Instructions:    Future Appointments   Date Time Provider Department Center   2/28/2019  4:00 PM eJnnifer Morgan MD [facility] None       Additional Instructions for the Follow-ups that You Need to Schedule     Discharge Follow-up with PCP   As directed       Currently Documented PCP:    Radha Santana MD    PCP Phone Number:    [phone]     Follow Up Details:  See in one week for BP check               Time Spent on Discharge:  35 minutes    Electronically signed by Patricia Mcqueen MD 02/15/19 5:15 PM

## 2019-02-15 NOTE — PROGRESS NOTES
Discharge Planning Assessment  [facility]     Patient Name: Sarah Kennedy  MRN: [id number]  Today's Date: 2/15/2019    Admit Date: 2/14/2019    Discharge Needs Assessment     Row Name 02/15/19 1427       Living Environment    Lives With  other (see comments)    Name(s) of Who Lives With Patient  assisted living facility     Current Living Arrangements  independent/assisted living facility    Duration at Residence  [facility]     Primary Care Provided by  other (see comments)    Provides Primary Care For  no one, unable/limited ability to care for self    Family Caregiver if Needed  child(litzy), adult    Quality of Family Relationships  helpful;involved;supportive    Able to Return to Prior Arrangements  yes       Resource/Environmental Concerns    Resource/Environmental Concerns  none    Transportation Concerns  car, none       Transition Planning    Patient/Family Anticipates Transition to  home AL apartment     Patient/Family Anticipated Services at Transition  none    Transportation Anticipated  family or friend will provide       Discharge Needs Assessment    Readmission Within the Last 30 Days  no previous admission in last 30 days    Concerns to be Addressed  denies needs/concerns at this time;no discharge needs identified    Equipment Currently Used at Home  walker, rolling    Anticipated Changes Related to Illness  none    Equipment Needed After Discharge  none    Discharge Facility/Level of Care Needs  assisted living facility        Discharge Plan     Row Name 02/15/19 1428       Plan    Plan  Return to [facility]isted [facility]     Patient/Family in Agreement with Plan  yes    Plan Comments  Met with patient and daughter at bedside - patient helps get the patient's medications filled and sent to the assisted living facility, she also plans to transport the patient back to her AL apartment at [facility] when medically ready. Patient has a rolling walker at home but does not always use it.  Discussed the option for home health but daughter states the staff will be able to do blood pressure checks and that is her only concern at this time. Notified the daughter that should she change her mind and want the patient to have home health once she is discharged, then she should reach out to the patient's PCP (Radha Santana) for that order. Patient and daughter deny any further questions or concerns regarding discharge.     Final Discharge Disposition Code  01 - home or self-care        Destination      No service coordination in this encounter.      Durable Medical Equipment      No service coordination in this encounter.      Dialysis/Infusion      No service coordination in this encounter.      Home Medical Care      No service coordination in this encounter.      Community Resources      No service coordination in this encounter.          Demographic Summary     Row Name 02/15/19 1426       General Information    Admission Type  observation    Arrived From  other (see comments) [facility]     Referral Source  admission list    Reason for Consult  discharge planning    Preferred Language  English     Used During This Interaction  no       Contact Information    Permission Granted to Share Info With          Functional Status     Row Name 02/15/19 1427       Functional Status    Usual Activity Tolerance  fair    Current Activity Tolerance  fair       Functional Status, IADL    Medications  assistive person    Meal Preparation  completely dependent    Housekeeping  completely dependent    Laundry  completely dependent    Shopping  completely dependent    IADL Comments  lives in Assisted Living apartment        Mental Status    General Appearance WDL  WDL        Psychosocial    No documentation.       Abuse/Neglect    No documentation.       Legal    No documentation.       Substance Abuse    No documentation.       Patient Forms    No documentation.           Rosalee Ray,  RN

## 2019-02-15 NOTE — PROGRESS NOTES
"                  Clinical Nutrition     Nutrition Assessment  Reason for Visit:   Identified at risk by screening criteria, MST score 2+      Patient Name: Sarah Kennedy  YOB: 1929  MRN: [id number]  Date of Encounter: 02/15/19 2:26 PM  Admission date: 2/14/2019    Nutrition Assessment   Assessment     Hospital Problem List:    Hypotension    Permanent atrial fibrillation (CMS/HCC)    Hx-TIA (transient ischemic attack)    HTN (hypertension)    Hyperlipidemia    Hypothyroidism    GERD (gastroesophageal reflux disease)    Coronary artery disease    Chronic back pain    BRYSON (acute kidney injury) (CMS/HCC)    Systolic dysfunction      Applicable PMH:  Hx of cancer; Lymphoma  Dementia  Syncope    Applicable PSxH:  Left heart cath    Reported/Observed/Food/Nutrition Related History:     Pt reports no weight loss recently, no changes in appetite or PO intake as well. Per nursing documentation pt consumed 75% of breakfast tray this AM.     RD notes pt with dementia/alzheimer's; unsure of accuracy of answers at this time.     [facility] weight records reflects that pt has not experienced any recent weight loss as recorded.       Anthropometrics     Height: 152.4 cm (60\")  Last filed wt: Weight: 56.6 kg (124 lb 12.8 oz) (02/15/19 0158)  Weight Method: Standing scale    BMI: BMI (Calculated): 24.4  Normal: 18.5-24.9kg/m2    Ideal Body Weight (IBW) (kg): 45.86  Admission wt:124 lb 12.8 oz  Method obtained: standing scale (2/15)      Weight Change   UBW: 120 lb  Weight change: pt does not believe any weight loss has occurred    Last 15 Recorded Weights   View Complete Flowsheet   Weight Weight (kg) Weight (lbs) Weight Method VISIT REPORT   2/15/2019 56.609 kg 124 lb 12.8 oz Standing scale -   2/14/2019 58.968 kg 130 lb - -   1/11/2018 55.883 kg 123 lb 3.2 oz - Report   7/5/2017 47.174 kg 104 lb Stated -   6/29/2017 51.801 kg 114 lb 3.2 oz - Report   3/24/2017 56.7 kg 125 lb - -   10/27/2016 56.7 kg 125 lb - Report "   10/11/2016 56.518 kg 124 lb 9.6 oz - -   10/10/2016 56.7 kg 125 lb - -       Labs reviewed     Results from last 7 days   Lab Units 02/15/19  0458 02/14/19 2007 02/12/19  1023   GLUCOSE mg/dL 110* 93 128*   BUN mg/dL 18 19 16   CREATININE mg/dL 1.18 1.40* 1.26   SODIUM mmol/L 139 140 139   CHLORIDE mmol/L 108 105 105   POTASSIUM mmol/L 4.5 3.8 4.1   ALT (SGPT) U/L  --   --  12     Results from last 7 days   Lab Units 02/12/19  1023   ALBUMIN g/dL 4.18           Lab Results   Lab Value Date/Time    HGBA1C 6.1 (H) 02/22/2016 0620       Medications reviewed   Pertinent: Plavix, neurontin, synthroid, protonix, xarelto, IVF @ 50 ml/hr    Current Nutrition Prescription     PO: Diet Regular      Intake: insufficient data    75% of 1 meal recorded      Nutrition Diagnosis     2/15  Problem No nutrition diagnosis at this time   Etiology    Signs/Symptoms        Nutrition Intervention   1.  Follow treatment progress, Care plan reviewed  2. Interview for preferences, Encourage intake      Goal:   General: Nutrition support treatment  PO: Increase intake  Additional goals: pt is consistently able to consume > or equal to 67% of meal tray.       Monitoring/Evaluation:   Per protocol, PO intake, Pertinent labs, Symptoms      Will Continue to follow per protocol      Xiomara Hill RD  Time Spent: 30 minutes

## 2019-02-15 NOTE — H&P
"    [facility] Medicine Services  HISTORY AND PHYSICAL    Patient Name: Sarah Kennedy  : 1929  MRN: [id number]  Primary Care Physician: Radha Santana MD  Date of admission: 2019      Subjective   Subjective     Chief Complaint:  weakness    HPI:  Sarah Kennedy is a 89 y.o. female with a past medical history significant for hypertension, PAF on Xarelto, HTN, CAD, HLD, hypothyroidism, and GERD who presents with altered mental status and hypotension. She has a history of Alzheimer's dementia at baseline and lives in assisted living facility. She is however more confused than baseline per daughter. Vitals were taken and blood pressure was noted to be 65/55. Daughter notes that the patient has been more tired than usual. States she walks a few steps then seems SOB and has to sit down to \"reset herself\". Was concerned and brought the patient in for further evaluation. No other complaints at this time. No cough, congestion, fever, SOB, syncope, or focal weakness/parathsias. Patient herself denies any changes and does not know why she is being admitted.    Emergency department Evaluation: BP 83/61. Vitals otherwise stable. Creatinine 1.4. Chemistry and hematology otherwise stable. UA negative. EKG demonstrates rate controlled atrial fibrillation.    Review of Systems (deferred)      Otherwise complete ROS reviewed and is negative except as mentioned in the HPI.    Personal History     Past Medical History:   Diagnosis Date   • A-fib (CMS/[facility])    • Cancer (CMS/[facility])    • Chronic back pain 10/17/2016    Chronic back and left leg pain, pain management per Dr. Lynch.   • Coronary artery disease 10/17/2016    History of myocardial infarction, . Left heart catheterization, , to unknown vessel. Nuclear stress test, 2010:  Very small sized defect and mild intensity in apical portion of anterior septal region.  EF 68%. Non-ST elevation MI, 2016 with cardiac " catheterization by Lobito Steele MD with placement of a drug-eluting stent to the mid-LAD, drug-eluting to the mid-RCA, and a drug-eluting stent to the PDA.  The stent in the LAD, 2.5 x 28 mm Xience drug-eluting stent; mid-right, 3.0 x 33 mm Xience drug-eluting stent.  PDA, 2.5 x 28 mm Xience drug-eluting stent.  EF 30% with anterolateral apical and inferior hypokinesis.     • Dementia    • Disease of thyroid gland    • Heart attack (CMS/HCC)    • Hyperlipidemia    • Hypertension    • Lymphoma (CMS/HCC)    • Lymphoma (CMS/HCC)     Non-Hodgkin’s lymphoma   • Stroke (CMS/HCC)    • Syncope 10/17/2016    Presyncope        Past Surgical History:   Procedure Laterality Date   • CARDIAC CATHETERIZATION N/A 10/12/2016    Procedure: Left Heart Cath;  Surgeon: Nikia Chambers MD;  Location: [facility] CATH INVASIVE LOCATION;  Service:    • ESOPHAGEAL DILATATION     • NASAL SEPTAL RECONSTRUCTION     • TONSILLECTOMY         Family History: family history includes Heart attack in her father; Heart disease in her father, maternal grandfather, and maternal grandmother; No Known Problems in her mother. Otherwise pertinent FHx was reviewed and unremarkable.     Social History:  reports that  has never smoked. she has never used smokeless tobacco. She reports that she drinks about 0.6 - 1.2 oz of alcohol per week. She reports that she does not use drugs.  Social History     Social History Narrative    Lives in an assisted living facility       Medications:    Available home medication information reviewed.    (Not in a hospital admission)    Allergies   Allergen Reactions   • Atorvastatin        Objective   Objective     Vital Signs:   Temp:  [97.6 °F (36.4 °C)] 97.6 °F (36.4 °C)  Heart Rate:  [67-87] 86  Resp:  [16] 16  BP: ()/(49-93) 131/64        Physical Exam   Constitutional: No acute distress, awake, alert  Eyes: PERRLA, sclerae anicteric, no conjunctival injection  HENT: NCAT, mucous membranes moist  Neck: Supple, no  thyromegaly, no lymphadenopathy, trachea midline  Respiratory: Clear to auscultation bilaterally, nonlabored respirations   Cardiovascular: RRR, no murmurs, rubs, or gallops, palpable pedal pulses bilaterally  Gastrointestinal: Positive bowel sounds, soft, nontender, nondistended  Musculoskeletal: No bilateral ankle edema, no clubbing or cyanosis to extremities  Psychiatric: Appropriate affect, cooperative  Neurologic: Oriented x 3, strength symmetric in all extremities, Cranial Nerves grossly intact to confrontation, speech clear  Skin: No rashes      Results Reviewed:  I have personally reviewed current lab, radiology, and data and agree.    Results from last 7 days   Lab Units 02/14/19 2007   WBC 10*3/mm3 5.19   HEMOGLOBIN g/dL 12.9   HEMATOCRIT % 39.8   PLATELETS 10*3/mm3 148*     Results from last 7 days   Lab Units 02/14/19 2007 02/12/19  1023   SODIUM mmol/L 140 139   POTASSIUM mmol/L 3.8 4.1   CHLORIDE mmol/L 105 105   CO2 mmol/L 27.0 25.0   BUN mg/dL 19 16   CREATININE mg/dL 1.40* 1.26   GLUCOSE mg/dL 93 128*   CALCIUM mg/dL 9.1 9.3   ALT (SGPT) U/L  --  12   AST (SGOT) U/L  --  21   TROPONIN I ng/mL 0.008  --      Estimated Creatinine Clearance: 21.5 mL/min (A) (by C-G formula based on SCr of 1.4 mg/dL (H)).  Brief Urine Lab Results  (Last result in the past 365 days)      Color   Clarity   Blood   Leuk Est   Nitrite   Protein   CREAT   Urine HCG        02/14/19 2022 Yellow Clear Small (1+) Negative Negative Negative             No results found for: BNP  Imaging Results (last 24 hours)     ** No results found for the last 24 hours. **             Assessment/Plan   Assessment / Plan     Active Hospital Problems    Diagnosis Date Noted   • **Hypotension [I95.9] 02/14/2019     Priority: High   • BRYSON (acute kidney injury) (CMS/[facility]) [N17.9] 02/15/2019     Priority: High   • Coronary artery disease [I25.10] 10/17/2016     Priority: Medium     a. History of myocardial infarction, 1984.  b. Left heart  catheterization, 2007, to unknown vessel.  c. Nuclear stress test, 03/22/2010:  Very small sized defect and mild intensity in apical portion of anterior septal region.  EF 68%.  d. Non-ST elevation MI, 02/22/2016 with cardiac catheterization by Lobito Steele MD with placement of a drug-eluting stent to the mid-LAD, drug-eluting to the mid-RCA, and a drug-eluting stent to the PDA.  The stent in the LAD, 2.5 x 28 mm Xience drug-eluting stent; mid-right, 3.0 x 33 mm Xience drug-eluting stent.  PDA, 2.5 x 28 mm Xience drug-eluting stent.  EF 30% with anterolateral apical and inferior hypokinesis.       • HTN (hypertension) [I10] 10/11/2016     Priority: Medium   • Permanent atrial fibrillation (CMS/HCC) [I48.2] 09/01/2016     Priority: Medium     e. CHADS score = 4.  f. Holter monitor, 06/09/2011:  Average rate of 78 beats per minute with a minimal rate of 52, maximum rate of 108 and 6 pauses, the longest of which was 2.1 seconds.   g. Chronic Coumadin therapy.  h. Event monitor, July 2010:  Heart rate measuring  beats per minute.  Mild symptoms of lightheadedness not correlating with heart rate.  i. Echocardiogram 06/05/2009:  Normal EF, mild MR, mildly aortic sclerosis, mild to moderate TR.  RVSP of 44.  j. Echocardiogram, 06/12/2010:  Mild MR, moderate TR, RVSP 42 mmHg.  Normal EF.  k. Discontinuation of digoxin, March 2014.  l. Holter monitor, 04/04/2014, revealing atrial fibrillation with average rate of 66, occasional PVCs, multiple pauses over 2 seconds but all less than 3 seconds.  m. The patient is currently asymptomatic.       • Chronic back pain [M54.9, G89.29] 10/17/2016     Priority: Low     Chronic back and left leg pain, pain management per Dr. Lynch.     • Hyperlipidemia [E78.5] 10/11/2016     Priority: Low   • Hx-TIA (transient ischemic attack) [Z86.73] 10/11/2016     Priority: Low   • Hypothyroidism [E03.9] 10/11/2016     Priority: Low   • GERD (gastroesophageal reflux disease) [K21.9]  10/11/2016     Priority: Low         1. Hypotension:  - Patient heavily medicated on lisinopril, coreg, cardizem, and torsemide. Hold all except cardizem.  - continue cardizem with parameters  - gentle hydration with saline 50 cc/hr x 8 hours  - orthostatics  - obtain carotids and echocardiogram  - troponin negative  - follows with Eddie, no indication for inpatient consult at this time will plan for close f/u with Eddie outpt     2. BRYSON:  - last creatinine 1.26 up to 1.4  - continue with IVF as above  - avoid nephrotoxins    3. HTN:  - labile. Holding meds as above     4. PAF:  - stable and rate controlled.   - [id number] = 4. Was on coumadin. Now on xarelto    5. CHF:  - no recent echo on file. Appears stable and compensated.  - last heart cath 2016 demonstrated EF 25%  - monitor closely in the setting of IVF    DVT prophylaxis: xarelto    CODE STATUS:  Full code  Code Status and Medical Interventions:   Ordered at: 02/15/19 0016     Level Of Support Discussed With:    Patient     Code Status:    CPR     Medical Interventions (Level of Support Prior to Arrest):    Full       Admission Status:  I believe this patient meets OBSERVATION status, however if further evaluation or treatment plans warrant, status may change.  Based upon current information, I predict patient's care encounter to be less than or equal to 2 midnights.    Electronically signed by Juana Dee PA-C, 02/15/19, 12:18 AM.        Brief Attending Admission Attestation     I have seen and examined the patient, performing an independent face-to-face diagnostic evaluation with plan of care reviewed and developed with the advanced practice clinician (APC).      Brief Summary Statement/HPI:   Sarah Kennedy is a 89 y.o. female with PMHx of hypertension, PAF on Xarelto, HTN, CAD, HLD, hypothyroidism, and GERD brought to [facility] ED for increased confusion from her baseline and hypotension (reported as 65/55).  Pt is unaccompanied and a  poor historian due to dementia, unable to give any history of events earlier in day.  In ED, BP's labile with initial readings 118/49 but on repeat 83/61.  Decision made to monitor on tele as observation and adjust home meds contemporaneously.   Remainder of detailed HPI is as noted above and has been reviewed and/or edited by me for completeness.    Attending Physical Exam:  Constitutional: No acute distress, awake, alert, nontoxic, normal body habitus  Respiratory: Clear to auscultation bilaterally, good effort, nonlabored respirations   Cardiovascular: IRRR, no murmur  Musculoskeletal: No peripheral edema, normal muscle tone for age  Psychiatric: Appropriate affect, pleasantly confused, cooperative  Neurologic: Aware she is at hospital but doesn't know where she lives or any events of early today.  Moves all extremities equally, speech fluent.       Assessment/Plan :  See above section for further detailed assessment and plan developed with APC which I have reviewed and/or edited.      Electronically signed by Dayami Cormier MD, 02/15/19, 3:26 AM.

## 2019-02-15 NOTE — PLAN OF CARE
Problem: Patient Care Overview  Goal: Plan of Care Review  Outcome: Ongoing (interventions implemented as appropriate)  Pt bp meds held this am, closely monitored today but remains WNL. Patient ambulating in room, ECHO pending.    02/15/19 0603   Coping/Psychosocial   Plan of Care Reviewed With patient   Plan of Care Review   Progress improving     Goal: Individualization and Mutuality  Outcome: Ongoing (interventions implemented as appropriate)    Goal: Discharge Needs Assessment  Outcome: Ongoing (interventions implemented as appropriate)    Goal: Interprofessional Rounds/Family Conf  Outcome: Ongoing (interventions implemented as appropriate)      Problem: Renal Failure/Kidney Injury, Acute (Adult)  Goal: Signs and Symptoms of Listed Potential Problems Will be Absent, Minimized or Managed (Renal Failure/Kidney Injury, Acute)  Outcome: Ongoing (interventions implemented as appropriate)

## 2019-02-15 NOTE — ED PROVIDER NOTES
Subjective   Sarah Kennedy is a 89 y.o. female who presents to the ED with complaints of hypotension and generalized weakness. The patient has Alzheimer's and dementia and lives in an assisted living facility. The patient's daughter reports that the patient's blood pressure was 65/55 when taken at the assisted living facility today. The patient reports not feeling bad, but her daughter states otherwise. Her daughter reports that the patient told her she was tired and had to sit down after taking a few steps. The patient's daughter also states that she seemed short of breath and had to take deep breaths 3 separate times. The patient also told her daughter that she hated life while at the assisted living facility. Her daughter denies that their has been any change to medication. The patient denies vomiting, diarrhea, fever, chest pain, shortness of breath, and dizziness. There are no other acute complaints at this time.         History provided by:  Patient and relative  History limited by:  Dementia  Weakness - Generalized   Severity:  Moderate  Onset quality:  Sudden  Chronicity:  New  Associated symptoms: no chest pain, no diarrhea, no dizziness, no fever, no shortness of breath and no vomiting    Altered Mental Status   Associated symptoms: weakness    Associated symptoms: no fever and no vomiting        Review of Systems   Constitutional: Negative for fever.   Respiratory: Negative for shortness of breath.    Cardiovascular: Negative for chest pain.   Gastrointestinal: Negative for diarrhea and vomiting.   Neurological: Positive for weakness. Negative for dizziness.   All other systems reviewed and are negative.      Past Medical History:   Diagnosis Date   • A-fib (CMS/[facility])    • Cancer (CMS/[facility])    • Chronic back pain 10/17/2016    Chronic back and left leg pain, pain management per Dr. Lynch.   • Coronary artery disease 10/17/2016    History of myocardial infarction, 1984. Left heart catheterization, 2007, to  unknown vessel. Nuclear stress test, 03/22/2010:  Very small sized defect and mild intensity in apical portion of anterior septal region.  EF 68%. Non-ST elevation MI, 02/22/2016 with cardiac catheterization by Lobito Steele MD with placement of a drug-eluting stent to the mid-LAD, drug-eluting to the mid-RCA, and a drug-eluting stent to the PDA.  The stent in the LAD, 2.5 x 28 mm Xience drug-eluting stent; mid-right, 3.0 x 33 mm Xience drug-eluting stent.  PDA, 2.5 x 28 mm Xience drug-eluting stent.  EF 30% with anterolateral apical and inferior hypokinesis.     • Dementia    • Disease of thyroid gland    • Heart attack (CMS/HCC)    • Hyperlipidemia    • Hypertension    • Lymphoma (CMS/HCC)    • Lymphoma (CMS/HCC)     Non-Hodgkin’s lymphoma   • Stroke (CMS/HCC)    • Syncope 10/17/2016    Presyncope        Allergies   Allergen Reactions   • Atorvastatin        Past Surgical History:   Procedure Laterality Date   • CARDIAC CATHETERIZATION N/A 10/12/2016    Procedure: Left Heart Cath;  Surgeon: Nikia Chambers MD;  Location: [facility] CATH INVASIVE LOCATION;  Service:    • ESOPHAGEAL DILATATION     • NASAL SEPTAL RECONSTRUCTION     • TONSILLECTOMY         Family History   Problem Relation Age of Onset   • Heart disease Father    • Heart attack Father    • Heart disease Maternal Grandmother    • Heart disease Maternal Grandfather    • No Known Problems Mother        Social History     Socioeconomic History   • Marital status:      Spouse name: Not on file   • Number of children: Not on file   • Years of education: Not on file   • Highest education level: Not on file   Tobacco Use   • Smoking status: Never Smoker   • Smokeless tobacco: Never Used   Substance and Sexual Activity   • Alcohol use: Yes     Alcohol/week: 0.6 - 1.2 oz     Types: 1 - 2 Glasses of wine per week     Comment: Pt's daughter will no longer let her have any alcohol   • Drug use: No   • Sexual activity: No   Social History Narrative    Lives in  an assisted living facility         Objective   Physical Exam   Constitutional: She is oriented to person, place, and time. She appears well-developed and well-nourished. No distress.   HENT:   Head: Normocephalic and atraumatic.   Eyes: Conjunctivae are normal. No scleral icterus.   Neck: Normal range of motion. Neck supple.   Cardiovascular: Normal rate, regular rhythm and normal heart sounds.   No murmur heard.  Pulmonary/Chest: Effort normal and breath sounds normal. No respiratory distress.   Abdominal: Soft. There is no tenderness. There is no rebound and no guarding.   Musculoskeletal: Normal range of motion.   Neurological: She is alert and oriented to person, place, and time.   Demented.    Skin: Skin is warm and dry.   Psychiatric: She has a normal mood and affect. Her behavior is normal.   Nursing note and vitals reviewed.      Procedures         ED Course     Pt normotensive upon arrival but had a drop to 80 systolic while supine in the bed.  Orthostatics were negative.  EKG AF, stable.  Workup unyielding, labs and UA negative.  Patient stable on serial rechecks.  Discussed exam findings, test results so far and concerns in detail at the bedside.  Discussed need for admission for further evaluation and treatment.                  MDM  Number of Diagnoses or Management Options  Hypotension, unspecified hypotension type:      Amount and/or Complexity of Data Reviewed  Clinical lab tests: reviewed and ordered  Decide to obtain previous medical records or to obtain history from someone other than the patient: yes  Obtain history from someone other than the patient: yes  Discuss the patient with other providers: yes  Independent visualization of images, tracings, or specimens: yes        Final diagnoses:   Hypotension, unspecified hypotension type       Documentation assistance provided by libby Morton.  Information recorded by the libby was done at my direction and has been verified and  validated by me.     Lianna Morton  02/14/19 [id number]       Andreas Mott MD  02/14/19 1914

## 2019-02-15 NOTE — DISCHARGE INSTRUCTIONS
PLEASE CHECK BLOOD PRESSURE TWICE DAILY, ONCE WITH MORNING MEDICINE AND ONCE WITH AFTERNOON MEDS.

## 2019-02-28 ENCOUNTER — OFFICE VISIT (OUTPATIENT)
Dept: CARDIOLOGY | Facility: CLINIC | Age: 84
End: 2019-02-28

## 2019-02-28 VITALS
HEIGHT: 59 IN | DIASTOLIC BLOOD PRESSURE: 74 MMHG | BODY MASS INDEX: 25.04 KG/M2 | HEART RATE: 83 BPM | WEIGHT: 124.2 LBS | SYSTOLIC BLOOD PRESSURE: 130 MMHG

## 2019-02-28 DIAGNOSIS — I48.21 PERMANENT ATRIAL FIBRILLATION (HCC): Primary | ICD-10-CM

## 2019-02-28 DIAGNOSIS — I25.10 CORONARY ARTERY DISEASE INVOLVING NATIVE CORONARY ARTERY OF NATIVE HEART WITHOUT ANGINA PECTORIS: ICD-10-CM

## 2019-02-28 DIAGNOSIS — E78.2 MIXED HYPERLIPIDEMIA: ICD-10-CM

## 2019-02-28 DIAGNOSIS — I10 ESSENTIAL HYPERTENSION: ICD-10-CM

## 2019-02-28 PROCEDURE — 99213 OFFICE O/P EST LOW 20 MIN: CPT | Performed by: NURSE PRACTITIONER

## 2019-03-01 DIAGNOSIS — I48.91 ATRIAL FIBRILLATION, UNSPECIFIED TYPE (HCC): ICD-10-CM

## 2019-03-01 RX ORDER — CARVEDILOL 6.25 MG/1
6.25 TABLET ORAL 2 TIMES DAILY
Qty: 60 TABLET | Refills: 11 | Status: SHIPPED | OUTPATIENT
Start: 2019-03-01 | End: 2021-01-01

## 2019-03-01 RX ORDER — RIVAROXABAN 15 MG/1
TABLET, FILM COATED ORAL
Qty: 90 TABLET | Refills: 1 | Status: SHIPPED | OUTPATIENT
Start: 2019-03-01 | End: 2019-08-23 | Stop reason: SDUPTHER

## 2019-08-23 DIAGNOSIS — I48.91 ATRIAL FIBRILLATION, UNSPECIFIED TYPE (HCC): ICD-10-CM

## 2019-08-23 RX ORDER — RIVAROXABAN 15 MG/1
TABLET, FILM COATED ORAL
Qty: 90 TABLET | Refills: 3 | Status: SHIPPED | OUTPATIENT
Start: 2019-08-23 | End: 2020-09-21

## 2019-09-30 DIAGNOSIS — E78.2 MIXED HYPERLIPIDEMIA: Primary | ICD-10-CM

## 2019-09-30 RX ORDER — ROSUVASTATIN CALCIUM 20 MG/1
20 TABLET, COATED ORAL NIGHTLY
Qty: 90 TABLET | Refills: 0 | Status: SHIPPED | OUTPATIENT
Start: 2019-09-30 | End: 2019-12-27 | Stop reason: DRUGHIGH

## 2019-12-27 RX ORDER — ROSUVASTATIN CALCIUM 20 MG/1
20 TABLET, COATED ORAL NIGHTLY
Qty: 90 TABLET | Refills: 0 | Status: SHIPPED | OUTPATIENT
Start: 2019-12-27 | End: 2021-01-01

## 2020-01-01 DIAGNOSIS — I48.91 ATRIAL FIBRILLATION, UNSPECIFIED TYPE (HCC): ICD-10-CM

## 2020-09-21 DIAGNOSIS — I48.91 ATRIAL FIBRILLATION, UNSPECIFIED TYPE (HCC): ICD-10-CM

## 2020-09-21 RX ORDER — RIVAROXABAN 15 MG/1
TABLET, FILM COATED ORAL
Qty: 90 TABLET | Refills: 0 | Status: SHIPPED | OUTPATIENT
Start: 2020-09-21 | End: 2020-01-01

## 2020-11-09 ENCOUNTER — HOSPITAL ENCOUNTER (EMERGENCY)
Facility: HOSPITAL | Age: 85
Discharge: HOME | End: 2020-11-10
Attending: EMERGENCY MEDICINE | Admitting: EMERGENCY MEDICINE

## 2020-11-09 ENCOUNTER — APPOINTMENT (OUTPATIENT)
Dept: GENERAL RADIOLOGY | Facility: HOSPITAL | Age: 85
End: 2020-11-09

## 2020-11-09 ENCOUNTER — APPOINTMENT (OUTPATIENT)
Dept: CT IMAGING | Facility: HOSPITAL | Age: 85
End: 2020-11-09

## 2020-11-09 DIAGNOSIS — N39.0 URINARY TRACT INFECTION WITHOUT HEMATURIA, SITE UNSPECIFIED: Primary | ICD-10-CM

## 2020-11-09 LAB
ALBUMIN SERPL-MCNC: 4 G/DL (ref 3.5–5.2)
ALBUMIN/GLOB SERPL: 1.9 G/DL
ALP SERPL-CCNC: 86 U/L (ref 39–117)
ALT SERPL W P-5'-P-CCNC: 9 U/L (ref 1–33)
ANION GAP SERPL CALCULATED.3IONS-SCNC: 15 MMOL/L (ref 5–15)
AST SERPL-CCNC: 18 U/L (ref 1–32)
BASOPHILS # BLD AUTO: 0.03 10*3/MM3 (ref 0–0.2)
BASOPHILS NFR BLD AUTO: 0.7 % (ref 0–1.5)
BILIRUB SERPL-MCNC: 0.4 MG/DL (ref 0–1.2)
BUN SERPL-MCNC: 14 MG/DL (ref 8–23)
BUN/CREAT SERPL: 14 (ref 7–25)
CALCIUM SPEC-SCNC: 9.3 MG/DL (ref 8.2–9.6)
CHLORIDE SERPL-SCNC: 107 MMOL/L (ref 98–107)
CO2 SERPL-SCNC: 21 MMOL/L (ref 22–29)
CREAT SERPL-MCNC: 1 MG/DL (ref 0.57–1)
DEPRECATED RDW RBC AUTO: 48.4 FL (ref 37–54)
EOSINOPHIL # BLD AUTO: 0.19 10*3/MM3 (ref 0–0.4)
EOSINOPHIL NFR BLD AUTO: 4.2 % (ref 0.3–6.2)
ERYTHROCYTE [DISTWIDTH] IN BLOOD BY AUTOMATED COUNT: 12.6 % (ref 12.3–15.4)
GFR SERPL CREATININE-BSD FRML MDRD: 52 ML/MIN/1.73
GLOBULIN UR ELPH-MCNC: 2.1 GM/DL
GLUCOSE SERPL-MCNC: 114 MG/DL (ref 65–99)
HCT VFR BLD AUTO: 42 % (ref 34–46.6)
HGB BLD-MCNC: 13.5 G/DL (ref 12–15.9)
HOLD SPECIMEN: NORMAL
HOLD SPECIMEN: NORMAL
IMM GRANULOCYTES # BLD AUTO: 0.01 10*3/MM3 (ref 0–0.05)
IMM GRANULOCYTES NFR BLD AUTO: 0.2 % (ref 0–0.5)
LYMPHOCYTES # BLD AUTO: 1.49 10*3/MM3 (ref 0.7–3.1)
LYMPHOCYTES NFR BLD AUTO: 32.9 % (ref 19.6–45.3)
MAGNESIUM SERPL-MCNC: 2.2 MG/DL (ref 1.7–2.3)
MCH RBC QN AUTO: 33.3 PG (ref 26.6–33)
MCHC RBC AUTO-ENTMCNC: 32.1 G/DL (ref 31.5–35.7)
MCV RBC AUTO: 103.7 FL (ref 79–97)
MONOCYTES # BLD AUTO: 0.46 10*3/MM3 (ref 0.1–0.9)
MONOCYTES NFR BLD AUTO: 10.2 % (ref 5–12)
NEUTROPHILS NFR BLD AUTO: 2.35 10*3/MM3 (ref 1.7–7)
NEUTROPHILS NFR BLD AUTO: 51.8 % (ref 42.7–76)
NRBC BLD AUTO-RTO: 0 /100 WBC (ref 0–0.2)
PLATELET # BLD AUTO: 131 10*3/MM3 (ref 140–450)
PMV BLD AUTO: 10.4 FL (ref 6–12)
POTASSIUM SERPL-SCNC: 4.3 MMOL/L (ref 3.5–5.2)
PROT SERPL-MCNC: 6.1 G/DL (ref 6–8.5)
RBC # BLD AUTO: 4.05 10*6/MM3 (ref 3.77–5.28)
SODIUM SERPL-SCNC: 143 MMOL/L (ref 136–145)
TROPONIN T SERPL-MCNC: <0.01 NG/ML (ref 0–0.03)
TROPONIN T SERPL-MCNC: <0.01 NG/ML (ref 0–0.03)
WBC # BLD AUTO: 4.53 10*3/MM3 (ref 3.4–10.8)
WHOLE BLOOD HOLD SPECIMEN: NORMAL
WHOLE BLOOD HOLD SPECIMEN: NORMAL

## 2020-11-09 PROCEDURE — 84484 ASSAY OF TROPONIN QUANT: CPT | Performed by: PHYSICIAN ASSISTANT

## 2020-11-09 PROCEDURE — 70450 CT HEAD/BRAIN W/O DYE: CPT

## 2020-11-09 PROCEDURE — 71045 X-RAY EXAM CHEST 1 VIEW: CPT

## 2020-11-09 PROCEDURE — 99284 EMERGENCY DEPT VISIT MOD MDM: CPT

## 2020-11-09 PROCEDURE — 85025 COMPLETE CBC W/AUTO DIFF WBC: CPT | Performed by: EMERGENCY MEDICINE

## 2020-11-09 PROCEDURE — 83735 ASSAY OF MAGNESIUM: CPT | Performed by: EMERGENCY MEDICINE

## 2020-11-09 PROCEDURE — 93005 ELECTROCARDIOGRAM TRACING: CPT | Performed by: EMERGENCY MEDICINE

## 2020-11-09 PROCEDURE — 80053 COMPREHEN METABOLIC PANEL: CPT | Performed by: EMERGENCY MEDICINE

## 2020-11-09 PROCEDURE — 84484 ASSAY OF TROPONIN QUANT: CPT | Performed by: EMERGENCY MEDICINE

## 2020-11-09 RX ORDER — FERROUS SULFATE 325(65) MG
325 TABLET ORAL
COMMUNITY

## 2020-11-09 RX ORDER — MULTIPLE VITAMINS W/ MINERALS TAB 9MG-400MCG
1 TAB ORAL DAILY
COMMUNITY

## 2020-11-09 RX ORDER — UREA 10 %
800 LOTION (ML) TOPICAL DAILY
COMMUNITY

## 2020-11-09 RX ORDER — SODIUM CHLORIDE 0.9 % (FLUSH) 0.9 %
10 SYRINGE (ML) INJECTION AS NEEDED
Status: DISCONTINUED | OUTPATIENT
Start: 2020-11-09 | End: 2020-11-10 | Stop reason: HOSPADM

## 2020-11-09 RX ORDER — MIRTAZAPINE 30 MG/1
15 TABLET, FILM COATED ORAL NIGHTLY
COMMUNITY
End: 2021-01-01

## 2020-11-10 VITALS
BODY MASS INDEX: 21.2 KG/M2 | WEIGHT: 108 LBS | HEIGHT: 60 IN | TEMPERATURE: 98.3 F | HEART RATE: 68 BPM | SYSTOLIC BLOOD PRESSURE: 155 MMHG | RESPIRATION RATE: 18 BRPM | DIASTOLIC BLOOD PRESSURE: 89 MMHG | OXYGEN SATURATION: 96 %

## 2020-11-10 LAB
BACTERIA UR QL AUTO: ABNORMAL /HPF
BILIRUB UR QL STRIP: NEGATIVE
CLARITY UR: CLEAR
COLOR UR: YELLOW
GLUCOSE UR STRIP-MCNC: NEGATIVE MG/DL
HGB UR QL STRIP.AUTO: NEGATIVE
KETONES UR QL STRIP: NEGATIVE
LEUKOCYTE ESTERASE UR QL STRIP.AUTO: ABNORMAL
NITRITE UR QL STRIP: NEGATIVE
PH UR STRIP.AUTO: <=5 [PH] (ref 5–8)
PROT UR QL STRIP: NEGATIVE
RBC # UR: ABNORMAL /HPF
REF LAB TEST METHOD: ABNORMAL
SP GR UR STRIP: 1.02 (ref 1–1.03)
SQUAMOUS #/AREA URNS HPF: ABNORMAL /HPF
UROBILINOGEN UR QL STRIP: ABNORMAL
WBC UR QL AUTO: ABNORMAL /HPF

## 2020-11-10 PROCEDURE — 81001 URINALYSIS AUTO W/SCOPE: CPT | Performed by: EMERGENCY MEDICINE

## 2020-11-10 RX ORDER — CEFDINIR 300 MG/1
300 CAPSULE ORAL 2 TIMES DAILY
Qty: 14 CAPSULE | Refills: 0 | Status: SHIPPED | OUTPATIENT
Start: 2020-11-10 | End: 2020-11-17

## 2020-11-10 NOTE — ED PROVIDER NOTES
Subjective   Patient is a 89-year-old female who presents emergency room for evaluation following an episode where daughter who is present at bedside reported patient was acting confused.  Daughter reports that patient was having a good day today and was feeling fine, able to walk with her walker.  She states that she took a nap and upon awakening from her nap at approximately 5:30 PM she was still feeling well.  Daughter shares that she gave her mother a half a glass of wine and a baked potato and that she started not feeling well again.  Daughter reports that patient's legs were moving and when she asks the patient to stop the patient told her that she could not.  Daughter reports patient has similar episodes over the last 5 years that occur when patient's blood pressure is low.  She states that on EMS arrival patient blood pressure was low.  At the time of interview and exam, patient is alert, oriented and neurologically intact.          Review of Systems   Constitutional: Negative.    HENT: Negative.    Eyes: Positive for visual disturbance.   Respiratory: Negative.    Cardiovascular: Negative.    Gastrointestinal: Negative.    Genitourinary: Negative.    Musculoskeletal: Negative.    Skin: Negative.    Neurological: Positive for tremors.   Psychiatric/Behavioral: Negative.        Past Medical History:   Diagnosis Date   • A-fib (CMS/[facility])    • Cancer (CMS/[facility])    • Chronic back pain 10/17/2016    Chronic back and left leg pain, pain management per Dr. Lynch.   • Coronary artery disease 10/17/2016    History of myocardial infarction, 1984. Left heart catheterization, 2007, to unknown vessel. Nuclear stress test, 03/22/2010:  Very small sized defect and mild intensity in apical portion of anterior septal region.  EF 68%. Non-ST elevation MI, 02/22/2016 with cardiac catheterization by Lobito Steele MD with placement of a drug-eluting stent to the mid-LAD, drug-eluting to the mid-RCA, and a drug-eluting stent to  the PDA.  The stent in the LAD, 2.5 x 28 mm Xience drug-eluting stent; mid-right, 3.0 x 33 mm Xience drug-eluting stent.  PDA, 2.5 x 28 mm Xience drug-eluting stent.  EF 30% with anterolateral apical and inferior hypokinesis.     • Dementia (CMS/HCC)    • Disease of thyroid gland    • Heart attack (CMS/HCC)    • Hyperlipidemia    • Hypertension    • Lymphoma (CMS/HCC)    • Lymphoma (CMS/HCC)     Non-Hodgkin’s lymphoma   • Stroke (CMS/HCC)    • Syncope 10/17/2016    Presyncope        Allergies   Allergen Reactions   • Atorvastatin Myalgia       Past Surgical History:   Procedure Laterality Date   • CARDIAC CATHETERIZATION N/A 10/12/2016    Procedure: Left Heart Cath;  Surgeon: Nikia Chambers MD;  Location: [facility] CATH INVASIVE LOCATION;  Service:    • ESOPHAGEAL DILATATION     • NASAL SEPTAL RECONSTRUCTION     • TONSILLECTOMY         Family History   Problem Relation Age of Onset   • Heart disease Father    • Heart attack Father    • Heart disease Maternal Grandmother    • Heart disease Maternal Grandfather    • No Known Problems Mother        Social History     Socioeconomic History   • Marital status:      Spouse name: Not on file   • Number of children: Not on file   • Years of education: Not on file   • Highest education level: Not on file   Tobacco Use   • Smoking status: Never Smoker   • Smokeless tobacco: Never Used   Substance and Sexual Activity   • Alcohol use: Yes     Alcohol/week: 1.0 - 2.0 standard drinks     Types: 1 - 2 Glasses of wine per week     Comment: Pt's daughter will no longer let her have any alcohol   • Drug use: No   • Sexual activity: Never   Social History Narrative    Lives in an assisted living facility           Objective   Physical Exam  Vitals signs and nursing note reviewed.   Constitutional:       General: She is not in acute distress.     Appearance: She is well-developed. She is not ill-appearing or toxic-appearing.   HENT:      Head: Normocephalic and atraumatic.   Eyes:       Conjunctiva/sclera: Conjunctivae normal.   Neck:      Musculoskeletal: Normal range of motion and neck supple.   Cardiovascular:      Rate and Rhythm: Normal rate and regular rhythm.   Pulmonary:      Effort: Pulmonary effort is normal. No respiratory distress.      Breath sounds: Normal breath sounds.   Abdominal:      General: There is no distension.      Palpations: Abdomen is soft.      Tenderness: There is no abdominal tenderness.   Musculoskeletal: Normal range of motion.   Skin:     General: Skin is warm and dry.   Neurological:      Mental Status: She is alert and oriented to person, place, and time.      Cranial Nerves: No cranial nerve deficit.      Sensory: Sensation is intact.      Motor: No weakness, tremor, abnormal muscle tone or pronator drift.      Coordination: Finger-Nose-Finger Test and Heel to Shin Test normal.   Psychiatric:         Behavior: Behavior normal.         Thought Content: Thought content normal.         Judgment: Judgment normal.         Procedures           ED Course  ED Course as of Nov 10 0351   Tue Nov 10, 2020   0347 Patient presents to ED with an episode of confusion after waking from her nap, having a big potato and a glass of wine.  Neurologically intact on physical exam alert, oriented and able to answer all questions appropriately.  Asymptomatic while in the emergency department.  Negative CT scan of the head.  Chest x-ray without acute cardiopulmonary process.  Troponin negative and EKG without acute ST elevations, showing atrial fibrillation.  Patient with known atrial fibrillation and anticoagulated on Xarelto.  Urinalysis with evidence of urinary tract infection.  Patient prescribed antibiotics for outpatient treatment of her UTI.  Patient will be discharged home with continued follow-up to her primary care physician.  Patient and family agreeable to plan of care of outpatient follow-up, given return precautions and showed understanding.    [JG]      ED Course  User Index  [JG] Lexx Cisneros PA      Recent Results (from the past 24 hour(s))   Comprehensive Metabolic Panel    Collection Time: 11/09/20  7:10 PM    Specimen: Blood   Result Value Ref Range    Glucose 114 (H) 65 - 99 mg/dL    BUN 14 8 - 23 mg/dL    Creatinine 1.00 0.57 - 1.00 mg/dL    Sodium 143 136 - 145 mmol/L    Potassium 4.3 3.5 - 5.2 mmol/L    Chloride 107 98 - 107 mmol/L    CO2 21.0 (L) 22.0 - 29.0 mmol/L    Calcium 9.3 8.2 - 9.6 mg/dL    Total Protein 6.1 6.0 - 8.5 g/dL    Albumin 4.00 3.50 - 5.20 g/dL    ALT (SGPT) 9 1 - 33 U/L    AST (SGOT) 18 1 - 32 U/L    Alkaline Phosphatase 86 39 - 117 U/L    Total Bilirubin 0.4 0.0 - 1.2 mg/dL    eGFR Non African Amer 52 (L) >60 mL/min/1.73    Globulin 2.1 gm/dL    A/G Ratio 1.9 g/dL    BUN/Creatinine Ratio 14.0 7.0 - 25.0    Anion Gap 15.0 5.0 - 15.0 mmol/L   Troponin    Collection Time: 11/09/20  7:10 PM    Specimen: Blood   Result Value Ref Range    Troponin T <0.010 0.000 - 0.030 ng/mL   Magnesium    Collection Time: 11/09/20  7:10 PM    Specimen: Blood   Result Value Ref Range    Magnesium 2.2 1.7 - 2.3 mg/dL   Light Blue Top    Collection Time: 11/09/20  7:10 PM   Result Value Ref Range    Extra Tube hold for add-on    Green Top (Gel)    Collection Time: 11/09/20  7:10 PM   Result Value Ref Range    Extra Tube Hold for add-ons.    Lavender Top    Collection Time: 11/09/20  7:10 PM   Result Value Ref Range    Extra Tube hold for add-on    Gold Top - SST    Collection Time: 11/09/20  7:10 PM   Result Value Ref Range    Extra Tube Hold for add-ons.    CBC Auto Differential    Collection Time: 11/09/20  7:10 PM    Specimen: Blood   Result Value Ref Range    WBC 4.53 3.40 - 10.80 10*3/mm3    RBC 4.05 3.77 - 5.28 10*6/mm3    Hemoglobin 13.5 12.0 - 15.9 g/dL    Hematocrit 42.0 34.0 - 46.6 %    .7 (H) 79.0 - 97.0 fL    MCH 33.3 (H) 26.6 - 33.0 pg    MCHC 32.1 31.5 - 35.7 g/dL    RDW 12.6 12.3 - 15.4 %    RDW-SD 48.4 37.0 - 54.0 fl    MPV 10.4 6.0 - 12.0 fL     Platelets 131 (L) 140 - 450 10*3/mm3    Neutrophil % 51.8 42.7 - 76.0 %    Lymphocyte % 32.9 19.6 - 45.3 %    Monocyte % 10.2 5.0 - 12.0 %    Eosinophil % 4.2 0.3 - 6.2 %    Basophil % 0.7 0.0 - 1.5 %    Immature Grans % 0.2 0.0 - 0.5 %    Neutrophils, Absolute 2.35 1.70 - 7.00 10*3/mm3    Lymphocytes, Absolute 1.49 0.70 - 3.10 10*3/mm3    Monocytes, Absolute 0.46 0.10 - 0.90 10*3/mm3    Eosinophils, Absolute 0.19 0.00 - 0.40 10*3/mm3    Basophils, Absolute 0.03 0.00 - 0.20 10*3/mm3    Immature Grans, Absolute 0.01 0.00 - 0.05 10*3/mm3    nRBC 0.0 0.0 - 0.2 /100 WBC   Troponin    Collection Time: 11/09/20 10:20 PM    Specimen: Blood   Result Value Ref Range    Troponin T <0.010 0.000 - 0.030 ng/mL   Urinalysis With Microscopic If Indicated (No Culture) - Urine, Clean Catch    Collection Time: 11/10/20 12:34 AM    Specimen: Urine, Clean Catch   Result Value Ref Range    Color, UA Yellow Yellow, Straw    Appearance, UA Clear Clear    pH, UA <=5.0 5.0 - 8.0    Specific Gravity, UA 1.016 1.001 - 1.030    Glucose, UA Negative Negative    Ketones, UA Negative Negative    Bilirubin, UA Negative Negative    Blood, UA Negative Negative    Protein, UA Negative Negative    Leuk Esterase, UA Large (3+) (A) Negative    Nitrite, UA Negative Negative    Urobilinogen, UA 0.2 E.U./dL 0.2 - 1.0 E.U./dL   Urinalysis, Microscopic Only - Urine, Clean Catch    Collection Time: 11/10/20 12:34 AM    Specimen: Urine, Clean Catch   Result Value Ref Range    RBC, UA 0-2 None Seen, 0-2 /HPF    WBC, UA 21-30 (A) None Seen, 0-2 /HPF    Bacteria, UA None Seen None Seen, Trace /HPF    Squamous Epithelial Cells, UA 0-2 None Seen, 0-2 /HPF    Methodology Manual Light Microscopy      Note: In addition to lab results from this visit, the labs listed above may include labs taken at another facility or during a different encounter within the last 24 hours. Please correlate lab times with ED admission and discharge times for further clarification of  the services performed during this visit.    CT Head Without Contrast   Final Result   1.  No visible acute intracranial abnormality.   2.  Diffuse chronic changes as noted above.   3.  Chronic calcified meningioma along the inner table of the left anterior frontal skull measuring up to 2.0 cm.      Signer Name: Jesus Taveras MD    Signed: 11/9/2020 9:11 PM    Work[facility]     Radiology [facility]      XR Chest 1 View   Final Result   1.  No active disease. Stable cardiomegaly.   2.  Shallow lung expansion. Mild basilar atelectasis.      Signer Name: Jesus Taveras MD    Signed: 11/9/2020 8:14 PM    Workstation Name: [facility]     [facility]        Vitals:    11/09/20 2300 11/09/20 2330 11/10/20 0000 11/10/20 0130   BP: 164/92 168/84 171/80 155/89   BP Location:       Patient Position:       Pulse: 76 68 66 68   Resp:       Temp:       TempSrc:       SpO2: 97% 95% 97% 96%   Weight:       Height:         Medications   sodium chloride 0.9 % flush 10 mL (has no administration in time range)     ECG/EMG Results (last 24 hours)     Procedure Component Value Units Date/Time    ECG 12 Lead [[id number]] Collected: 11/09/20 1932     Updated: 11/09/20 1933        ECG 12 Lead                DISCHARGE    Patient discharged in stable condition.    Reviewed implications of results, diagnosis, meds, responsibility to follow up, warning signs and symptoms of possible worsening, potential complications and reasons to return to ER.    Patient/Family voiced understanding of above instructions.    Discussed plan for discharge, as there is no emergent indication for admission.  Pt/family is agreeable and understands need for follow up and possible repeat testing.  Pt/family is aware that discharge does not mean that nothing is wrong but that it indicates no emergency is currently present that requires admission and they must continue care with follow-up as given  below or with a physician of their choice.     FOLLOW-UP  Martha Montague PA  [address]   [place] [id number]  [phone]    Schedule an appointment as soon as possible for a visit       [facility] Emergency Department  [address] [address] [phone]  Go to   If symptoms worsen         Medication List      New Prescriptions    cefdinir 300 MG capsule  Commonly known as: OMNICEF  Take 1 capsule by mouth 2 (Two) Times a Day for 7 days.           Where to Get Your Medications      You can get these medications from any pharmacy    Bring a paper prescription for each of these medications  · cefdinir 300 MG capsule                                     MDM  Number of Diagnoses or Management Options  Urinary tract infection without hematuria, site unspecified: new and requires workup     Amount and/or Complexity of Data Reviewed  Clinical lab tests: reviewed  Tests in the radiology section of CPT®: reviewed  Tests in the medicine section of CPT®: reviewed    Risk of Complications, Morbidity, and/or Mortality  Presenting problems: moderate  Diagnostic procedures: moderate  Management options: moderate    Patient Progress  Patient progress: stable      Final diagnoses:   Urinary tract infection without hematuria, site unspecified            Lexx Cisneros PA  11/10/20 0351

## 2020-11-10 NOTE — DISCHARGE INSTRUCTIONS
Symptomatic care is recommended.  Take all medications as prescribed and instructed.  Follow-up with primary care return to ED with worsening of symptoms.  Take and complete full course of antibiotics as prescribed.

## 2020-11-11 LAB
QT INTERVAL: 388 MS
QTC INTERVAL: 436 MS

## 2021-01-01 ENCOUNTER — APPOINTMENT (OUTPATIENT)
Dept: GENERAL RADIOLOGY | Facility: HOSPITAL | Age: 86
End: 2021-01-01

## 2021-01-01 ENCOUNTER — HOSPITAL ENCOUNTER (INPATIENT)
Facility: HOSPITAL | Age: 86
LOS: 7 days | End: 2021-12-05
Attending: INTERNAL MEDICINE | Admitting: INTERNAL MEDICINE

## 2021-01-01 ENCOUNTER — APPOINTMENT (OUTPATIENT)
Dept: CARDIOLOGY | Facility: HOSPITAL | Age: 86
End: 2021-01-01

## 2021-01-01 ENCOUNTER — HOME CARE VISIT (OUTPATIENT)
Dept: HOME HEALTH SERVICES | Facility: HOME HEALTHCARE | Age: 86
End: 2021-01-01

## 2021-01-01 ENCOUNTER — READMISSION MANAGEMENT (OUTPATIENT)
Dept: CALL CENTER | Facility: HOSPITAL | Age: 86
End: 2021-01-01

## 2021-01-01 ENCOUNTER — APPOINTMENT (OUTPATIENT)
Dept: CT IMAGING | Facility: HOSPITAL | Age: 86
End: 2021-01-01

## 2021-01-01 ENCOUNTER — APPOINTMENT (OUTPATIENT)
Dept: MRI IMAGING | Facility: HOSPITAL | Age: 86
End: 2021-01-01

## 2021-01-01 ENCOUNTER — HOSPITAL ENCOUNTER (INPATIENT)
Facility: HOSPITAL | Age: 86
LOS: 2 days | Discharge: HOME HEALTH | End: 2021-06-19
Attending: EMERGENCY MEDICINE | Admitting: INTERNAL MEDICINE

## 2021-01-01 ENCOUNTER — HOME HEALTH ADMISSION (OUTPATIENT)
Dept: HOME HEALTH SERVICES | Facility: HOME HEALTHCARE | Age: 86
End: 2021-01-01

## 2021-01-01 ENCOUNTER — HOSPITAL ENCOUNTER (EMERGENCY)
Facility: HOSPITAL | Age: 86
Discharge: HOME | End: 2021-07-19
Attending: EMERGENCY MEDICINE | Admitting: EMERGENCY MEDICINE

## 2021-01-01 ENCOUNTER — HOSPITAL ENCOUNTER (INPATIENT)
Facility: HOSPITAL | Age: 86
LOS: 2 days | End: 2021-11-28
Attending: EMERGENCY MEDICINE | Admitting: FAMILY MEDICINE

## 2021-01-01 ENCOUNTER — OFFICE VISIT (OUTPATIENT)
Dept: CARDIOLOGY | Facility: CLINIC | Age: 86
End: 2021-01-01

## 2021-01-01 VITALS
HEIGHT: 60 IN | RESPIRATION RATE: 18 BRPM | HEART RATE: 71 BPM | BODY MASS INDEX: 23.56 KG/M2 | SYSTOLIC BLOOD PRESSURE: 129 MMHG | DIASTOLIC BLOOD PRESSURE: 69 MMHG | TEMPERATURE: 98 F | WEIGHT: 120 LBS | OXYGEN SATURATION: 95 %

## 2021-01-01 VITALS
OXYGEN SATURATION: 98 % | HEART RATE: 76 BPM | DIASTOLIC BLOOD PRESSURE: 76 MMHG | RESPIRATION RATE: 16 BRPM | TEMPERATURE: 97.3 F | SYSTOLIC BLOOD PRESSURE: 118 MMHG

## 2021-01-01 VITALS
HEIGHT: 60 IN | DIASTOLIC BLOOD PRESSURE: 58 MMHG | OXYGEN SATURATION: 96 % | BODY MASS INDEX: 22.58 KG/M2 | SYSTOLIC BLOOD PRESSURE: 102 MMHG | TEMPERATURE: 97.5 F | HEART RATE: 80 BPM | WEIGHT: 115 LBS

## 2021-01-01 VITALS
RESPIRATION RATE: 15 BRPM | SYSTOLIC BLOOD PRESSURE: 169 MMHG | HEIGHT: 60 IN | DIASTOLIC BLOOD PRESSURE: 62 MMHG | TEMPERATURE: 96.7 F | BODY MASS INDEX: 21.85 KG/M2 | WEIGHT: 111.3 LBS | OXYGEN SATURATION: 97 % | HEART RATE: 75 BPM

## 2021-01-01 VITALS
WEIGHT: 122 LBS | SYSTOLIC BLOOD PRESSURE: 122 MMHG | RESPIRATION RATE: 16 BRPM | HEART RATE: 72 BPM | OXYGEN SATURATION: 95 % | BODY MASS INDEX: 23.95 KG/M2 | DIASTOLIC BLOOD PRESSURE: 68 MMHG | HEIGHT: 60 IN

## 2021-01-01 VITALS
SYSTOLIC BLOOD PRESSURE: 123 MMHG | RESPIRATION RATE: 16 BRPM | OXYGEN SATURATION: 98 % | HEART RATE: 90 BPM | HEIGHT: 60 IN | TEMPERATURE: 97.5 F | DIASTOLIC BLOOD PRESSURE: 82 MMHG | BODY MASS INDEX: 22.07 KG/M2 | WEIGHT: 112.4 LBS

## 2021-01-01 VITALS — OXYGEN SATURATION: 95 % | HEART RATE: 92 BPM

## 2021-01-01 DIAGNOSIS — I48.91 ATRIAL FIBRILLATION, UNSPECIFIED TYPE (HCC): ICD-10-CM

## 2021-01-01 DIAGNOSIS — G89.29 CHRONIC LOW BACK PAIN, UNSPECIFIED BACK PAIN LATERALITY, UNSPECIFIED WHETHER SCIATICA PRESENT: ICD-10-CM

## 2021-01-01 DIAGNOSIS — R09.89 SUSPECTED CEREBROVASCULAR ACCIDENT (CVA): ICD-10-CM

## 2021-01-01 DIAGNOSIS — R19.5 HEME POSITIVE STOOL: ICD-10-CM

## 2021-01-01 DIAGNOSIS — R71.0 DROP IN HEMOGLOBIN: ICD-10-CM

## 2021-01-01 DIAGNOSIS — I48.21 PERMANENT ATRIAL FIBRILLATION (HCC): ICD-10-CM

## 2021-01-01 DIAGNOSIS — E78.2 MIXED HYPERLIPIDEMIA: Primary | ICD-10-CM

## 2021-01-01 DIAGNOSIS — R11.2 NAUSEA, VOMITING, AND DIARRHEA: ICD-10-CM

## 2021-01-01 DIAGNOSIS — I21.4 NSTEMI (NON-ST ELEVATED MYOCARDIAL INFARCTION) (HCC): ICD-10-CM

## 2021-01-01 DIAGNOSIS — K52.9 COLITIS, ACUTE: Primary | ICD-10-CM

## 2021-01-01 DIAGNOSIS — R41.82 ALTERED MENTAL STATUS, UNSPECIFIED ALTERED MENTAL STATUS TYPE: ICD-10-CM

## 2021-01-01 DIAGNOSIS — R41.841 COGNITIVE COMMUNICATION DEFICIT: Primary | ICD-10-CM

## 2021-01-01 DIAGNOSIS — M54.50 CHRONIC LOW BACK PAIN, UNSPECIFIED BACK PAIN LATERALITY, UNSPECIFIED WHETHER SCIATICA PRESENT: ICD-10-CM

## 2021-01-01 DIAGNOSIS — R19.7 NAUSEA, VOMITING, AND DIARRHEA: ICD-10-CM

## 2021-01-01 DIAGNOSIS — Z79.01 CHRONIC ANTICOAGULATION: ICD-10-CM

## 2021-01-01 DIAGNOSIS — I25.10 CORONARY ARTERY DISEASE INVOLVING NATIVE CORONARY ARTERY OF NATIVE HEART WITHOUT ANGINA PECTORIS: ICD-10-CM

## 2021-01-01 DIAGNOSIS — R29.898 RIGHT ARM WEAKNESS: ICD-10-CM

## 2021-01-01 DIAGNOSIS — R41.82 ALTERED MENTAL STATUS, UNSPECIFIED ALTERED MENTAL STATUS TYPE: Primary | ICD-10-CM

## 2021-01-01 LAB
ABO GROUP BLD: NORMAL
ABO GROUP BLD: NORMAL
ADV 40+41 DNA STL QL NAA+NON-PROBE: NOT DETECTED
ALBUMIN SERPL-MCNC: 3.3 G/DL (ref 3.5–5.2)
ALBUMIN SERPL-MCNC: 3.4 G/DL (ref 3.5–5.2)
ALBUMIN SERPL-MCNC: 3.9 G/DL (ref 3.5–5.2)
ALBUMIN/GLOB SERPL: 1.2 G/DL
ALBUMIN/GLOB SERPL: 1.4 G/DL
ALBUMIN/GLOB SERPL: 2 G/DL
ALP SERPL-CCNC: 104 U/L (ref 39–117)
ALP SERPL-CCNC: 93 U/L (ref 39–117)
ALP SERPL-CCNC: 96 U/L (ref 39–117)
ALT SERPL W P-5'-P-CCNC: 10 U/L (ref 1–33)
ALT SERPL W P-5'-P-CCNC: 11 U/L (ref 1–33)
ALT SERPL W P-5'-P-CCNC: 6 U/L (ref 1–33)
ALT SERPL W P-5'-P-CCNC: 7 U/L (ref 1–33)
AMPHET+METHAMPHET UR QL: NEGATIVE
AMPHETAMINES UR QL: NEGATIVE
ANION GAP SERPL CALCULATED.3IONS-SCNC: 12 MMOL/L (ref 5–15)
ANION GAP SERPL CALCULATED.3IONS-SCNC: 12 MMOL/L (ref 5–15)
ANION GAP SERPL CALCULATED.3IONS-SCNC: 13 MMOL/L (ref 5–15)
ANION GAP SERPL CALCULATED.3IONS-SCNC: 13 MMOL/L (ref 5–15)
ANION GAP SERPL CALCULATED.3IONS-SCNC: 16 MMOL/L (ref 5–15)
ANION GAP SERPL CALCULATED.3IONS-SCNC: 22 MMOL/L (ref 5–15)
ANION GAP SERPL CALCULATED.3IONS-SCNC: 7 MMOL/L (ref 5–15)
APTT PPP: 32.2 SECONDS (ref 22–39)
APTT PPP: 40.5 SECONDS (ref 22–39)
ARTERIAL PATENCY WRIST A: ABNORMAL
AST SERPL-CCNC: 19 U/L (ref 1–32)
AST SERPL-CCNC: 19 U/L (ref 1–32)
AST SERPL-CCNC: 21 U/L (ref 1–32)
AST SERPL-CCNC: 24 U/L (ref 1–32)
ASTRO TYP 1-8 RNA STL QL NAA+NON-PROBE: NOT DETECTED
ATMOSPHERIC PRESS: ABNORMAL MM[HG]
ATMOSPHERIC PRESS: ABNORMAL MM[HG]
B PARAPERT DNA SPEC QL NAA+PROBE: NOT DETECTED
B PERT DNA SPEC QL NAA+PROBE: NOT DETECTED
BACTERIA SPEC AEROBE CULT: NO GROWTH
BACTERIA SPEC AEROBE CULT: NORMAL
BACTERIA UR QL AUTO: ABNORMAL /HPF
BACTERIA UR QL AUTO: ABNORMAL /HPF
BARBITURATES UR QL SCN: NEGATIVE
BASE EXCESS BLDA CALC-SCNC: -2 MMOL/L (ref -5–5)
BASE EXCESS BLDA CALC-SCNC: -6.7 MMOL/L (ref 0–2)
BASE EXCESS BLDV CALC-SCNC: -10.9 MMOL/L (ref -2–2)
BASOPHILS # BLD AUTO: 0.02 10*3/MM3 (ref 0–0.2)
BASOPHILS # BLD AUTO: 0.03 10*3/MM3 (ref 0–0.2)
BASOPHILS # BLD AUTO: 0.03 10*3/MM3 (ref 0–0.2)
BASOPHILS NFR BLD AUTO: 0.2 % (ref 0–1.5)
BASOPHILS NFR BLD AUTO: 0.3 % (ref 0–1.5)
BASOPHILS NFR BLD AUTO: 0.3 % (ref 0–1.5)
BASOPHILS NFR BLD AUTO: 0.4 % (ref 0–1.5)
BASOPHILS NFR BLD AUTO: 0.5 % (ref 0–1.5)
BDY SITE: ABNORMAL
BDY SITE: ABNORMAL
BENZODIAZ UR QL SCN: NEGATIVE
BH CV ECHO MEAS - AO MAX PG (FULL): 0.3 MMHG
BH CV ECHO MEAS - AO MAX PG: 2.1 MMHG
BH CV ECHO MEAS - AO MEAN PG (FULL): 0.25 MMHG
BH CV ECHO MEAS - AO MEAN PG: 1.1 MMHG
BH CV ECHO MEAS - AO ROOT AREA (BSA CORRECTED): 1.9
BH CV ECHO MEAS - AO ROOT AREA: 6.2 CM^2
BH CV ECHO MEAS - AO ROOT DIAM: 2.8 CM
BH CV ECHO MEAS - AO V2 MAX: 73 CM/SEC
BH CV ECHO MEAS - AO V2 MEAN: 46.8 CM/SEC
BH CV ECHO MEAS - AO V2 VTI: 14.3 CM
BH CV ECHO MEAS - ASC AORTA: 2.9 CM
BH CV ECHO MEAS - AVA(I,A): 2.8 CM^2
BH CV ECHO MEAS - AVA(I,D): 2.8 CM^2
BH CV ECHO MEAS - AVA(V,A): 2.7 CM^2
BH CV ECHO MEAS - AVA(V,D): 2.7 CM^2
BH CV ECHO MEAS - BSA(HAYCOCK): 1.5 M^2
BH CV ECHO MEAS - BSA: 1.5 M^2
BH CV ECHO MEAS - BZI_BMI: 23 KILOGRAMS/M^2
BH CV ECHO MEAS - BZI_METRIC_HEIGHT: 152.4 CM
BH CV ECHO MEAS - BZI_METRIC_WEIGHT: 53.5 KG
BH CV ECHO MEAS - EDV(CUBED): 96.6 ML
BH CV ECHO MEAS - EDV(MOD-SP4): 47 ML
BH CV ECHO MEAS - EDV(TEICH): 96.8 ML
BH CV ECHO MEAS - EF(CUBED): 53.6 %
BH CV ECHO MEAS - EF(MOD-SP4): 61.7 %
BH CV ECHO MEAS - EF(TEICH): 45.5 %
BH CV ECHO MEAS - ESV(CUBED): 44.9 ML
BH CV ECHO MEAS - ESV(MOD-SP4): 18 ML
BH CV ECHO MEAS - ESV(TEICH): 52.8 ML
BH CV ECHO MEAS - FS: 22.6 %
BH CV ECHO MEAS - IVS/LVPW: 1.1
BH CV ECHO MEAS - IVSD: 1.2 CM
BH CV ECHO MEAS - LA DIMENSION: 4.5 CM
BH CV ECHO MEAS - LA/AO: 1.6
BH CV ECHO MEAS - LAD MAJOR: 6.5 CM
BH CV ECHO MEAS - LAT PEAK E' VEL: 6.7 CM/SEC
BH CV ECHO MEAS - LV DIASTOLIC VOL/BSA (35-75): 31.5 ML/M^2
BH CV ECHO MEAS - LV MASS(C)D: 189.2 GRAMS
BH CV ECHO MEAS - LV MASS(C)DI: 126.8 GRAMS/M^2
BH CV ECHO MEAS - LV MAX PG: 1.8 MMHG
BH CV ECHO MEAS - LV MEAN PG: 0.82 MMHG
BH CV ECHO MEAS - LV SYSTOLIC VOL/BSA (12-30): 12.1 ML/M^2
BH CV ECHO MEAS - LV V1 MAX: 67.6 CM/SEC
BH CV ECHO MEAS - LV V1 MEAN: 41.3 CM/SEC
BH CV ECHO MEAS - LV V1 VTI: 13.9 CM
BH CV ECHO MEAS - LVIDD: 4.6 CM
BH CV ECHO MEAS - LVIDS: 3.6 CM
BH CV ECHO MEAS - LVLD AP4: 5.3 CM
BH CV ECHO MEAS - LVLS AP4: 4.6 CM
BH CV ECHO MEAS - LVOT AREA (M): 2.8 CM^2
BH CV ECHO MEAS - LVOT AREA: 2.9 CM^2
BH CV ECHO MEAS - LVOT DIAM: 1.9 CM
BH CV ECHO MEAS - LVPWD: 1.1 CM
BH CV ECHO MEAS - MED PEAK E' VEL: 7.8 CM/SEC
BH CV ECHO MEAS - PA ACC SLOPE: 513.2 CM/SEC^2
BH CV ECHO MEAS - PA ACC TIME: 0.09 SEC
BH CV ECHO MEAS - PA MAX PG: 1.3 MMHG
BH CV ECHO MEAS - PA PR(ACCEL): 39.4 MMHG
BH CV ECHO MEAS - PA V2 MAX: 57 CM/SEC
BH CV ECHO MEAS - PI END-D VEL: 92.3 CM/SEC
BH CV ECHO MEAS - RAP SYSTOLE: 3 MMHG
BH CV ECHO MEAS - RVSP: 30 MMHG
BH CV ECHO MEAS - SI(AO): 59.8 ML/M^2
BH CV ECHO MEAS - SI(CUBED): 34.7 ML/M^2
BH CV ECHO MEAS - SI(LVOT): 27.4 ML/M^2
BH CV ECHO MEAS - SI(MOD-SP4): 19.4 ML/M^2
BH CV ECHO MEAS - SI(TEICH): 29.5 ML/M^2
BH CV ECHO MEAS - SV(AO): 89.2 ML
BH CV ECHO MEAS - SV(CUBED): 51.7 ML
BH CV ECHO MEAS - SV(LVOT): 40.9 ML
BH CV ECHO MEAS - SV(MOD-SP4): 29 ML
BH CV ECHO MEAS - SV(TEICH): 44 ML
BH CV ECHO MEAS - TAPSE (>1.6): 1.3 CM
BH CV ECHO MEAS - TR MAX PG: 27 MMHG
BH CV ECHO MEAS - TR MAX VEL: 245 CM/SEC
BH CV XLRA - RV BASE: 3.5 CM
BH CV XLRA - RV LENGTH: 5 CM
BH CV XLRA - RV MID: 2.8 CM
BILIRUB SERPL-MCNC: 0.4 MG/DL (ref 0–1.2)
BILIRUB SERPL-MCNC: 0.4 MG/DL (ref 0–1.2)
BILIRUB SERPL-MCNC: 0.5 MG/DL (ref 0–1.2)
BILIRUB UR QL STRIP: NEGATIVE
BLD GP AB SCN SERPL QL: NEGATIVE
BODY TEMPERATURE: 37 C
BODY TEMPERATURE: 37 C
BUN SERPL-MCNC: 12 MG/DL (ref 8–23)
BUN SERPL-MCNC: 12 MG/DL (ref 8–23)
BUN SERPL-MCNC: 13 MG/DL (ref 8–23)
BUN SERPL-MCNC: 28 MG/DL (ref 8–23)
BUN SERPL-MCNC: 34 MG/DL (ref 8–23)
BUN/CREAT SERPL: 14.3 (ref 7–25)
BUN/CREAT SERPL: 14.8 (ref 7–25)
BUN/CREAT SERPL: 17.3 (ref 7–25)
BUN/CREAT SERPL: 23.7 (ref 7–25)
BUN/CREAT SERPL: 30.1 (ref 7–25)
BUPRENORPHINE SERPL-MCNC: NEGATIVE NG/ML
C CAYETANENSIS DNA STL QL NAA+NON-PROBE: NOT DETECTED
C COLI+JEJ+UPSA DNA STL QL NAA+NON-PROBE: NOT DETECTED
C DIFF TOX GENS STL QL NAA+PROBE: DETECTED
C PNEUM DNA NPH QL NAA+NON-PROBE: NOT DETECTED
CA-I BLDA-SCNC: 1.27 MMOL/L (ref 1.2–1.32)
CALCIUM SPEC-SCNC: 7.8 MG/DL (ref 8.2–9.6)
CALCIUM SPEC-SCNC: 8.5 MG/DL (ref 8.2–9.6)
CALCIUM SPEC-SCNC: 8.6 MG/DL (ref 8.2–9.6)
CALCIUM SPEC-SCNC: 9.1 MG/DL (ref 8.2–9.6)
CALCIUM SPEC-SCNC: 9.1 MG/DL (ref 8.2–9.6)
CALCIUM SPEC-SCNC: 9.2 MG/DL (ref 8.2–9.6)
CALCIUM SPEC-SCNC: 9.3 MG/DL (ref 8.2–9.6)
CANNABINOIDS SERPL QL: NEGATIVE
CHLORIDE SERPL-SCNC: 100 MMOL/L (ref 98–107)
CHLORIDE SERPL-SCNC: 104 MMOL/L (ref 98–107)
CHLORIDE SERPL-SCNC: 104 MMOL/L (ref 98–107)
CHLORIDE SERPL-SCNC: 106 MMOL/L (ref 98–107)
CHLORIDE SERPL-SCNC: 107 MMOL/L (ref 98–107)
CHOLEST SERPL-MCNC: 145 MG/DL (ref 0–200)
CLARITY UR: ABNORMAL
CLARITY UR: CLEAR
CLARITY UR: CLEAR
CO2 BLDA-SCNC: 17 MMOL/L (ref 22–33)
CO2 BLDA-SCNC: 17.5 MMOL/L (ref 22–33)
CO2 BLDA-SCNC: 24 MMOL/L (ref 24–29)
CO2 SERPL-SCNC: 15 MMOL/L (ref 22–29)
CO2 SERPL-SCNC: 16 MMOL/L (ref 22–29)
CO2 SERPL-SCNC: 16 MMOL/L (ref 22–29)
CO2 SERPL-SCNC: 20 MMOL/L (ref 22–29)
CO2 SERPL-SCNC: 21 MMOL/L (ref 22–29)
CO2 SERPL-SCNC: 22 MMOL/L (ref 22–29)
CO2 SERPL-SCNC: 23 MMOL/L (ref 22–29)
COARSE GRAN CASTS URNS QL MICRO: ABNORMAL /LPF
COCAINE UR QL: NEGATIVE
COHGB MFR BLD: 0.9 % (ref 0–2)
COHGB MFR BLD: 1.1 %
COLOR UR: ABNORMAL
COLOR UR: YELLOW
COLOR UR: YELLOW
CREAT BLDA-MCNC: 1.1 MG/DL (ref 0.6–1.3)
CREAT SERPL-MCNC: 0.75 MG/DL (ref 0.57–1)
CREAT SERPL-MCNC: 0.81 MG/DL (ref 0.57–1)
CREAT SERPL-MCNC: 0.81 MG/DL (ref 0.57–1)
CREAT SERPL-MCNC: 0.88 MG/DL (ref 0.57–1)
CREAT SERPL-MCNC: 0.91 MG/DL (ref 0.57–1)
CREAT SERPL-MCNC: 1.13 MG/DL (ref 0.57–1)
CREAT SERPL-MCNC: 1.18 MG/DL (ref 0.57–1)
CRYPTOSP DNA STL QL NAA+NON-PROBE: NOT DETECTED
D-LACTATE SERPL-SCNC: 1.7 MMOL/L (ref 0.5–2)
D-LACTATE SERPL-SCNC: 1.8 MMOL/L (ref 0.5–2)
D-LACTATE SERPL-SCNC: 2.7 MMOL/L (ref 0.5–2)
DEPRECATED RDW RBC AUTO: 49.6 FL (ref 37–54)
DEPRECATED RDW RBC AUTO: 51.2 FL (ref 37–54)
DEPRECATED RDW RBC AUTO: 51.3 FL (ref 37–54)
DEPRECATED RDW RBC AUTO: 51.8 FL (ref 37–54)
DEPRECATED RDW RBC AUTO: 52.8 FL (ref 37–54)
DEPRECATED RDW RBC AUTO: 53.8 FL (ref 37–54)
DEPRECATED RDW RBC AUTO: 53.9 FL (ref 37–54)
DEVELOPER EXPIRATION DATE: ABNORMAL
DEVELOPER LOT NUMBER: ABNORMAL
E HISTOLYT DNA STL QL NAA+NON-PROBE: NOT DETECTED
EAEC PAA PLAS AGGR+AATA ST NAA+NON-PRB: NOT DETECTED
EC STX1+STX2 GENES STL QL NAA+NON-PROBE: NOT DETECTED
EOSINOPHIL # BLD AUTO: 0.03 10*3/MM3 (ref 0–0.4)
EOSINOPHIL # BLD AUTO: 0.05 10*3/MM3 (ref 0–0.4)
EOSINOPHIL # BLD AUTO: 0.19 10*3/MM3 (ref 0–0.4)
EOSINOPHIL # BLD AUTO: 0.22 10*3/MM3 (ref 0–0.4)
EOSINOPHIL # BLD AUTO: 0.24 10*3/MM3 (ref 0–0.4)
EOSINOPHIL NFR BLD AUTO: 0.4 % (ref 0.3–6.2)
EOSINOPHIL NFR BLD AUTO: 0.5 % (ref 0.3–6.2)
EOSINOPHIL NFR BLD AUTO: 3.7 % (ref 0.3–6.2)
EOSINOPHIL NFR BLD AUTO: 3.8 % (ref 0.3–6.2)
EOSINOPHIL NFR BLD AUTO: 3.9 % (ref 0.3–6.2)
EPAP: 0
EPAP: 0
EPEC EAE GENE STL QL NAA+NON-PROBE: NOT DETECTED
ERYTHROCYTE [DISTWIDTH] IN BLOOD BY AUTOMATED COUNT: 13.1 % (ref 12.3–15.4)
ERYTHROCYTE [DISTWIDTH] IN BLOOD BY AUTOMATED COUNT: 13.2 % (ref 12.3–15.4)
ERYTHROCYTE [DISTWIDTH] IN BLOOD BY AUTOMATED COUNT: 13.4 % (ref 12.3–15.4)
ERYTHROCYTE [DISTWIDTH] IN BLOOD BY AUTOMATED COUNT: 13.6 % (ref 12.3–15.4)
ERYTHROCYTE [DISTWIDTH] IN BLOOD BY AUTOMATED COUNT: 13.7 % (ref 12.3–15.4)
ETEC LTA+ST1A+ST1B TOX ST NAA+NON-PROBE: NOT DETECTED
ETHANOL BLD-MCNC: 16 MG/DL (ref 0–10)
EXPIRATION DATE: ABNORMAL
FECAL OCCULT BLOOD SCREEN, POC: POSITIVE
FLUAV SUBTYP SPEC NAA+PROBE: NOT DETECTED
FLUBV RNA ISLT QL NAA+PROBE: NOT DETECTED
G LAMBLIA DNA STL QL NAA+NON-PROBE: NOT DETECTED
GFR SERPL CREATININE-BSD FRML MDRD: 43 ML/MIN/1.73
GFR SERPL CREATININE-BSD FRML MDRD: 45 ML/MIN/1.73
GFR SERPL CREATININE-BSD FRML MDRD: 58 ML/MIN/1.73
GFR SERPL CREATININE-BSD FRML MDRD: 60 ML/MIN/1.73
GFR SERPL CREATININE-BSD FRML MDRD: 66 ML/MIN/1.73
GFR SERPL CREATININE-BSD FRML MDRD: 66 ML/MIN/1.73
GFR SERPL CREATININE-BSD FRML MDRD: 72 ML/MIN/1.73
GLOBULIN UR ELPH-MCNC: 2 GM/DL
GLOBULIN UR ELPH-MCNC: 2.3 GM/DL
GLOBULIN UR ELPH-MCNC: 2.9 GM/DL
GLUCOSE BLDC GLUCOMTR-MCNC: 102 MG/DL (ref 70–130)
GLUCOSE BLDC GLUCOMTR-MCNC: 107 MG/DL (ref 70–130)
GLUCOSE BLDC GLUCOMTR-MCNC: 110 MG/DL (ref 70–130)
GLUCOSE BLDC GLUCOMTR-MCNC: 132 MG/DL (ref 70–130)
GLUCOSE BLDC GLUCOMTR-MCNC: 210 MG/DL (ref 70–130)
GLUCOSE BLDC GLUCOMTR-MCNC: 89 MG/DL (ref 70–130)
GLUCOSE BLDC GLUCOMTR-MCNC: 98 MG/DL (ref 70–130)
GLUCOSE SERPL-MCNC: 110 MG/DL (ref 65–99)
GLUCOSE SERPL-MCNC: 111 MG/DL (ref 65–99)
GLUCOSE SERPL-MCNC: 112 MG/DL (ref 65–99)
GLUCOSE SERPL-MCNC: 113 MG/DL (ref 65–99)
GLUCOSE SERPL-MCNC: 83 MG/DL (ref 65–99)
GLUCOSE SERPL-MCNC: 97 MG/DL (ref 65–99)
GLUCOSE SERPL-MCNC: 97 MG/DL (ref 65–99)
GLUCOSE UR STRIP-MCNC: NEGATIVE MG/DL
HADV DNA SPEC NAA+PROBE: NOT DETECTED
HCO3 BLDA-SCNC: 16.3 MMOL/L (ref 20–26)
HCO3 BLDA-SCNC: 23.1 MMOL/L (ref 22–26)
HCO3 BLDV-SCNC: 16.3 MMOL/L (ref 22–28)
HCOV 229E RNA SPEC QL NAA+PROBE: NOT DETECTED
HCOV HKU1 RNA SPEC QL NAA+PROBE: NOT DETECTED
HCOV NL63 RNA SPEC QL NAA+PROBE: NOT DETECTED
HCOV OC43 RNA SPEC QL NAA+PROBE: NOT DETECTED
HCT VFR BLD AUTO: 28.2 % (ref 34–46.6)
HCT VFR BLD AUTO: 32.7 % (ref 34–46.6)
HCT VFR BLD AUTO: 36.6 % (ref 34–46.6)
HCT VFR BLD AUTO: 38.9 % (ref 34–46.6)
HCT VFR BLD AUTO: 39.7 % (ref 34–46.6)
HCT VFR BLD AUTO: 40 % (ref 34–46.6)
HCT VFR BLD AUTO: 41.9 % (ref 34–46.6)
HCT VFR BLD CALC: 24.8 % (ref 38–51)
HCT VFR BLDA CALC: 40 % (ref 38–51)
HDLC SERPL-MCNC: 39 MG/DL (ref 40–60)
HGB BLD-MCNC: 10.3 G/DL (ref 12–15.9)
HGB BLD-MCNC: 11.9 G/DL (ref 12–15.9)
HGB BLD-MCNC: 12.3 G/DL (ref 12–15.9)
HGB BLD-MCNC: 12.4 G/DL (ref 12–15.9)
HGB BLD-MCNC: 12.6 G/DL (ref 12–15.9)
HGB BLD-MCNC: 12.9 G/DL (ref 12–15.9)
HGB BLD-MCNC: 8.6 G/DL (ref 12–15.9)
HGB BLD-MCNC: 8.7 G/DL (ref 12–15.9)
HGB BLD-MCNC: 8.8 G/DL (ref 12–15.9)
HGB BLDA-MCNC: 13.6 G/DL (ref 12–17)
HGB BLDA-MCNC: 8.1 G/DL (ref 14–18)
HGB BLDA-MCNC: 9.1 G/DL (ref 14–18)
HGB UR QL STRIP.AUTO: ABNORMAL
HGB UR QL STRIP.AUTO: NEGATIVE
HGB UR QL STRIP.AUTO: NEGATIVE
HMPV RNA NPH QL NAA+NON-PROBE: NOT DETECTED
HOLD SPECIMEN: NORMAL
HPIV1 RNA ISLT QL NAA+PROBE: NOT DETECTED
HPIV2 RNA SPEC QL NAA+PROBE: NOT DETECTED
HPIV3 RNA NPH QL NAA+PROBE: NOT DETECTED
HPIV4 P GENE NPH QL NAA+PROBE: NOT DETECTED
HYALINE CASTS UR QL AUTO: ABNORMAL /LPF
HYALINE CASTS UR QL AUTO: ABNORMAL /LPF
IMM GRANULOCYTES # BLD AUTO: 0.01 10*3/MM3 (ref 0–0.05)
IMM GRANULOCYTES # BLD AUTO: 0.02 10*3/MM3 (ref 0–0.05)
IMM GRANULOCYTES # BLD AUTO: 0.03 10*3/MM3 (ref 0–0.05)
IMM GRANULOCYTES # BLD AUTO: 0.04 10*3/MM3 (ref 0–0.05)
IMM GRANULOCYTES # BLD AUTO: 0.04 10*3/MM3 (ref 0–0.05)
IMM GRANULOCYTES NFR BLD AUTO: 0.2 % (ref 0–0.5)
IMM GRANULOCYTES NFR BLD AUTO: 0.4 % (ref 0–0.5)
IMM GRANULOCYTES NFR BLD AUTO: 0.4 % (ref 0–0.5)
IMM GRANULOCYTES NFR BLD AUTO: 0.5 % (ref 0–0.5)
IMM GRANULOCYTES NFR BLD AUTO: 0.5 % (ref 0–0.5)
INHALED O2 CONCENTRATION: 21 %
INHALED O2 CONCENTRATION: 21 %
INR PPP: 1.16 (ref 0.85–1.16)
INR PPP: 3.4 (ref 0.8–1.2)
IPAP: 0
IPAP: 0
KETONES UR QL STRIP: ABNORMAL
KETONES UR QL STRIP: NEGATIVE
KETONES UR QL STRIP: NEGATIVE
LDLC SERPL CALC-MCNC: 84 MG/DL (ref 0–100)
LDLC/HDLC SERPL: 2.1 {RATIO}
LEFT ATRIUM VOLUME INDEX: 48.3 ML/M^2
LEFT ATRIUM VOLUME: 72 ML
LEUKOCYTE ESTERASE UR QL STRIP.AUTO: ABNORMAL
LEUKOCYTE ESTERASE UR QL STRIP.AUTO: ABNORMAL
LEUKOCYTE ESTERASE UR QL STRIP.AUTO: NEGATIVE
LIPASE SERPL-CCNC: 9 U/L (ref 13–60)
LV EF 2D ECHO EST: 60 %
LYMPHOCYTES # BLD AUTO: 0.79 10*3/MM3 (ref 0.7–3.1)
LYMPHOCYTES # BLD AUTO: 0.8 10*3/MM3 (ref 0.7–3.1)
LYMPHOCYTES # BLD AUTO: 1.39 10*3/MM3 (ref 0.7–3.1)
LYMPHOCYTES # BLD AUTO: 1.41 10*3/MM3 (ref 0.7–3.1)
LYMPHOCYTES # BLD AUTO: 1.75 10*3/MM3 (ref 0.7–3.1)
LYMPHOCYTES NFR BLD AUTO: 21.8 % (ref 19.6–45.3)
LYMPHOCYTES NFR BLD AUTO: 27.3 % (ref 19.6–45.3)
LYMPHOCYTES NFR BLD AUTO: 31.3 % (ref 19.6–45.3)
LYMPHOCYTES NFR BLD AUTO: 7.8 % (ref 19.6–45.3)
LYMPHOCYTES NFR BLD AUTO: 9.6 % (ref 19.6–45.3)
Lab: ABNORMAL
Lab: ABNORMAL
M PNEUMO IGG SER IA-ACNC: NOT DETECTED
MAGNESIUM SERPL-MCNC: 2 MG/DL (ref 1.7–2.3)
MAGNESIUM SERPL-MCNC: 2.1 MG/DL (ref 1.7–2.3)
MAGNESIUM SERPL-MCNC: 2.2 MG/DL (ref 1.7–2.3)
MAXIMAL PREDICTED HEART RATE: 128 BPM
MCH RBC QN AUTO: 32.9 PG (ref 26.6–33)
MCH RBC QN AUTO: 33.3 PG (ref 26.6–33)
MCH RBC QN AUTO: 33.3 PG (ref 26.6–33)
MCH RBC QN AUTO: 33.4 PG (ref 26.6–33)
MCH RBC QN AUTO: 33.6 PG (ref 26.6–33)
MCH RBC QN AUTO: 33.7 PG (ref 26.6–33)
MCH RBC QN AUTO: 34.1 PG (ref 26.6–33)
MCHC RBC AUTO-ENTMCNC: 30.1 G/DL (ref 31.5–35.7)
MCHC RBC AUTO-ENTMCNC: 30.8 G/DL (ref 31.5–35.7)
MCHC RBC AUTO-ENTMCNC: 30.9 G/DL (ref 31.5–35.7)
MCHC RBC AUTO-ENTMCNC: 31.5 G/DL (ref 31.5–35.7)
MCHC RBC AUTO-ENTMCNC: 31.9 G/DL (ref 31.5–35.7)
MCHC RBC AUTO-ENTMCNC: 32.5 G/DL (ref 31.5–35.7)
MCHC RBC AUTO-ENTMCNC: 32.5 G/DL (ref 31.5–35.7)
MCV RBC AUTO: 102.8 FL (ref 79–97)
MCV RBC AUTO: 104.5 FL (ref 79–97)
MCV RBC AUTO: 104.9 FL (ref 79–97)
MCV RBC AUTO: 105.4 FL (ref 79–97)
MCV RBC AUTO: 108 FL (ref 79–97)
MCV RBC AUTO: 108.4 FL (ref 79–97)
MCV RBC AUTO: 112 FL (ref 79–97)
METHADONE UR QL SCN: NEGATIVE
METHGB BLD QL: 0.6 %
METHGB BLD QL: 0.6 % (ref 0–1.5)
MODALITY: ABNORMAL
MODALITY: ABNORMAL
MONOCYTES # BLD AUTO: 0.42 10*3/MM3 (ref 0.1–0.9)
MONOCYTES # BLD AUTO: 0.51 10*3/MM3 (ref 0.1–0.9)
MONOCYTES # BLD AUTO: 0.53 10*3/MM3 (ref 0.1–0.9)
MONOCYTES # BLD AUTO: 0.85 10*3/MM3 (ref 0.1–0.9)
MONOCYTES # BLD AUTO: 1.05 10*3/MM3 (ref 0.1–0.9)
MONOCYTES NFR BLD AUTO: 12.6 % (ref 5–12)
MONOCYTES NFR BLD AUTO: 8 % (ref 5–12)
MONOCYTES NFR BLD AUTO: 8.1 % (ref 5–12)
MONOCYTES NFR BLD AUTO: 8.4 % (ref 5–12)
MONOCYTES NFR BLD AUTO: 9.5 % (ref 5–12)
NEGATIVE CONTROL: NEGATIVE
NEUTROPHILS NFR BLD AUTO: 3.05 10*3/MM3 (ref 1.7–7)
NEUTROPHILS NFR BLD AUTO: 3.12 10*3/MM3 (ref 1.7–7)
NEUTROPHILS NFR BLD AUTO: 4.2 10*3/MM3 (ref 1.7–7)
NEUTROPHILS NFR BLD AUTO: 54.4 % (ref 42.7–76)
NEUTROPHILS NFR BLD AUTO: 6.37 10*3/MM3 (ref 1.7–7)
NEUTROPHILS NFR BLD AUTO: 60.3 % (ref 42.7–76)
NEUTROPHILS NFR BLD AUTO: 65.6 % (ref 42.7–76)
NEUTROPHILS NFR BLD AUTO: 76.7 % (ref 42.7–76)
NEUTROPHILS NFR BLD AUTO: 8.38 10*3/MM3 (ref 1.7–7)
NEUTROPHILS NFR BLD AUTO: 82.6 % (ref 42.7–76)
NITRITE UR QL STRIP: NEGATIVE
NOROVIRUS GI+II RNA STL QL NAA+NON-PROBE: NOT DETECTED
NOTE: ABNORMAL
NOTE: ABNORMAL
NOTIFIED BY: ABNORMAL
NOTIFIED WHO: ABNORMAL
NRBC BLD AUTO-RTO: 0 /100 WBC (ref 0–0.2)
OPIATES UR QL: NEGATIVE
OXYCODONE UR QL SCN: NEGATIVE
OXYHGB MFR BLDV: 46.2 %
OXYHGB MFR BLDV: 97.6 % (ref 94–99)
P SHIGELLOIDES DNA STL QL NAA+NON-PROBE: NOT DETECTED
PAW @ PEAK INSP FLOW SETTING VENT: 0 CMH2O
PAW @ PEAK INSP FLOW SETTING VENT: 0 CMH2O
PCO2 BLDA: 23.6 MM HG (ref 35–45)
PCO2 BLDA: 37.8 MM HG (ref 35–45)
PCO2 BLDV: 40.7 MM HG (ref 41–51)
PCO2 TEMP ADJ BLD: 23.6 MM HG (ref 35–45)
PCP UR QL SCN: NEGATIVE
PH BLDA: 7.4 PH UNITS (ref 7.35–7.6)
PH BLDA: 7.45 PH UNITS (ref 7.35–7.45)
PH BLDV: 7.21 PH UNITS (ref 7.31–7.41)
PH UR STRIP.AUTO: 5.5 [PH] (ref 5–8)
PH UR STRIP.AUTO: <=5 [PH] (ref 5–8)
PH UR STRIP.AUTO: <=5 [PH] (ref 5–8)
PH, TEMP CORRECTED: 7.45 PH UNITS
PLATELET # BLD AUTO: 112 10*3/MM3 (ref 140–450)
PLATELET # BLD AUTO: 118 10*3/MM3 (ref 140–450)
PLATELET # BLD AUTO: 118 10*3/MM3 (ref 140–450)
PLATELET # BLD AUTO: 128 10*3/MM3 (ref 140–450)
PLATELET # BLD AUTO: 138 10*3/MM3 (ref 140–450)
PLATELET # BLD AUTO: 207 10*3/MM3 (ref 140–450)
PLATELET # BLD AUTO: 221 10*3/MM3 (ref 140–450)
PMV BLD AUTO: 10.1 FL (ref 6–12)
PMV BLD AUTO: 10.1 FL (ref 6–12)
PMV BLD AUTO: 10.2 FL (ref 6–12)
PMV BLD AUTO: 10.2 FL (ref 6–12)
PMV BLD AUTO: 10.3 FL (ref 6–12)
PMV BLD AUTO: 10.3 FL (ref 6–12)
PMV BLD AUTO: 9.9 FL (ref 6–12)
PO2 BLDA: 108 MM HG (ref 83–108)
PO2 BLDA: 27 MMHG (ref 80–105)
PO2 BLDV: 22.6 MM HG (ref 27–53)
PO2 TEMP ADJ BLD: 108 MM HG (ref 83–108)
POSITIVE CONTROL: POSITIVE
POTASSIUM BLDA-SCNC: 4 MMOL/L (ref 3.5–4.9)
POTASSIUM SERPL-SCNC: 3.5 MMOL/L (ref 3.5–5.2)
POTASSIUM SERPL-SCNC: 3.7 MMOL/L (ref 3.5–5.2)
POTASSIUM SERPL-SCNC: 3.8 MMOL/L (ref 3.5–5.2)
POTASSIUM SERPL-SCNC: 3.8 MMOL/L (ref 3.5–5.2)
POTASSIUM SERPL-SCNC: 4.3 MMOL/L (ref 3.5–5.2)
PROCALCITONIN SERPL-MCNC: 0.29 NG/ML (ref 0–0.25)
PROPOXYPH UR QL: NEGATIVE
PROT SERPL-MCNC: 5.6 G/DL (ref 6–8.5)
PROT SERPL-MCNC: 5.9 G/DL (ref 6–8.5)
PROT SERPL-MCNC: 6.3 G/DL (ref 6–8.5)
PROT UR QL STRIP: ABNORMAL
PROT UR QL STRIP: NEGATIVE
PROT UR QL STRIP: NEGATIVE
PROTHROMBIN TIME: 15 SECONDS (ref 11.4–14.4)
PROTHROMBIN TIME: 38.2 SECONDS (ref 12.8–15.2)
QT INTERVAL: 330 MS
QT INTERVAL: 374 MS
QT INTERVAL: 378 MS
QTC INTERVAL: 444 MS
QTC INTERVAL: 445 MS
QTC INTERVAL: 456 MS
RBC # BLD AUTO: 2.61 10*6/MM3 (ref 3.77–5.28)
RBC # BLD AUTO: 3.13 10*6/MM3 (ref 3.77–5.28)
RBC # BLD AUTO: 3.49 10*6/MM3 (ref 3.77–5.28)
RBC # BLD AUTO: 3.69 10*6/MM3 (ref 3.77–5.28)
RBC # BLD AUTO: 3.69 10*6/MM3 (ref 3.77–5.28)
RBC # BLD AUTO: 3.74 10*6/MM3 (ref 3.77–5.28)
RBC # BLD AUTO: 3.86 10*6/MM3 (ref 3.77–5.28)
RBC # UR STRIP: ABNORMAL /HPF
RBC # UR: ABNORMAL /HPF
REF LAB TEST METHOD: ABNORMAL
REF LAB TEST METHOD: ABNORMAL
RH BLD: POSITIVE
RH BLD: POSITIVE
RHINOVIRUS RNA SPEC NAA+PROBE: NOT DETECTED
RSV RNA NPH QL NAA+NON-PROBE: NOT DETECTED
RVA RNA STL QL NAA+NON-PROBE: NOT DETECTED
S ENT+BONG DNA STL QL NAA+NON-PROBE: NOT DETECTED
SAO2 % BLDA: 51 % (ref 95–98)
SAPO I+II+IV+V RNA STL QL NAA+NON-PROBE: NOT DETECTED
SARS-COV-2 RNA NPH QL NAA+NON-PROBE: NOT DETECTED
SARS-COV-2 RNA RESP QL NAA+PROBE: NOT DETECTED
SHIGELLA SP+EIEC IPAH ST NAA+NON-PROBE: NOT DETECTED
SODIUM BLD-SCNC: 143 MMOL/L (ref 138–146)
SODIUM SERPL-SCNC: 135 MMOL/L (ref 136–145)
SODIUM SERPL-SCNC: 137 MMOL/L (ref 136–145)
SODIUM SERPL-SCNC: 138 MMOL/L (ref 136–145)
SODIUM SERPL-SCNC: 139 MMOL/L (ref 136–145)
SODIUM SERPL-SCNC: 140 MMOL/L (ref 136–145)
SP GR UR STRIP: 1.02 (ref 1–1.03)
SP GR UR STRIP: 1.02 (ref 1–1.03)
SP GR UR STRIP: 1.03 (ref 1–1.03)
SQUAMOUS #/AREA URNS HPF: ABNORMAL /HPF
SQUAMOUS #/AREA URNS HPF: ABNORMAL /HPF
STRESS TARGET HR: 109 BPM
T&S EXPIRATION DATE: NORMAL
TOTAL RATE: 0 BREATHS/MINUTE
TOTAL RATE: 0 BREATHS/MINUTE
TRICYCLICS UR QL SCN: NEGATIVE
TRIGL SERPL-MCNC: 121 MG/DL (ref 0–150)
TROPONIN T SERPL-MCNC: <0.01 NG/ML (ref 0–0.03)
TROPONIN T SERPL-MCNC: <0.01 NG/ML (ref 0–0.03)
TSH SERPL DL<=0.05 MIU/L-ACNC: 1.54 UIU/ML (ref 0.27–4.2)
UROBILINOGEN UR QL STRIP: ABNORMAL
UROBILINOGEN UR QL STRIP: ABNORMAL
UROBILINOGEN UR QL STRIP: NORMAL
V CHOL+PARA+VUL DNA STL QL NAA+NON-PROBE: NOT DETECTED
V CHOLERAE DNA STL QL NAA+NON-PROBE: NOT DETECTED
VLDLC SERPL-MCNC: 22 MG/DL (ref 5–40)
WBC # BLD AUTO: 4.61 10*3/MM3 (ref 3.4–10.8)
WBC # BLD AUTO: 5.17 10*3/MM3 (ref 3.4–10.8)
WBC # BLD AUTO: 5.6 10*3/MM3 (ref 3.4–10.8)
WBC # BLD AUTO: 5.61 10*3/MM3 (ref 3.4–10.8)
WBC # BLD AUTO: 6.39 10*3/MM3 (ref 3.4–10.8)
WBC # UR STRIP: ABNORMAL /HPF
WBC NRBC COR # BLD: 10.14 10*3/MM3 (ref 3.4–10.8)
WBC NRBC COR # BLD: 8.31 10*3/MM3 (ref 3.4–10.8)
WBC UR QL AUTO: ABNORMAL /HPF
WHOLE BLOOD HOLD SPECIMEN: NORMAL
WHOLE BLOOD HOLD SPECIMEN: NORMAL
Y ENTEROCOL DNA STL QL NAA+NON-PROBE: NOT DETECTED

## 2021-01-01 PROCEDURE — 82962 GLUCOSE BLOOD TEST: CPT

## 2021-01-01 PROCEDURE — 0042T HC CT CEREBRAL PERFUSION W/WO CONTRAST: CPT

## 2021-01-01 PROCEDURE — 80048 BASIC METABOLIC PNL TOTAL CA: CPT | Performed by: NURSE PRACTITIONER

## 2021-01-01 PROCEDURE — 99223 1ST HOSP IP/OBS HIGH 75: CPT | Performed by: INTERNAL MEDICINE

## 2021-01-01 PROCEDURE — 97161 PT EVAL LOW COMPLEX 20 MIN: CPT

## 2021-01-01 PROCEDURE — 4A03X5D MEASUREMENT OF ARTERIAL FLOW, INTRACRANIAL, EXTERNAL APPROACH: ICD-10-PCS | Performed by: RADIOLOGY

## 2021-01-01 PROCEDURE — 25010000002 MORPHINE PER 10 MG: Performed by: NURSE PRACTITIONER

## 2021-01-01 PROCEDURE — 25010000002 CEFTRIAXONE PER 250 MG: Performed by: INTERNAL MEDICINE

## 2021-01-01 PROCEDURE — 84450 TRANSFERASE (AST) (SGOT): CPT

## 2021-01-01 PROCEDURE — 99231 SBSQ HOSP IP/OBS SF/LOW 25: CPT | Performed by: NURSE PRACTITIONER

## 2021-01-01 PROCEDURE — 84484 ASSAY OF TROPONIN QUANT: CPT

## 2021-01-01 PROCEDURE — 99239 HOSP IP/OBS DSCHRG MGMT >30: CPT | Performed by: FAMILY MEDICINE

## 2021-01-01 PROCEDURE — G0152 HHCP-SERV OF OT,EA 15 MIN: HCPCS

## 2021-01-01 PROCEDURE — 81003 URINALYSIS AUTO W/O SCOPE: CPT | Performed by: EMERGENCY MEDICINE

## 2021-01-01 PROCEDURE — 80306 DRUG TEST PRSMV INSTRMNT: CPT | Performed by: EMERGENCY MEDICINE

## 2021-01-01 PROCEDURE — 83605 ASSAY OF LACTIC ACID: CPT | Performed by: INTERNAL MEDICINE

## 2021-01-01 PROCEDURE — 82803 BLOOD GASES ANY COMBINATION: CPT

## 2021-01-01 PROCEDURE — 93306 TTE W/DOPPLER COMPLETE: CPT

## 2021-01-01 PROCEDURE — G0378 HOSPITAL OBSERVATION PER HR: HCPCS

## 2021-01-01 PROCEDURE — 86900 BLOOD TYPING SEROLOGIC ABO: CPT

## 2021-01-01 PROCEDURE — 87493 C DIFF AMPLIFIED PROBE: CPT | Performed by: EMERGENCY MEDICINE

## 2021-01-01 PROCEDURE — 70450 CT HEAD/BRAIN W/O DYE: CPT

## 2021-01-01 PROCEDURE — 93005 ELECTROCARDIOGRAM TRACING: CPT | Performed by: EMERGENCY MEDICINE

## 2021-01-01 PROCEDURE — 81001 URINALYSIS AUTO W/SCOPE: CPT | Performed by: EMERGENCY MEDICINE

## 2021-01-01 PROCEDURE — 99284 EMERGENCY DEPT VISIT MOD MDM: CPT

## 2021-01-01 PROCEDURE — 85027 COMPLETE CBC AUTOMATED: CPT | Performed by: INTERNAL MEDICINE

## 2021-01-01 PROCEDURE — 93005 ELECTROCARDIOGRAM TRACING: CPT

## 2021-01-01 PROCEDURE — 70498 CT ANGIOGRAPHY NECK: CPT

## 2021-01-01 PROCEDURE — 25010000002 LORAZEPAM PER 2 MG: Performed by: NURSE PRACTITIONER

## 2021-01-01 PROCEDURE — 86850 RBC ANTIBODY SCREEN: CPT | Performed by: NURSE PRACTITIONER

## 2021-01-01 PROCEDURE — 82375 ASSAY CARBOXYHB QUANT: CPT

## 2021-01-01 PROCEDURE — 99285 EMERGENCY DEPT VISIT HI MDM: CPT

## 2021-01-01 PROCEDURE — U0003 INFECTIOUS AGENT DETECTION BY NUCLEIC ACID (DNA OR RNA); SEVERE ACUTE RESPIRATORY SYNDROME CORONAVIRUS 2 (SARS-COV-2) (CORONAVIRUS DISEASE [COVID-19]), AMPLIFIED PROBE TECHNIQUE, MAKING USE OF HIGH THROUGHPUT TECHNOLOGIES AS DESCRIBED BY CMS-2020-01-R: HCPCS | Performed by: EMERGENCY MEDICINE

## 2021-01-01 PROCEDURE — 82077 ASSAY SPEC XCP UR&BREATH IA: CPT | Performed by: EMERGENCY MEDICINE

## 2021-01-01 PROCEDURE — 36600 WITHDRAWAL OF ARTERIAL BLOOD: CPT

## 2021-01-01 PROCEDURE — 83735 ASSAY OF MAGNESIUM: CPT | Performed by: EMERGENCY MEDICINE

## 2021-01-01 PROCEDURE — 74176 CT ABD & PELVIS W/O CONTRAST: CPT

## 2021-01-01 PROCEDURE — 85730 THROMBOPLASTIN TIME PARTIAL: CPT

## 2021-01-01 PROCEDURE — 85018 HEMOGLOBIN: CPT | Performed by: NURSE PRACTITIONER

## 2021-01-01 PROCEDURE — 83735 ASSAY OF MAGNESIUM: CPT | Performed by: NURSE PRACTITIONER

## 2021-01-01 PROCEDURE — 99239 HOSP IP/OBS DSCHRG MGMT >30: CPT | Performed by: INTERNAL MEDICINE

## 2021-01-01 PROCEDURE — 92610 EVALUATE SWALLOWING FUNCTION: CPT | Performed by: SPEECH-LANGUAGE PATHOLOGIST

## 2021-01-01 PROCEDURE — 84295 ASSAY OF SERUM SODIUM: CPT

## 2021-01-01 PROCEDURE — 71045 X-RAY EXAM CHEST 1 VIEW: CPT

## 2021-01-01 PROCEDURE — 87086 URINE CULTURE/COLONY COUNT: CPT | Performed by: EMERGENCY MEDICINE

## 2021-01-01 PROCEDURE — 25010000002 SODIUM CHLORIDE 0.9 % WITH KCL 20 MEQ 20-0.9 MEQ/L-% SOLUTION: Performed by: NURSE PRACTITIONER

## 2021-01-01 PROCEDURE — 85025 COMPLETE CBC W/AUTO DIFF WBC: CPT | Performed by: NURSE PRACTITIONER

## 2021-01-01 PROCEDURE — 86901 BLOOD TYPING SEROLOGIC RH(D): CPT | Performed by: NURSE PRACTITIONER

## 2021-01-01 PROCEDURE — 85025 COMPLETE CBC W/AUTO DIFF WBC: CPT | Performed by: EMERGENCY MEDICINE

## 2021-01-01 PROCEDURE — G0151 HHCP-SERV OF PT,EA 15 MIN: HCPCS

## 2021-01-01 PROCEDURE — 85025 COMPLETE CBC W/AUTO DIFF WBC: CPT

## 2021-01-01 PROCEDURE — 70551 MRI BRAIN STEM W/O DYE: CPT

## 2021-01-01 PROCEDURE — 85610 PROTHROMBIN TIME: CPT | Performed by: NURSE PRACTITIONER

## 2021-01-01 PROCEDURE — 99222 1ST HOSP IP/OBS MODERATE 55: CPT | Performed by: NURSE PRACTITIONER

## 2021-01-01 PROCEDURE — 99233 SBSQ HOSP IP/OBS HIGH 50: CPT | Performed by: INTERNAL MEDICINE

## 2021-01-01 PROCEDURE — 92523 SPEECH SOUND LANG COMPREHEN: CPT

## 2021-01-01 PROCEDURE — 83605 ASSAY OF LACTIC ACID: CPT | Performed by: EMERGENCY MEDICINE

## 2021-01-01 PROCEDURE — 82565 ASSAY OF CREATININE: CPT

## 2021-01-01 PROCEDURE — 25010000002 SODIUM CHLORIDE 0.9 % WITH KCL 20 MEQ 20-0.9 MEQ/L-% SOLUTION: Performed by: FAMILY MEDICINE

## 2021-01-01 PROCEDURE — 99233 SBSQ HOSP IP/OBS HIGH 50: CPT | Performed by: FAMILY MEDICINE

## 2021-01-01 PROCEDURE — 99233 SBSQ HOSP IP/OBS HIGH 50: CPT | Performed by: PSYCHIATRY & NEUROLOGY

## 2021-01-01 PROCEDURE — 80053 COMPREHEN METABOLIC PANEL: CPT | Performed by: NURSE PRACTITIONER

## 2021-01-01 PROCEDURE — 83050 HGB METHEMOGLOBIN QUAN: CPT

## 2021-01-01 PROCEDURE — 80053 COMPREHEN METABOLIC PANEL: CPT | Performed by: EMERGENCY MEDICINE

## 2021-01-01 PROCEDURE — 85610 PROTHROMBIN TIME: CPT

## 2021-01-01 PROCEDURE — 99214 OFFICE O/P EST MOD 30 MIN: CPT | Performed by: INTERNAL MEDICINE

## 2021-01-01 PROCEDURE — 25010000002 LORAZEPAM PER 2 MG: Performed by: INTERNAL MEDICINE

## 2021-01-01 PROCEDURE — 25010000002 HALOPERIDOL LACTATE PER 5 MG: Performed by: INTERNAL MEDICINE

## 2021-01-01 PROCEDURE — 0 IOPAMIDOL PER 1 ML: Performed by: EMERGENCY MEDICINE

## 2021-01-01 PROCEDURE — 85014 HEMATOCRIT: CPT

## 2021-01-01 PROCEDURE — 87040 BLOOD CULTURE FOR BACTERIA: CPT | Performed by: EMERGENCY MEDICINE

## 2021-01-01 PROCEDURE — 80048 BASIC METABOLIC PNL TOTAL CA: CPT | Performed by: INTERNAL MEDICINE

## 2021-01-01 PROCEDURE — 82270 OCCULT BLOOD FECES: CPT | Performed by: EMERGENCY MEDICINE

## 2021-01-01 PROCEDURE — 97162 PT EVAL MOD COMPLEX 30 MIN: CPT

## 2021-01-01 PROCEDURE — 84145 PROCALCITONIN (PCT): CPT | Performed by: EMERGENCY MEDICINE

## 2021-01-01 PROCEDURE — 0097U HC BIOFIRE FILMARRAY GI PANEL: CPT | Performed by: INTERNAL MEDICINE

## 2021-01-01 PROCEDURE — 97165 OT EVAL LOW COMPLEX 30 MIN: CPT

## 2021-01-01 PROCEDURE — 25010000002 FUROSEMIDE PER 20 MG: Performed by: NURSE PRACTITIONER

## 2021-01-01 PROCEDURE — P9612 CATHETERIZE FOR URINE SPEC: HCPCS

## 2021-01-01 PROCEDURE — 82330 ASSAY OF CALCIUM: CPT

## 2021-01-01 PROCEDURE — 25010000002 LEVOFLOXACIN PER 250 MG: Performed by: INTERNAL MEDICINE

## 2021-01-01 PROCEDURE — 80061 LIPID PANEL: CPT | Performed by: NURSE PRACTITIONER

## 2021-01-01 PROCEDURE — 83690 ASSAY OF LIPASE: CPT | Performed by: EMERGENCY MEDICINE

## 2021-01-01 PROCEDURE — 25010000002 ONDANSETRON PER 1 MG: Performed by: EMERGENCY MEDICINE

## 2021-01-01 PROCEDURE — 84132 ASSAY OF SERUM POTASSIUM: CPT

## 2021-01-01 PROCEDURE — 97116 GAIT TRAINING THERAPY: CPT

## 2021-01-01 PROCEDURE — 84460 ALANINE AMINO (ALT) (SGPT): CPT

## 2021-01-01 PROCEDURE — U0005 INFEC AGEN DETEC AMPLI PROBE: HCPCS | Performed by: EMERGENCY MEDICINE

## 2021-01-01 PROCEDURE — 84443 ASSAY THYROID STIM HORMONE: CPT | Performed by: NURSE PRACTITIONER

## 2021-01-01 PROCEDURE — 25010000002 MORPHINE PER 10 MG: Performed by: INTERNAL MEDICINE

## 2021-01-01 PROCEDURE — 93306 TTE W/DOPPLER COMPLETE: CPT | Performed by: INTERNAL MEDICINE

## 2021-01-01 PROCEDURE — 82805 BLOOD GASES W/O2 SATURATION: CPT

## 2021-01-01 PROCEDURE — 85730 THROMBOPLASTIN TIME PARTIAL: CPT | Performed by: NURSE PRACTITIONER

## 2021-01-01 PROCEDURE — 82947 ASSAY GLUCOSE BLOOD QUANT: CPT

## 2021-01-01 PROCEDURE — 0202U NFCT DS 22 TRGT SARS-COV-2: CPT | Performed by: EMERGENCY MEDICINE

## 2021-01-01 PROCEDURE — 86900 BLOOD TYPING SEROLOGIC ABO: CPT | Performed by: NURSE PRACTITIONER

## 2021-01-01 PROCEDURE — 70496 CT ANGIOGRAPHY HEAD: CPT

## 2021-01-01 PROCEDURE — 92507 TX SP LANG VOICE COMM INDIV: CPT

## 2021-01-01 PROCEDURE — 86901 BLOOD TYPING SEROLOGIC RH(D): CPT

## 2021-01-01 PROCEDURE — 84484 ASSAY OF TROPONIN QUANT: CPT | Performed by: EMERGENCY MEDICINE

## 2021-01-01 RX ORDER — ATORVASTATIN CALCIUM 40 MG/1
80 TABLET, FILM COATED ORAL NIGHTLY
Status: DISCONTINUED | OUTPATIENT
Start: 2021-01-01 | End: 2021-01-01

## 2021-01-01 RX ORDER — GLYCOPYRROLATE 0.2 MG/ML
0.2 INJECTION INTRAMUSCULAR; INTRAVENOUS EVERY 6 HOURS PRN
Status: DISCONTINUED | OUTPATIENT
Start: 2021-01-01 | End: 2021-01-01 | Stop reason: HOSPADM

## 2021-01-01 RX ORDER — ONDANSETRON 2 MG/ML
4 INJECTION INTRAMUSCULAR; INTRAVENOUS EVERY 6 HOURS PRN
Status: DISCONTINUED | OUTPATIENT
Start: 2021-01-01 | End: 2021-01-01 | Stop reason: HOSPADM

## 2021-01-01 RX ORDER — ACETAMINOPHEN 325 MG/1
650 TABLET ORAL EVERY 4 HOURS PRN
Status: DISCONTINUED | OUTPATIENT
Start: 2021-01-01 | End: 2021-01-01

## 2021-01-01 RX ORDER — SODIUM CHLORIDE 0.9 % (FLUSH) 0.9 %
10 SYRINGE (ML) INJECTION AS NEEDED
Status: DISCONTINUED | OUTPATIENT
Start: 2021-01-01 | End: 2021-01-01 | Stop reason: HOSPADM

## 2021-01-01 RX ORDER — CLOPIDOGREL BISULFATE 75 MG/1
TABLET ORAL
Qty: 90 TABLET | Refills: 3 | Status: SHIPPED | OUTPATIENT
Start: 2021-01-01

## 2021-01-01 RX ORDER — LORAZEPAM 2 MG/ML
0.25 INJECTION INTRAMUSCULAR EVERY 6 HOURS PRN
Status: CANCELLED | OUTPATIENT
Start: 2021-01-01 | End: 2021-01-01

## 2021-01-01 RX ORDER — LEVOFLOXACIN 5 MG/ML
750 INJECTION, SOLUTION INTRAVENOUS
Status: DISCONTINUED | OUTPATIENT
Start: 2021-01-01 | End: 2021-01-01

## 2021-01-01 RX ORDER — POLYVINYL ALCOHOL 14 MG/ML
2 SOLUTION/ DROPS OPHTHALMIC
Status: DISCONTINUED | OUTPATIENT
Start: 2021-01-01 | End: 2021-01-01 | Stop reason: HOSPADM

## 2021-01-01 RX ORDER — MORPHINE SULFATE 4 MG/ML
4 INJECTION, SOLUTION INTRAMUSCULAR; INTRAVENOUS
Status: DISCONTINUED | OUTPATIENT
Start: 2021-01-01 | End: 2021-01-01

## 2021-01-01 RX ORDER — CARVEDILOL 12.5 MG/1
12.5 TABLET ORAL 2 TIMES DAILY WITH MEALS
Status: DISCONTINUED | OUTPATIENT
Start: 2021-01-01 | End: 2021-01-01 | Stop reason: HOSPADM

## 2021-01-01 RX ORDER — ROSUVASTATIN CALCIUM 20 MG/1
20 TABLET, COATED ORAL NIGHTLY
Qty: 90 TABLET | Refills: 0 | Status: SHIPPED | OUTPATIENT
Start: 2021-01-01 | End: 2021-01-01 | Stop reason: SDUPTHER

## 2021-01-01 RX ORDER — ONDANSETRON 2 MG/ML
4 INJECTION INTRAMUSCULAR; INTRAVENOUS EVERY 6 HOURS PRN
Status: CANCELLED | OUTPATIENT
Start: 2021-01-01

## 2021-01-01 RX ORDER — LEVOTHYROXINE SODIUM 0.05 MG/1
50 TABLET ORAL DAILY
Status: DISCONTINUED | OUTPATIENT
Start: 2021-01-01 | End: 2021-01-01

## 2021-01-01 RX ORDER — HALOPERIDOL 5 MG/ML
1 INJECTION INTRAMUSCULAR EVERY 4 HOURS PRN
Status: DISCONTINUED | OUTPATIENT
Start: 2021-01-01 | End: 2021-01-01 | Stop reason: HOSPADM

## 2021-01-01 RX ORDER — SODIUM CHLORIDE 0.9 % (FLUSH) 0.9 %
10 SYRINGE (ML) INJECTION EVERY 12 HOURS SCHEDULED
Status: DISCONTINUED | OUTPATIENT
Start: 2021-01-01 | End: 2021-01-01

## 2021-01-01 RX ORDER — RIVAROXABAN 15 MG/1
TABLET, FILM COATED ORAL
Qty: 30 TABLET | Refills: 0 | Status: SHIPPED | OUTPATIENT
Start: 2021-01-01

## 2021-01-01 RX ORDER — LEVOTHYROXINE SODIUM 0.05 MG/1
50 TABLET ORAL
Status: DISCONTINUED | OUTPATIENT
Start: 2021-01-01 | End: 2021-01-01 | Stop reason: HOSPADM

## 2021-01-01 RX ORDER — SODIUM CHLORIDE 0.9 % (FLUSH) 0.9 %
10 SYRINGE (ML) INJECTION EVERY 12 HOURS SCHEDULED
Status: CANCELLED | OUTPATIENT
Start: 2021-01-01

## 2021-01-01 RX ORDER — LORAZEPAM 2 MG/ML
0.5 INJECTION INTRAMUSCULAR EVERY 4 HOURS
Status: DISCONTINUED | OUTPATIENT
Start: 2021-01-01 | End: 2021-01-01 | Stop reason: HOSPADM

## 2021-01-01 RX ORDER — FUROSEMIDE 10 MG/ML
20 INJECTION INTRAMUSCULAR; INTRAVENOUS EVERY 6 HOURS PRN
Status: DISCONTINUED | OUTPATIENT
Start: 2021-01-01 | End: 2021-01-01 | Stop reason: HOSPADM

## 2021-01-01 RX ORDER — MORPHINE SULFATE 4 MG/ML
4 INJECTION, SOLUTION INTRAMUSCULAR; INTRAVENOUS
Status: DISCONTINUED | OUTPATIENT
Start: 2021-01-01 | End: 2021-01-01 | Stop reason: HOSPADM

## 2021-01-01 RX ORDER — ACETAMINOPHEN 650 MG/1
650 SUPPOSITORY RECTAL EVERY 4 HOURS PRN
Status: DISCONTINUED | OUTPATIENT
Start: 2021-01-01 | End: 2021-01-01 | Stop reason: HOSPADM

## 2021-01-01 RX ORDER — ASPIRIN 300 MG/1
300 SUPPOSITORY RECTAL EVERY 6 HOURS PRN
Status: DISCONTINUED | OUTPATIENT
Start: 2021-01-01 | End: 2021-01-01 | Stop reason: HOSPADM

## 2021-01-01 RX ORDER — FAMOTIDINE 10 MG/ML
20 INJECTION, SOLUTION INTRAVENOUS EVERY 12 HOURS SCHEDULED
Status: DISCONTINUED | OUTPATIENT
Start: 2021-01-01 | End: 2021-01-01

## 2021-01-01 RX ORDER — CLOPIDOGREL BISULFATE 75 MG/1
75 TABLET ORAL DAILY
Status: DISCONTINUED | OUTPATIENT
Start: 2021-01-01 | End: 2021-01-01 | Stop reason: HOSPADM

## 2021-01-01 RX ORDER — SODIUM CHLORIDE 0.9 % (FLUSH) 0.9 %
10 SYRINGE (ML) INJECTION AS NEEDED
Status: CANCELLED | OUTPATIENT
Start: 2021-01-01

## 2021-01-01 RX ORDER — SODIUM CHLORIDE AND POTASSIUM CHLORIDE 150; 900 MG/100ML; MG/100ML
75 INJECTION, SOLUTION INTRAVENOUS CONTINUOUS
Status: ACTIVE | OUTPATIENT
Start: 2021-01-01 | End: 2021-01-01

## 2021-01-01 RX ORDER — MORPHINE SULFATE 2 MG/ML
2 INJECTION, SOLUTION INTRAMUSCULAR; INTRAVENOUS EVERY 4 HOURS
Status: DISCONTINUED | OUTPATIENT
Start: 2021-01-01 | End: 2021-01-01

## 2021-01-01 RX ORDER — ASPIRIN 300 MG/1
300 SUPPOSITORY RECTAL DAILY
Status: DISCONTINUED | OUTPATIENT
Start: 2021-01-01 | End: 2021-01-01

## 2021-01-01 RX ORDER — FERROUS SULFATE 325(65) MG
325 TABLET ORAL
Status: DISCONTINUED | OUTPATIENT
Start: 2021-01-01 | End: 2021-01-01

## 2021-01-01 RX ORDER — MORPHINE SULFATE 2 MG/ML
1 INJECTION, SOLUTION INTRAMUSCULAR; INTRAVENOUS
Status: CANCELLED | OUTPATIENT
Start: 2021-01-01 | End: 2021-01-01

## 2021-01-01 RX ORDER — CEFTRIAXONE 500 MG/1
490 INJECTION, POWDER, FOR SOLUTION INTRAMUSCULAR; INTRAVENOUS ONCE
Status: COMPLETED | OUTPATIENT
Start: 2021-01-01 | End: 2021-01-01

## 2021-01-01 RX ORDER — LANOLIN ALCOHOL/MO/W.PET/CERES
800 CREAM (GRAM) TOPICAL DAILY
Status: DISCONTINUED | OUTPATIENT
Start: 2021-01-01 | End: 2021-01-01 | Stop reason: HOSPADM

## 2021-01-01 RX ORDER — FERROUS SULFATE 325(65) MG
325 TABLET ORAL
Status: DISCONTINUED | OUTPATIENT
Start: 2021-01-01 | End: 2021-01-01 | Stop reason: HOSPADM

## 2021-01-01 RX ORDER — CARVEDILOL 12.5 MG/1
12.5 TABLET ORAL 2 TIMES DAILY WITH MEALS
COMMUNITY

## 2021-01-01 RX ORDER — ACETAMINOPHEN 160 MG/5ML
650 SOLUTION ORAL EVERY 4 HOURS PRN
Status: CANCELLED | OUTPATIENT
Start: 2021-01-01

## 2021-01-01 RX ORDER — KETOROLAC TROMETHAMINE 30 MG/ML
15 INJECTION, SOLUTION INTRAMUSCULAR; INTRAVENOUS EVERY 6 HOURS PRN
Status: ACTIVE | OUTPATIENT
Start: 2021-01-01 | End: 2021-01-01

## 2021-01-01 RX ORDER — MORPHINE SULFATE 2 MG/ML
1 INJECTION, SOLUTION INTRAMUSCULAR; INTRAVENOUS
Status: DISCONTINUED | OUTPATIENT
Start: 2021-01-01 | End: 2021-01-01 | Stop reason: HOSPADM

## 2021-01-01 RX ORDER — LANOLIN ALCOHOL/MO/W.PET/CERES
800 CREAM (GRAM) TOPICAL DAILY
Status: DISCONTINUED | OUTPATIENT
Start: 2021-01-01 | End: 2021-01-01

## 2021-01-01 RX ORDER — SODIUM CHLORIDE 0.9 % (FLUSH) 0.9 %
10 SYRINGE (ML) INJECTION EVERY 12 HOURS SCHEDULED
Status: DISCONTINUED | OUTPATIENT
Start: 2021-01-01 | End: 2021-01-01 | Stop reason: HOSPADM

## 2021-01-01 RX ORDER — POLYVINYL ALCOHOL 14 MG/ML
2 SOLUTION/ DROPS OPHTHALMIC 2 TIMES DAILY
Status: DISCONTINUED | OUTPATIENT
Start: 2021-01-01 | End: 2021-01-01 | Stop reason: HOSPADM

## 2021-01-01 RX ORDER — CHOLECALCIFEROL (VITAMIN D3) 125 MCG
5 CAPSULE ORAL NIGHTLY PRN
Status: DISCONTINUED | OUTPATIENT
Start: 2021-01-01 | End: 2021-01-01 | Stop reason: HOSPADM

## 2021-01-01 RX ORDER — ROSUVASTATIN CALCIUM 20 MG/1
20 TABLET, COATED ORAL NIGHTLY
Qty: 90 TABLET | Refills: 1 | Status: SHIPPED | OUTPATIENT
Start: 2021-01-01

## 2021-01-01 RX ORDER — LORAZEPAM 2 MG/ML
0.5 INJECTION INTRAMUSCULAR EVERY 4 HOURS PRN
Status: DISCONTINUED | OUTPATIENT
Start: 2021-01-01 | End: 2021-01-01 | Stop reason: HOSPADM

## 2021-01-01 RX ORDER — ACETAMINOPHEN 160 MG/5ML
650 SOLUTION ORAL EVERY 4 HOURS PRN
Status: DISCONTINUED | OUTPATIENT
Start: 2021-01-01 | End: 2021-01-01 | Stop reason: HOSPADM

## 2021-01-01 RX ORDER — MORPHINE SULFATE 4 MG/ML
4 INJECTION, SOLUTION INTRAMUSCULAR; INTRAVENOUS EVERY 4 HOURS
Status: DISCONTINUED | OUTPATIENT
Start: 2021-01-01 | End: 2021-01-01 | Stop reason: HOSPADM

## 2021-01-01 RX ORDER — ONDANSETRON 2 MG/ML
4 INJECTION INTRAMUSCULAR; INTRAVENOUS ONCE
Status: COMPLETED | OUTPATIENT
Start: 2021-01-01 | End: 2021-01-01

## 2021-01-01 RX ORDER — LEVOFLOXACIN 5 MG/ML
750 INJECTION, SOLUTION INTRAVENOUS
Status: COMPLETED | OUTPATIENT
Start: 2021-01-01 | End: 2021-01-01

## 2021-01-01 RX ORDER — SODIUM CHLORIDE AND POTASSIUM CHLORIDE 150; 900 MG/100ML; MG/100ML
75 INJECTION, SOLUTION INTRAVENOUS CONTINUOUS
Status: DISCONTINUED | OUTPATIENT
Start: 2021-01-01 | End: 2021-01-01

## 2021-01-01 RX ORDER — SCOLOPAMINE TRANSDERMAL SYSTEM 1 MG/1
1 PATCH, EXTENDED RELEASE TRANSDERMAL
Status: DISCONTINUED | OUTPATIENT
Start: 2021-01-01 | End: 2021-01-01 | Stop reason: HOSPADM

## 2021-01-01 RX ORDER — ROSUVASTATIN CALCIUM 10 MG/1
10 TABLET, COATED ORAL NIGHTLY
Status: DISCONTINUED | OUTPATIENT
Start: 2021-01-01 | End: 2021-01-01

## 2021-01-01 RX ORDER — LORAZEPAM 2 MG/ML
0.25 INJECTION INTRAMUSCULAR EVERY 6 HOURS PRN
Status: DISCONTINUED | OUTPATIENT
Start: 2021-01-01 | End: 2021-01-01 | Stop reason: HOSPADM

## 2021-01-01 RX ORDER — MULTIPLE VITAMINS W/ MINERALS TAB 9MG-400MCG
1 TAB ORAL DAILY
Status: DISCONTINUED | OUTPATIENT
Start: 2021-01-01 | End: 2021-01-01

## 2021-01-01 RX ORDER — CLOPIDOGREL BISULFATE 75 MG/1
75 TABLET ORAL DAILY
Qty: 30 TABLET | Refills: 0 | Status: SHIPPED | OUTPATIENT
Start: 2021-01-01 | End: 2021-01-01

## 2021-01-01 RX ORDER — DOCUSATE SODIUM 100 MG/1
100 CAPSULE, LIQUID FILLED ORAL DAILY
Status: DISCONTINUED | OUTPATIENT
Start: 2021-01-01 | End: 2021-01-01 | Stop reason: HOSPADM

## 2021-01-01 RX ORDER — BUMETANIDE 0.25 MG/ML
0.5 INJECTION INTRAMUSCULAR; INTRAVENOUS EVERY 8 HOURS
Status: DISCONTINUED | OUTPATIENT
Start: 2021-01-01 | End: 2021-01-01 | Stop reason: HOSPADM

## 2021-01-01 RX ORDER — MORPHINE SULFATE 20 MG/ML
5 SOLUTION ORAL EVERY 4 HOURS PRN
Status: DISCONTINUED | OUTPATIENT
Start: 2021-01-01 | End: 2021-01-01

## 2021-01-01 RX ORDER — FUROSEMIDE 10 MG/ML
20 INJECTION INTRAMUSCULAR; INTRAVENOUS EVERY 8 HOURS
Status: DISCONTINUED | OUTPATIENT
Start: 2021-01-01 | End: 2021-01-01

## 2021-01-01 RX ORDER — MORPHINE SULFATE 2 MG/ML
2 INJECTION, SOLUTION INTRAMUSCULAR; INTRAVENOUS
Status: DISCONTINUED | OUTPATIENT
Start: 2021-01-01 | End: 2021-01-01

## 2021-01-01 RX ORDER — ACETAMINOPHEN 650 MG/1
650 SUPPOSITORY RECTAL EVERY 4 HOURS PRN
Status: CANCELLED | OUTPATIENT
Start: 2021-01-01

## 2021-01-01 RX ORDER — VANCOMYCIN HYDROCHLORIDE 125 MG/1
125 CAPSULE ORAL EVERY 6 HOURS SCHEDULED
Status: DISCONTINUED | OUTPATIENT
Start: 2021-01-01 | End: 2021-01-01

## 2021-01-01 RX ORDER — ASPIRIN 81 MG/1
81 TABLET, CHEWABLE ORAL DAILY
Status: DISCONTINUED | OUTPATIENT
Start: 2021-01-01 | End: 2021-01-01

## 2021-01-01 RX ORDER — ATORVASTATIN CALCIUM 40 MG/1
40 TABLET, FILM COATED ORAL NIGHTLY
Status: DISCONTINUED | OUTPATIENT
Start: 2021-01-01 | End: 2021-01-01 | Stop reason: HOSPADM

## 2021-01-01 RX ADMIN — LORAZEPAM 0.5 MG: 2 INJECTION INTRAMUSCULAR; INTRAVENOUS at 02:09

## 2021-01-01 RX ADMIN — MORPHINE SULFATE 2 MG: 2 INJECTION, SOLUTION INTRAMUSCULAR; INTRAVENOUS at 16:56

## 2021-01-01 RX ADMIN — LORAZEPAM 0.5 MG: 2 INJECTION INTRAMUSCULAR; INTRAVENOUS at 09:22

## 2021-01-01 RX ADMIN — VANCOMYCIN HYDROCHLORIDE 125 MG: KIT at 05:18

## 2021-01-01 RX ADMIN — FAMOTIDINE 20 MG: 10 INJECTION, SOLUTION INTRAVENOUS at 22:54

## 2021-01-01 RX ADMIN — MORPHINE SULFATE 2 MG: 2 INJECTION, SOLUTION INTRAMUSCULAR; INTRAVENOUS at 14:18

## 2021-01-01 RX ADMIN — LORAZEPAM 0.5 MG: 2 INJECTION INTRAMUSCULAR; INTRAVENOUS at 02:01

## 2021-01-01 RX ADMIN — SODIUM CHLORIDE, POTASSIUM CHLORIDE, SODIUM LACTATE AND CALCIUM CHLORIDE 1000 ML: 600; 310; 30; 20 INJECTION, SOLUTION INTRAVENOUS at 10:05

## 2021-01-01 RX ADMIN — POLYVINYL ALCOHOL 2 DROP: 14 SOLUTION/ DROPS OPHTHALMIC at 21:31

## 2021-01-01 RX ADMIN — ATORVASTATIN CALCIUM 80 MG: 40 TABLET, FILM COATED ORAL at 21:05

## 2021-01-01 RX ADMIN — LORAZEPAM 0.5 MG: 2 INJECTION INTRAMUSCULAR; INTRAVENOUS at 22:03

## 2021-01-01 RX ADMIN — SODIUM CHLORIDE 1 G: 900 INJECTION INTRAVENOUS at 21:05

## 2021-01-01 RX ADMIN — LORAZEPAM 0.5 MG: 2 INJECTION INTRAMUSCULAR; INTRAVENOUS at 21:08

## 2021-01-01 RX ADMIN — LORAZEPAM 0.5 MG: 2 INJECTION INTRAMUSCULAR; INTRAVENOUS at 21:28

## 2021-01-01 RX ADMIN — Medication: at 18:01

## 2021-01-01 RX ADMIN — LORAZEPAM 0.5 MG: 2 INJECTION INTRAMUSCULAR; INTRAVENOUS at 15:11

## 2021-01-01 RX ADMIN — LORAZEPAM 0.5 MG: 2 INJECTION INTRAMUSCULAR; INTRAVENOUS at 22:31

## 2021-01-01 RX ADMIN — RIVAROXABAN 15 MG: 15 TABLET, FILM COATED ORAL at 17:55

## 2021-01-01 RX ADMIN — FAMOTIDINE 20 MG: 10 INJECTION, SOLUTION INTRAVENOUS at 10:16

## 2021-01-01 RX ADMIN — MORPHINE SULFATE 2 MG: 2 INJECTION, SOLUTION INTRAMUSCULAR; INTRAVENOUS at 05:37

## 2021-01-01 RX ADMIN — Medication: at 05:46

## 2021-01-01 RX ADMIN — MORPHINE SULFATE 2 MG: 2 INJECTION, SOLUTION INTRAMUSCULAR; INTRAVENOUS at 22:31

## 2021-01-01 RX ADMIN — SODIUM CHLORIDE, PRESERVATIVE FREE 10 ML: 5 INJECTION INTRAVENOUS at 09:05

## 2021-01-01 RX ADMIN — BUMETANIDE 0.5 MG: 0.25 INJECTION INTRAMUSCULAR; INTRAVENOUS at 14:13

## 2021-01-01 RX ADMIN — MORPHINE SULFATE 4 MG: 4 INJECTION, SOLUTION INTRAMUSCULAR; INTRAVENOUS at 17:46

## 2021-01-01 RX ADMIN — Medication: at 22:03

## 2021-01-01 RX ADMIN — DOCUSATE SODIUM 100 MG: 100 CAPSULE ORAL at 09:05

## 2021-01-01 RX ADMIN — LORAZEPAM 0.5 MG: 2 INJECTION INTRAMUSCULAR; INTRAVENOUS at 01:10

## 2021-01-01 RX ADMIN — MORPHINE SULFATE 2 MG: 2 INJECTION, SOLUTION INTRAMUSCULAR; INTRAVENOUS at 05:40

## 2021-01-01 RX ADMIN — LORAZEPAM 0.5 MG: 2 INJECTION INTRAMUSCULAR; INTRAVENOUS at 08:17

## 2021-01-01 RX ADMIN — BUMETANIDE 0.5 MG: 0.25 INJECTION INTRAMUSCULAR; INTRAVENOUS at 05:45

## 2021-01-01 RX ADMIN — BUMETANIDE 0.5 MG: 0.25 INJECTION INTRAMUSCULAR; INTRAVENOUS at 15:11

## 2021-01-01 RX ADMIN — Medication 800 MCG: at 09:04

## 2021-01-01 RX ADMIN — BUMETANIDE 0.5 MG: 0.25 INJECTION INTRAMUSCULAR; INTRAVENOUS at 05:49

## 2021-01-01 RX ADMIN — MORPHINE SULFATE 2 MG: 2 INJECTION, SOLUTION INTRAMUSCULAR; INTRAVENOUS at 01:10

## 2021-01-01 RX ADMIN — POLYVINYL ALCOHOL 2 DROP: 14 SOLUTION/ DROPS OPHTHALMIC at 13:39

## 2021-01-01 RX ADMIN — FERROUS SULFATE TAB 325 MG (65 MG ELEMENTAL FE) 325 MG: 325 (65 FE) TAB at 08:37

## 2021-01-01 RX ADMIN — MORPHINE SULFATE 2 MG: 2 INJECTION, SOLUTION INTRAMUSCULAR; INTRAVENOUS at 17:56

## 2021-01-01 RX ADMIN — MORPHINE SULFATE 4 MG: 4 INJECTION, SOLUTION INTRAMUSCULAR; INTRAVENOUS at 15:39

## 2021-01-01 RX ADMIN — MORPHINE SULFATE 2 MG: 2 INJECTION, SOLUTION INTRAMUSCULAR; INTRAVENOUS at 18:13

## 2021-01-01 RX ADMIN — HALOPERIDOL LACTATE 1 MG: 5 INJECTION, SOLUTION INTRAMUSCULAR at 20:25

## 2021-01-01 RX ADMIN — CEFTRIAXONE SODIUM 490 MG: 500 INJECTION, POWDER, FOR SOLUTION INTRAMUSCULAR; INTRAVENOUS at 16:19

## 2021-01-01 RX ADMIN — MORPHINE SULFATE 4 MG: 4 INJECTION, SOLUTION INTRAMUSCULAR; INTRAVENOUS at 13:40

## 2021-01-01 RX ADMIN — FERROUS SULFATE TAB 325 MG (65 MG ELEMENTAL FE) 325 MG: 325 (65 FE) TAB at 09:05

## 2021-01-01 RX ADMIN — ASPIRIN 81 MG: 81 TABLET, CHEWABLE ORAL at 09:03

## 2021-01-01 RX ADMIN — Medication 800 MCG: at 08:38

## 2021-01-01 RX ADMIN — IOPAMIDOL 115 ML: 755 INJECTION, SOLUTION INTRAVENOUS at 21:55

## 2021-01-01 RX ADMIN — BUMETANIDE 0.5 MG: 0.25 INJECTION INTRAMUSCULAR; INTRAVENOUS at 20:24

## 2021-01-01 RX ADMIN — SODIUM CHLORIDE, PRESERVATIVE FREE 10 ML: 5 INJECTION INTRAVENOUS at 09:08

## 2021-01-01 RX ADMIN — MORPHINE SULFATE 4 MG: 4 INJECTION, SOLUTION INTRAMUSCULAR; INTRAVENOUS at 15:12

## 2021-01-01 RX ADMIN — LORAZEPAM 0.5 MG: 2 INJECTION INTRAMUSCULAR; INTRAVENOUS at 17:56

## 2021-01-01 RX ADMIN — MORPHINE SULFATE 2 MG: 2 INJECTION, SOLUTION INTRAMUSCULAR; INTRAVENOUS at 11:27

## 2021-01-01 RX ADMIN — LORAZEPAM 0.5 MG: 2 INJECTION INTRAMUSCULAR; INTRAVENOUS at 18:26

## 2021-01-01 RX ADMIN — LORAZEPAM 0.5 MG: 2 INJECTION INTRAMUSCULAR; INTRAVENOUS at 18:00

## 2021-01-01 RX ADMIN — BUMETANIDE 0.5 MG: 0.25 INJECTION INTRAMUSCULAR; INTRAVENOUS at 22:03

## 2021-01-01 RX ADMIN — LEVOTHYROXINE SODIUM 50 MCG: 50 TABLET ORAL at 09:04

## 2021-01-01 RX ADMIN — CARVEDILOL 12.5 MG: 12.5 TABLET, FILM COATED ORAL at 17:55

## 2021-01-01 RX ADMIN — CARVEDILOL 12.5 MG: 12.5 TABLET, FILM COATED ORAL at 08:37

## 2021-01-01 RX ADMIN — MORPHINE SULFATE 4 MG: 4 INJECTION, SOLUTION INTRAMUSCULAR; INTRAVENOUS at 02:12

## 2021-01-01 RX ADMIN — GLYCOPYRROLATE 0.2 MG: 0.2 INJECTION INTRAMUSCULAR; INTRAVENOUS at 06:28

## 2021-01-01 RX ADMIN — MORPHINE SULFATE 4 MG: 4 INJECTION, SOLUTION INTRAMUSCULAR; INTRAVENOUS at 05:46

## 2021-01-01 RX ADMIN — VANCOMYCIN HYDROCHLORIDE 125 MG: 125 CAPSULE ORAL at 23:13

## 2021-01-01 RX ADMIN — MORPHINE SULFATE 4 MG: 4 INJECTION, SOLUTION INTRAMUSCULAR; INTRAVENOUS at 18:01

## 2021-01-01 RX ADMIN — SODIUM CHLORIDE, PRESERVATIVE FREE 10 ML: 5 INJECTION INTRAVENOUS at 00:51

## 2021-01-01 RX ADMIN — Medication: at 10:43

## 2021-01-01 RX ADMIN — Medication: at 17:46

## 2021-01-01 RX ADMIN — DOCUSATE SODIUM 100 MG: 100 CAPSULE ORAL at 09:03

## 2021-01-01 RX ADMIN — Medication: at 14:13

## 2021-01-01 RX ADMIN — SODIUM CHLORIDE, PRESERVATIVE FREE 10 ML: 5 INJECTION INTRAVENOUS at 20:30

## 2021-01-01 RX ADMIN — MORPHINE SULFATE 4 MG: 4 INJECTION, SOLUTION INTRAMUSCULAR; INTRAVENOUS at 22:00

## 2021-01-01 RX ADMIN — MORPHINE SULFATE 2 MG: 2 INJECTION, SOLUTION INTRAMUSCULAR; INTRAVENOUS at 17:24

## 2021-01-01 RX ADMIN — MORPHINE SULFATE 4 MG: 4 INJECTION, SOLUTION INTRAMUSCULAR; INTRAVENOUS at 21:29

## 2021-01-01 RX ADMIN — FAMOTIDINE 20 MG: 10 INJECTION, SOLUTION INTRAVENOUS at 08:58

## 2021-01-01 RX ADMIN — MINERAL OIL: 1000 LIQUID ORAL at 02:14

## 2021-01-01 RX ADMIN — POLYVINYL ALCOHOL 2 DROP: 14 SOLUTION/ DROPS OPHTHALMIC at 09:22

## 2021-01-01 RX ADMIN — SODIUM CHLORIDE, PRESERVATIVE FREE 10 ML: 5 INJECTION INTRAVENOUS at 00:52

## 2021-01-01 RX ADMIN — LEVOTHYROXINE SODIUM 50 MCG: 50 TABLET ORAL at 06:45

## 2021-01-01 RX ADMIN — Medication: at 21:31

## 2021-01-01 RX ADMIN — MORPHINE SULFATE 2 MG: 2 INJECTION, SOLUTION INTRAMUSCULAR; INTRAVENOUS at 21:37

## 2021-01-01 RX ADMIN — BUMETANIDE 0.5 MG: 0.25 INJECTION INTRAMUSCULAR; INTRAVENOUS at 21:29

## 2021-01-01 RX ADMIN — MORPHINE SULFATE 2 MG: 2 INJECTION, SOLUTION INTRAMUSCULAR; INTRAVENOUS at 02:19

## 2021-01-01 RX ADMIN — VANCOMYCIN HYDROCHLORIDE 125 MG: KIT at 23:27

## 2021-01-01 RX ADMIN — MORPHINE SULFATE 4 MG: 4 INJECTION, SOLUTION INTRAMUSCULAR; INTRAVENOUS at 20:25

## 2021-01-01 RX ADMIN — LORAZEPAM 0.5 MG: 2 INJECTION INTRAMUSCULAR; INTRAVENOUS at 14:18

## 2021-01-01 RX ADMIN — Medication: at 16:26

## 2021-01-01 RX ADMIN — MORPHINE SULFATE 1 MG: 2 INJECTION, SOLUTION INTRAMUSCULAR; INTRAVENOUS at 13:10

## 2021-01-01 RX ADMIN — SODIUM CHLORIDE, PRESERVATIVE FREE 10 ML: 5 INJECTION INTRAVENOUS at 11:28

## 2021-01-01 RX ADMIN — LORAZEPAM 0.5 MG: 2 INJECTION INTRAMUSCULAR; INTRAVENOUS at 13:18

## 2021-01-01 RX ADMIN — CLOPIDOGREL BISULFATE 75 MG: 75 TABLET ORAL at 08:38

## 2021-01-01 RX ADMIN — MORPHINE SULFATE 2 MG: 2 INJECTION, SOLUTION INTRAMUSCULAR; INTRAVENOUS at 10:00

## 2021-01-01 RX ADMIN — ONDANSETRON 4 MG: 2 INJECTION INTRAMUSCULAR; INTRAVENOUS at 10:06

## 2021-01-01 RX ADMIN — LORAZEPAM 0.5 MG: 2 INJECTION INTRAMUSCULAR; INTRAVENOUS at 18:12

## 2021-01-01 RX ADMIN — ASPIRIN 81 MG: 81 TABLET, CHEWABLE ORAL at 09:04

## 2021-01-01 RX ADMIN — SODIUM CHLORIDE, PRESERVATIVE FREE 10 ML: 5 INJECTION INTRAVENOUS at 10:17

## 2021-01-01 RX ADMIN — GLYCOPYRROLATE 0.2 MG: 0.2 INJECTION INTRAMUSCULAR; INTRAVENOUS at 23:59

## 2021-01-01 RX ADMIN — THIAMINE HCL TAB 100 MG 100 MG: 100 TAB at 09:05

## 2021-01-01 RX ADMIN — VANCOMYCIN HYDROCHLORIDE 125 MG: KIT at 18:22

## 2021-01-01 RX ADMIN — MORPHINE SULFATE 4 MG: 4 INJECTION, SOLUTION INTRAMUSCULAR; INTRAVENOUS at 22:04

## 2021-01-01 RX ADMIN — LORAZEPAM 0.5 MG: 2 INJECTION INTRAMUSCULAR; INTRAVENOUS at 02:19

## 2021-01-01 RX ADMIN — METRONIDAZOLE 500 MG: 500 INJECTION, SOLUTION INTRAVENOUS at 23:13

## 2021-01-01 RX ADMIN — SCOPALAMINE 1 PATCH: 1 PATCH, EXTENDED RELEASE TRANSDERMAL at 18:13

## 2021-01-01 RX ADMIN — MORPHINE SULFATE 2 MG: 2 INJECTION, SOLUTION INTRAMUSCULAR; INTRAVENOUS at 09:14

## 2021-01-01 RX ADMIN — SCOPALAMINE 1 PATCH: 1 PATCH, EXTENDED RELEASE TRANSDERMAL at 22:02

## 2021-01-01 RX ADMIN — VANCOMYCIN HYDROCHLORIDE 125 MG: 125 CAPSULE ORAL at 06:18

## 2021-01-01 RX ADMIN — Medication: at 21:29

## 2021-01-01 RX ADMIN — POLYVINYL ALCOHOL 2 DROP: 14 SOLUTION/ DROPS OPHTHALMIC at 20:15

## 2021-01-01 RX ADMIN — THIAMINE HCL TAB 100 MG 100 MG: 100 TAB at 08:37

## 2021-01-01 RX ADMIN — GLYCOPYRROLATE 0.2 MG: 0.2 INJECTION INTRAMUSCULAR; INTRAVENOUS at 23:53

## 2021-01-01 RX ADMIN — MORPHINE SULFATE 4 MG: 4 INJECTION, SOLUTION INTRAMUSCULAR; INTRAVENOUS at 01:49

## 2021-01-01 RX ADMIN — MORPHINE SULFATE 4 MG: 4 INJECTION, SOLUTION INTRAMUSCULAR; INTRAVENOUS at 05:49

## 2021-01-01 RX ADMIN — LORAZEPAM 0.5 MG: 2 INJECTION INTRAMUSCULAR; INTRAVENOUS at 21:31

## 2021-01-01 RX ADMIN — MORPHINE SULFATE 2 MG: 2 INJECTION, SOLUTION INTRAMUSCULAR; INTRAVENOUS at 10:40

## 2021-01-01 RX ADMIN — VANCOMYCIN HYDROCHLORIDE 125 MG: 125 CAPSULE ORAL at 22:54

## 2021-01-01 RX ADMIN — GLYCOPYRROLATE 0.2 MG: 0.2 INJECTION INTRAMUSCULAR; INTRAVENOUS at 17:53

## 2021-01-01 RX ADMIN — ATORVASTATIN CALCIUM 80 MG: 40 TABLET, FILM COATED ORAL at 01:43

## 2021-01-01 RX ADMIN — LORAZEPAM 0.5 MG: 2 INJECTION INTRAMUSCULAR; INTRAVENOUS at 10:43

## 2021-01-01 RX ADMIN — MORPHINE SULFATE 4 MG: 4 INJECTION, SOLUTION INTRAMUSCULAR; INTRAVENOUS at 14:13

## 2021-01-01 RX ADMIN — LORAZEPAM 0.5 MG: 2 INJECTION INTRAMUSCULAR; INTRAVENOUS at 06:30

## 2021-01-01 RX ADMIN — LORAZEPAM 0.5 MG: 2 INJECTION INTRAMUSCULAR; INTRAVENOUS at 14:13

## 2021-01-01 RX ADMIN — SODIUM CHLORIDE 1 G: 900 INJECTION INTRAVENOUS at 14:16

## 2021-01-01 RX ADMIN — FAMOTIDINE 20 MG: 10 INJECTION, SOLUTION INTRAVENOUS at 20:30

## 2021-01-01 RX ADMIN — MORPHINE SULFATE 2 MG: 2 INJECTION, SOLUTION INTRAMUSCULAR; INTRAVENOUS at 02:09

## 2021-01-01 RX ADMIN — SODIUM CHLORIDE 1 G: 900 INJECTION INTRAVENOUS at 00:55

## 2021-01-01 RX ADMIN — Medication: at 02:02

## 2021-01-01 RX ADMIN — LORAZEPAM 0.5 MG: 2 INJECTION INTRAMUSCULAR; INTRAVENOUS at 13:51

## 2021-01-01 RX ADMIN — LORAZEPAM 0.5 MG: 2 INJECTION INTRAMUSCULAR; INTRAVENOUS at 11:28

## 2021-01-01 RX ADMIN — LORAZEPAM 0.5 MG: 2 INJECTION INTRAMUSCULAR; INTRAVENOUS at 13:40

## 2021-01-01 RX ADMIN — LORAZEPAM 0.5 MG: 2 INJECTION INTRAMUSCULAR; INTRAVENOUS at 17:45

## 2021-01-01 RX ADMIN — MORPHINE SULFATE 4 MG: 4 INJECTION, SOLUTION INTRAMUSCULAR; INTRAVENOUS at 21:30

## 2021-01-01 RX ADMIN — SCOPALAMINE 1 PATCH: 1 PATCH, EXTENDED RELEASE TRANSDERMAL at 11:51

## 2021-01-01 RX ADMIN — MORPHINE SULFATE 4 MG: 4 INJECTION, SOLUTION INTRAMUSCULAR; INTRAVENOUS at 02:01

## 2021-01-01 RX ADMIN — LEVOFLOXACIN 750 MG: 750 INJECTION, SOLUTION INTRAVENOUS at 02:32

## 2021-01-01 RX ADMIN — MORPHINE SULFATE 4 MG: 4 INJECTION, SOLUTION INTRAMUSCULAR; INTRAVENOUS at 18:26

## 2021-01-01 RX ADMIN — DOCUSATE SODIUM 100 MG: 100 CAPSULE ORAL at 08:38

## 2021-01-01 RX ADMIN — FERROUS SULFATE TAB 325 MG (65 MG ELEMENTAL FE) 325 MG: 325 (65 FE) TAB at 09:03

## 2021-01-01 RX ADMIN — POTASSIUM CHLORIDE AND SODIUM CHLORIDE 75 ML/HR: 900; 150 INJECTION, SOLUTION INTRAVENOUS at 14:12

## 2021-01-01 RX ADMIN — LORAZEPAM 0.5 MG: 2 INJECTION INTRAMUSCULAR; INTRAVENOUS at 21:29

## 2021-01-01 RX ADMIN — LORAZEPAM 0.25 MG: 2 INJECTION INTRAMUSCULAR; INTRAVENOUS at 15:46

## 2021-01-01 RX ADMIN — Medication: at 15:12

## 2021-01-01 RX ADMIN — POTASSIUM CHLORIDE AND SODIUM CHLORIDE 75 ML/HR: 900; 150 INJECTION, SOLUTION INTRAVENOUS at 03:33

## 2021-01-01 RX ADMIN — MORPHINE SULFATE 4 MG: 4 INJECTION, SOLUTION INTRAMUSCULAR; INTRAVENOUS at 08:17

## 2021-01-01 RX ADMIN — LEVOTHYROXINE SODIUM 50 MCG: 50 TABLET ORAL at 05:19

## 2021-01-01 RX ADMIN — LORAZEPAM 0.5 MG: 2 INJECTION INTRAMUSCULAR; INTRAVENOUS at 05:40

## 2021-01-01 RX ADMIN — CARVEDILOL 12.5 MG: 12.5 TABLET, FILM COATED ORAL at 09:07

## 2021-01-01 RX ADMIN — Medication: at 05:53

## 2021-01-01 RX ADMIN — MORPHINE SULFATE 4 MG: 4 INJECTION, SOLUTION INTRAMUSCULAR; INTRAVENOUS at 10:43

## 2021-01-01 RX ADMIN — VANCOMYCIN HYDROCHLORIDE 125 MG: KIT at 14:09

## 2021-01-01 RX ADMIN — LORAZEPAM 0.5 MG: 2 INJECTION INTRAMUSCULAR; INTRAVENOUS at 02:12

## 2021-01-01 RX ADMIN — MORPHINE SULFATE 2 MG: 2 INJECTION, SOLUTION INTRAMUSCULAR; INTRAVENOUS at 06:30

## 2021-01-01 RX ADMIN — SODIUM CHLORIDE, PRESERVATIVE FREE 10 ML: 5 INJECTION INTRAVENOUS at 20:25

## 2021-01-01 RX ADMIN — LORAZEPAM 0.5 MG: 2 INJECTION INTRAMUSCULAR; INTRAVENOUS at 17:24

## 2021-01-01 RX ADMIN — MORPHINE SULFATE 4 MG: 4 INJECTION, SOLUTION INTRAMUSCULAR; INTRAVENOUS at 18:28

## 2021-01-01 RX ADMIN — MORPHINE SULFATE 2 MG: 2 INJECTION, SOLUTION INTRAMUSCULAR; INTRAVENOUS at 21:08

## 2021-01-01 RX ADMIN — LORAZEPAM 0.5 MG: 2 INJECTION INTRAMUSCULAR; INTRAVENOUS at 05:49

## 2021-01-01 RX ADMIN — ATORVASTATIN CALCIUM 80 MG: 40 TABLET, FILM COATED ORAL at 20:46

## 2021-01-01 RX ADMIN — SODIUM BICARBONATE 37.5 MEQ: 84 INJECTION, SOLUTION INTRAVENOUS at 02:33

## 2021-01-01 RX ADMIN — THIAMINE HCL TAB 100 MG 100 MG: 100 TAB at 09:03

## 2021-01-01 RX ADMIN — POTASSIUM CHLORIDE AND SODIUM CHLORIDE 100 ML/HR: 900; 150 INJECTION, SOLUTION INTRAVENOUS at 17:52

## 2021-01-01 RX ADMIN — LORAZEPAM 0.5 MG: 2 INJECTION INTRAMUSCULAR; INTRAVENOUS at 05:45

## 2021-01-01 RX ADMIN — MORPHINE SULFATE 2 MG: 2 INJECTION, SOLUTION INTRAMUSCULAR; INTRAVENOUS at 13:51

## 2021-01-01 RX ADMIN — LORAZEPAM 0.5 MG: 2 INJECTION INTRAMUSCULAR; INTRAVENOUS at 05:37

## 2021-01-01 RX ADMIN — SODIUM CHLORIDE, PRESERVATIVE FREE 10 ML: 5 INJECTION INTRAVENOUS at 21:05

## 2021-01-01 RX ADMIN — MORPHINE SULFATE 2 MG: 2 INJECTION, SOLUTION INTRAMUSCULAR; INTRAVENOUS at 04:45

## 2021-01-01 RX ADMIN — FUROSEMIDE 20 MG: 10 INJECTION INTRAMUSCULAR; INTRAVENOUS at 13:40

## 2021-01-01 RX ADMIN — LORAZEPAM 0.5 MG: 2 INJECTION INTRAMUSCULAR; INTRAVENOUS at 10:00

## 2021-01-01 RX ADMIN — LORAZEPAM 0.5 MG: 2 INJECTION INTRAMUSCULAR; INTRAVENOUS at 09:13

## 2021-01-01 RX ADMIN — ROSUVASTATIN CALCIUM 10 MG: 10 TABLET, COATED ORAL at 22:54

## 2021-01-01 RX ADMIN — MORPHINE SULFATE 2 MG: 2 INJECTION, SOLUTION INTRAMUSCULAR; INTRAVENOUS at 21:30

## 2021-01-01 RX ADMIN — LORAZEPAM 0.25 MG: 2 INJECTION INTRAMUSCULAR; INTRAVENOUS at 18:22

## 2021-01-01 RX ADMIN — MORPHINE SULFATE 4 MG: 4 INJECTION, SOLUTION INTRAMUSCULAR; INTRAVENOUS at 09:22

## 2021-01-01 RX ADMIN — Medication: at 18:26

## 2021-01-01 RX ADMIN — METRONIDAZOLE 500 MG: 500 INJECTION, SOLUTION INTRAVENOUS at 15:40

## 2021-01-01 RX ADMIN — MORPHINE SULFATE 2 MG: 2 INJECTION, SOLUTION INTRAMUSCULAR; INTRAVENOUS at 13:18

## 2021-01-01 RX ADMIN — CARVEDILOL 12.5 MG: 12.5 TABLET, FILM COATED ORAL at 00:20

## 2021-01-01 RX ADMIN — MORPHINE SULFATE 4 MG: 4 INJECTION, SOLUTION INTRAMUSCULAR; INTRAVENOUS at 16:15

## 2021-01-01 RX ADMIN — SODIUM CHLORIDE, PRESERVATIVE FREE 0.5 ML: 5 INJECTION INTRAVENOUS at 21:08

## 2021-01-01 RX ADMIN — LORAZEPAM 0.5 MG: 2 INJECTION INTRAMUSCULAR; INTRAVENOUS at 01:49

## 2021-01-01 RX ADMIN — LORAZEPAM 0.5 MG: 2 INJECTION INTRAMUSCULAR; INTRAVENOUS at 10:40

## 2021-01-01 RX ADMIN — POLYVINYL ALCOHOL 2 DROP: 14 SOLUTION/ DROPS OPHTHALMIC at 22:03

## 2021-01-01 RX ADMIN — Medication: at 09:22

## 2021-01-25 NOTE — TELEPHONE ENCOUNTER
Medication Refill Request    Medication:  - rosuvastatin (CRESTOR) 20 MG tablet daily    Pertinent Labs:  Lab Results   Component Value Date    GLUCOSE 114 (H) 11/09/2020    BUN 14 11/09/2020    CREATININE 1.00 11/09/2020    EGFRIFNONA 52 (L) 11/09/2020    BCR 14.0 11/09/2020    K 4.3 11/09/2020    CO2 21.0 (L) 11/09/2020    CALCIUM 9.3 11/09/2020    ALBUMIN 4.00 11/09/2020    ALKPHOS 86 11/09/2020    AST 18 11/09/2020    ALT 9 11/09/2020      Lab Results   Component Value Date    CHLPL 189 09/13/2018    TRIG 266 (H) 09/13/2018    HDL 48 09/13/2018    LDL 88 09/13/2018     Lab Results   Component Value Date    HGBA1C 6.1 (H) 02/22/2016     Lab Results   Component Value Date    WBC 4.53 11/09/2020    HGB 13.5 11/09/2020    HCT 42.0 11/09/2020    .7 (H) 11/09/2020     (L) 11/09/2020     Lab Results   Component Value Date    TSH 0.070 (L) 02/15/2019

## 2021-04-07 NOTE — PROGRESS NOTES
[facility] Cardiology    Patient ID: Sarah Kennedy is a 89 y.o. female.  : 1929   Contact: [phone]    Encounter date: 2021    PCP: Martha Montague PA      Chief complaint:   Chief Complaint   Patient presents with   • Permanent atrial fibrillation (CMS/HCC)     Problem List:  1. Atrial fibrillation:  a. CHADS score = 4.  b. Holter monitor, 2011: Average rate of 78 beats per minute with a minimal rate of 52, maximum rate of 108 and 6 pauses, the longest of which was 2.1 seconds.   c. Chronic Coumadin therapy.  d. Event monitor, 2010: Heart rate measuring  beats per minute. Mild symptoms of lightheadedness not correlating with heart rate.  e. Echocardiogram 2009: Normal EF, mild MR, mildly aortic sclerosis, mild to moderate TR. RVSP of 44.  f. Echocardiogram, 2010: Mild MR, moderate TR, RVSP 42 mmHg. Normal EF.  g. Discontinuation of digoxin, 2014.  h. Holter monitor, 2014, revealing atrial fibrillation with average rate of 66, occasional PVCs, multiple pauses over 2 seconds but all less than 3 seconds.  i. Hospitalization 10/2016 for afib with RVR. Troponin mildly elevated.   2. Coronary Artery Disease:  a. History of myocardial infarction, .  b. Left heart catheterization, , to unknown vessel.  c. Nuclear stress test, 2010: Very small sized defect and mild intensity in apical portion of anterior septal region. EF 68%.  d. Non-ST elevation MI, 2016 with cardiac catheterization by Lobito Steele MD with placement of a drug-eluting stent to the mid-LAD, drug-eluting to the mid-RCA, and a drug-eluting stent to the PDA. The stent in the LAD, 2.5 x 28 mm Xience drug-eluting stent; mid-right, 3.0 x 33 mm Xience drug-eluting stent. PDA, 2.5 x 28 mm Xience drug-eluting stent. EF 30% with anterolateral apical and inferior hypokinesis.   e. Cardiac Nuclear PET 10/11/2016: EF 42%. Positive PET scan for inducible  ischemia versus solomon-infarct ischemia in the mid and distal anterior segment.   f. Cardiac catheterization 10/12/2016: 95% mid segment IntraStent restenosis of the LAD. Patent stents of the RCA with nonocclusive disease of the mid RCA. Severe left regular systolic dysfunction, ejection fraction 25%. Successful PTCA of the LAD with 2.75 mm balloon reducing the 95% stenosis to no significant residual disease.   g. Echocardiogram, 02/15/2019: EF 55%. Mild-to-moderate MR. Moderate TR. Calculated right ventricular systolic pressure from tricuspid regurgitation is 55 mmHg.  3. Presyncope.  a. Carotid duplex 2/15/19:  0-49% bilaterally with antegrade flow noted bilaterally  4. History of transient ischemic attack x2 with last one 1 month ago.  5. Hypertension.  6. Hyperlipidemia.  7. Hypothyroidism.  8. Non-Hodgkin’s lymphoma.  9. Gastroesophageal reflux disease.  10. Glaucoma.  11. Chronic back and left leg pain, pain management per Dr. Lynch.  12. Surgical history:  a. Esophageal dilation.  b. Tonsillectomy.    Allergies   Allergen Reactions   • Atorvastatin Myalgia       Current Medications:    Current Outpatient Medications:   •  carvedilol (COREG) 12.5 MG tablet, Take 12.5 mg by mouth 2 (Two) Times a Day With Meals., Disp: , Rfl:   •  Docusate Sodium (ERNIE-100 PO), Take 100 mg by mouth Daily., Disp: , Rfl:   •  ferrous sulfate 325 (65 FE) MG tablet, Take 325 mg by mouth Daily With Breakfast., Disp: , Rfl:   •  folic acid (FOLVITE) 800 MCG tablet, Take 800 mcg by mouth Daily., Disp: , Rfl:   •  levothyroxine (SYNTHROID, LEVOTHROID) 50 MCG tablet, Take 50 mcg by mouth Daily., Disp: , Rfl:   •  mirtazapine (REMERON) 30 MG tablet, Take 15 mg by mouth Every Night. 1/2 TABLET , Disp: , Rfl:   •  multivitamin with minerals (MULTIVITAMIN ADULT PO), Take 1 tablet by mouth Daily., Disp: , Rfl:   •  rosuvastatin (CRESTOR) 20 MG tablet, TAKE 1 TABLET BY MOUTH EVERY NIGHT. (Patient taking differently: Take 10 mg by mouth Every  "Night.), Disp: 90 tablet, Rfl: 0  •  thiamine (VITAMINE B-1) 100 MG tablet, Take 100 mg by mouth daily., Disp: , Rfl:   •  Xarelto 15 MG tablet, TAKE 1 TABLET BY MOUTH DAILY WITH DINNER. PLEASE CALL TO SCHEDULE AN APPT OR FURTHER REFILLS FROM PCP, Disp: 30 tablet, Rfl: 0    HPI: Sarah Kennedy is a 89 y.o. female who presents today for a follow up of permanent atrial fibrillation, coronary artery disease, and cardiac risk factors. Since last visit, patient has done well overall. She has not had any symptoms for Afib. No chest pain, SOB, PND, orthopnea, ALVARO.  BP controlled. No major bleeding or bruising on Xarelto. No stroke-like symptoms.     The following portions of the patient's history were reviewed and updated as appropriate: allergies, current medications and problem list.    Pertinent positives as listed in the HPI.  All other systems reviewed are negative.       Vitals:    04/07/21 1326   BP: 102/58   BP Location: Right arm   Patient Position: Sitting   Cuff Size: Adult   Pulse: 80   Temp: 97.5 °F (36.4 °C)   SpO2: 96%   Weight: 52.2 kg (115 lb)   Height: 152.4 cm (60\")       Physical Exam:  General: Alert and oriented.  Neck: Jugular venous pressure is within normal limits. Carotids have normal upstrokes without bruits.   Cardiovascular: Heart has a nondisplaced focal PMI. Irregularly irregular rhythm.  No murmur, gallop or rub.  Lungs: Clear, no rales or wheezes. Equal expansion is noted.   Extremities: Show no edema.  Skin: Warm and dry.  Neurologic: Nonfocal.     Diagnostic Data (reviewed with patient):  Lab date: 11/19/2019  • FLP: , , HDL 48, LDL 93    Lab Results   Component Value Date    GLUCOSE 114 (H) 11/09/2020    BUN 14 11/09/2020    CREATININE 1.00 11/09/2020    EGFRIFNONA 52 (L) 11/09/2020    BCR 14.0 11/09/2020     11/09/2020    K 4.3 11/09/2020     11/09/2020    CO2 21.0 (L) 11/09/2020    CALCIUM 9.3 11/09/2020    ALBUMIN 4.00 11/09/2020    ALKPHOS 86 11/09/2020    " AST 18 11/09/2020    ALT 9 11/09/2020      Lab Results   Component Value Date    WBC 4.53 11/09/2020    RBC 4.05 11/09/2020    HGB 13.5 11/09/2020    HCT 42.0 11/09/2020    .7 (H) 11/09/2020     (L) 11/09/2020      Lab Results   Component Value Date    TSH 0.070 (L) 02/15/2019      Procedures    Advance Care Planning   ACP discussion was held with the patient during this visit. Patient does not have an advance directive, declines further assistance.      Assessment:    ICD-10-CM ICD-9-CM   1. Mixed hyperlipidemia  E78.2 272.2   2. Coronary artery disease involving native coronary artery of native heart without angina pectoris  I25.10 414.01   3. Permanent atrial fibrillation (CMS/[facility])  I48.21 427.31         Plan:  1. Continue statin for CAD. Will need to start ASA 81 mg daily for CAD  2. Continue Coreg and Eliquis for Afib.   3. Continue statin for HLD. Will try to get copy of labs from PCP. If no recent FLP, she will need to have labs.   4. Continue all other current medications.  5. F/up in 12 months, sooner if needed.      Electronically signed by LESLY Hickey, 04/07/21, 1:32 PM EDT.    I Jennifer Morgan MD personally performed the services described in this documentation as scribed by the above individual in my presence, and it is both accurate and complete.    Jennifer Morgan MD, [facility]

## 2021-06-16 PROBLEM — R09.89 SUSPECTED CEREBROVASCULAR ACCIDENT (CVA): Status: ACTIVE | Noted: 2021-01-01

## 2021-06-17 PROBLEM — R41.82 ALTERED MENTAL STATUS: Status: ACTIVE | Noted: 2021-01-01

## 2021-06-17 NOTE — CASE MANAGEMENT/SOCIAL WORK
Discharge Planning Assessment  [facility]     Patient Name: Sarah Kennedy  MRN: [id number]  Today's Date: 6/17/2021    Admit Date: 6/16/2021    Discharge Needs Assessment     Row Name 06/17/21 5308       Living Environment    Lives With  child(litzy), adult    Name(s) of Who Lives With Patient  Stephanie Montague, ph [phone]    Current Living Arrangements  home/apartment/condo    Provides Primary Care For  no one, unable/limited ability to care for self    Family Caregiver if Needed  child(litzy), adult    Quality of Family Relationships  helpful;involved;supportive    Able to Return to Prior Arrangements  yes       Resource/Environmental Concerns    Resource/Environmental Concerns  none    Transportation Concerns  car, none       Transition Planning    Patient/Family Anticipates Transition to  home with family    Patient/Family Anticipated Services at Transition  home health care    Transportation Anticipated  family or friend will provide       Discharge Needs Assessment    Readmission Within the Last 30 Days  no previous admission in last 30 days    Equipment Currently Used at Home  walker, rolling    Equipment Needed After Discharge  none    Outpatient/Agency/Support Group Needs  homecare agency    Discharge Facility/Level of Care Needs  home with home health    Provided Post Acute Provider List?  Yes    Post Acute Provider List  Home Health    Provided Post Acute Provider Quality & Resource List?  Yes    Post Acute Provider Quality and Resource List  Home Health    Delivered To  Support Person    Method of Delivery  Telephone        Discharge Plan     Row Name 06/17/21 5365       Plan    Plan  Home with family asssitance and home health, if patient is agreeable    Patient/Family in Agreement with Plan  yes    Plan Comments  Contacted Ms. Kennedy's daughter, Stephanie Montague, by phone, for discharge plannning.  Ms. Kennedy lives with Stephanie in a one story home, one step to enter, in [facility].  Ms. Kennedy has been  evaluated by PT and OT and she is ambulating with a rolling walker.  Stephanie denies any additional DME in the home.  Therapy recommendation is for home with 24/7 assistance.    Ms. Kennedy has never had home health in the past.  She has been to in inpatient rehab several years ago, but Stephanie can't remeber which facility.  Stephanie is currently out of town, but her brother is here in [place] and will be assisting his Mother at home when she is discharged.    Stephanie requested that CM speak to her Mother about home health when her brother is in the room.    CM will follow up with Ms. Kennedy and her son tomorrow morning.    Final Discharge Disposition Code  06 - home with home health care                              Continued Care and Services - Admitted Since 6/16/2021    Coordination has not been started for this encounter.       Expected Discharge Date and Time     Expected Discharge Date Expected Discharge Time    Jun 18, 2021         Demographic Summary     Row Name 06/17/21 1447       General Information    Admission Type  inpatient    Arrived From  home    Reason for Consult  discharge planning    General Information Comments  PCP:  Martha Montague       Contact Information    Permission Granted to Share Info With          Functional Status     Row Name 06/17/21 1448       Functional Status    Usual Activity Tolerance  moderate    Current Activity Tolerance  -- See PT and OT evaluation       Functional Status, IADL    Medications  independent    Meal Preparation  independent    Housekeeping  independent    Laundry  independent    Shopping  independent       Mental Status    General Appearance WDL  WDL        Psychosocial    No documentation.       Abuse/Neglect    No documentation.       Legal    No documentation.       Substance Abuse    No documentation.       Patient Forms    No documentation.           Samara Scott RN

## 2021-06-17 NOTE — CONSULTS
Diabetes education consult noted.  Awaiting A1c for instruction.  Patient doesn't have history of diabetes.  Will follow. Thank you.

## 2021-06-17 NOTE — PROGRESS NOTES
[facility] Medicine Services  PROGRESS NOTE    Patient Name: Sarah Kennedy  : 1929  MRN: [id number]    Date of Admission: 2021  Primary Care Physician: Martha Montague PA    Subjective   Subjective     CC:  Acute onset RUE weakness    HPI:  Doing well this am, sitting up in bed. Son at bedside. Pt states that she just wants to sleep. No other issues overnight.     ROS:  Gen- No fevers, chills  CV- No chest pain, palpitations  Resp- No cough, dyspnea  GI- No N/V/D, abd pain        Objective   Objective     Vital Signs:   Temp:  [97.7 °F (36.5 °C)-98.7 °F (37.1 °C)] 97.7 °F (36.5 °C)  Heart Rate:  [80-95] 86  Resp:  [16-18] 18  BP: (137-171)/(76-99) 158/93  Total (NIH Stroke Scale): 0     Physical Exam:  Constitutional: No acute distress, awake, alert  HENT: NCAT, mucous membranes moist  Respiratory: Clear to auscultation bilaterally, respiratory effort normal   Cardiovascular: RRR, no murmurs, rubs, or gallops  Gastrointestinal: Positive bowel sounds, soft, nontender, nondistended  Musculoskeletal: No bilateral ankle edema  Psychiatric: Appropriate affect, cooperative  Neurologic: Oriented x 3, strength symmetric in all extremities, Cranial Nerves grossly intact to confrontation, speech clear  Skin: No rashes    Results Reviewed:  Results from last 7 days   Lab Units 21   WBC 10*3/mm3 6.39  --   --    HEMOGLOBIN g/dL 12.9  --   --    HEMOGLOBIN, POC g/dL  --   --  13.6   HEMATOCRIT % 39.7  --   --    HEMATOCRIT POC %  --   --  40   PLATELETS 10*3/mm3 138*  --   --    INR   --  3.4*  --      Results from last 7 days   Lab Units 21   SODIUM mmol/L 139  --    POTASSIUM mmol/L 3.7  --    CHLORIDE mmol/L 104  --    CO2 mmol/L 22.0  --    BUN mg/dL 13  --    CREATININE mg/dL 0.91 1.10   GLUCOSE mg/dL 112*  --    CALCIUM mg/dL 9.1  --    ALT (SGPT) U/L 6  --    AST (SGOT) U/L 19  --    TROPONIN T ng/mL <0.010   --      Estimated Creatinine Clearance: 33.4 mL/min (by C-G formula based on SCr of 0.91 mg/dL).    Microbiology Results Abnormal     Procedure Component Value - Date/Time    COVID PRE-OP / PRE-PROCEDURE SCREENING ORDER (NO ISOLATION) - Swab, Nasopharynx [[id number]]  (Normal) Collected: 06/16/21 2243    Lab Status: Final result Specimen: Swab from Nasopharynx Updated: 06/17/21 0009    Narrative:      The following orders were created for panel order COVID PRE-OP / PRE-PROCEDURE SCREENING ORDER (NO ISOLATION) - Swab, Nasopharynx.  Procedure                               Abnormality         Status                     ---------                               -----------         ------                     COVID-19,CEPHEID,EDILSON IN-...[[id number]]  Normal              Final result                 Please view results for these tests on the individual orders.    COVID-19,CEPHEID,EDILSON IN-HOUSE(OR EMERGENT/ADD-ON),NP SWAB IN TRANSPORT MEDIA 3-4 HR TAT - Swab, Nasopharynx [[id number]]  (Normal) Collected: 06/16/21 2243    Lab Status: Final result Specimen: Swab from Nasopharynx Updated: 06/17/21 0009     COVID19 Not Detected    Narrative:      Fact sheet for providers: https://www.fda.gov/media/783096/download     Fact sheet for patients: https://www.fda.gov/media/151087/download  Fact sheet for providers: https://www.fda.gov/media/846484/download     Fact sheet for patients: https://www.fda.gov/media/605028/download          Imaging Results (Last 24 Hours)     Procedure Component Value Units Date/Time    MRI Brain Without Contrast [[id number]] Resulted: 06/17/21 0205     Updated: 06/17/21 0302    XR Chest 1 View [[id number]] Collected: 06/16/21 2243     Updated: 06/16/21 2245    Narrative:      CR Chest 1 Vw    INDICATION:   Strokelike symptoms.     COMPARISON:    11/9/2020    FINDINGS:  Single portable AP view(s) of the chest.  Heart remains enlarged. Left-sided coronary stent is noted. There is atherosclerotic disease in the  aorta. There is mild chronic scarring or atelectasis at the left lung base. Lungs otherwise are clear. No  pneumothorax is seen. Pulmonary vascularity is normal.      Impression:      No acute cardiopulmonary findings.    Signer Name: Eduard Fiore MD   Signed: 6/16/2021 10:43 PM   Workstation Name: [email or web address]    [facility]    CT Angiogram Neck [[id number]] Collected: 06/16/21 2239     Updated: 06/16/21 2241    Narrative:        CTA of the head and neck with AI analysis for LVO    Technique: CT angio head following administration of 100 cc Isovue-370 IV contrast, including coronal and sagittal MIP 3-D reformatted images.     Indication: Stroke, follow up    Comparison: No pertinent prior study    TECHNIQUE:   Radiation dose reduction techniques included automated exposure control or exposure modulation based on body size. Radiation audit for number of CT and nuclear cardiology exams performed in the last year: 1.     Evaluation for a significant carotid arterial stenosis is based on the NASCET criteria.    Findings:      CT angiography neck:     The visualized aortic arch and its major branch vessels demonstrates scattered atherosclerotic calcification and moderate tortuosity. No flow limiting stenosis..    The right carotid bifurcation demonstrates prominent tortuosity. Calcification noted in the proximal right internal carotid artery. There is a stenosis of approximately 40-50% diameter stenosis.    Scattered calcifications are demonstrated in the left bifurcation. Marked tortuosity of the left internal carotid artery. There is minimal calcific plaque in the proximal left internal carotid artery. No significant stenosis demonstrated.    Extracranial vertebral arteries are of normal course and caliber. There is a dominant right vertebral artery. Both vertebral arteries contribute to the basilar artery.      CT angiography head:     The intracranial portions of the internal carotid arteries  demonstrate atherosclerotic calcification. No convincing evidence of a flow-limiting stenosis.    MCA and SHREE vessels are patent bilaterally. There is atherosclerotic irregularity in the A1 segment of the right anterior cerebral artery. Also demonstrated is a moderate stenosis of the M1 segment of the right middle cerebral artery.    There is a symmetric distal runoff intracranially without evidence of aneurysm or flow-limiting stenosis.    Basilar artery is patent. Bilateral posterior cerebral arteries demonstrate symmetric distal runoff without evidence of aneurysm or flow-limiting stenosis.     Major dural venous sinuses show no evidence of filling defect.         Impression:          1.  Prominent calcification in the proximal right internal carotid artery with a stenosis of approximately 40-50% diameter stenosis.    2.  Minimal calcification in the left carotid bifurcation without evidence of a significant stenosis.    3.  Atherosclerotic irregularity of the A1 segment of the right anterior cerebral artery.    4.  Moderate stenosis of the M1 segment of the right middle cerebral artery.    Signer Name: Yasir Couch MD   Signed: 6/16/2021 10:39 PM   Workstation [facility]: [facility]    [facility]    CT Angiogram Head w AI Analysis of LVO [[id number]] Collected: 06/16/21 2239     Updated: 06/16/21 2241    Narrative:        CTA of the head and neck with AI analysis for LVO    Technique: CT angio head following administration of 100 cc Isovue-370 IV contrast, including coronal and sagittal MIP 3-D reformatted images.     Indication: Stroke, follow up    Comparison: No pertinent prior study    TECHNIQUE:   Radiation dose reduction techniques included automated exposure control or exposure modulation based on body size. Radiation audit for number of CT and nuclear cardiology exams performed in the last year: 1.     Evaluation for a significant carotid arterial stenosis is based on the NASCET  criteria.    Findings:      CT angiography neck:     The visualized aortic arch and its major branch vessels demonstrates scattered atherosclerotic calcification and moderate tortuosity. No flow limiting stenosis..    The right carotid bifurcation demonstrates prominent tortuosity. Calcification noted in the proximal right internal carotid artery. There is a stenosis of approximately 40-50% diameter stenosis.    Scattered calcifications are demonstrated in the left bifurcation. Marked tortuosity of the left internal carotid artery. There is minimal calcific plaque in the proximal left internal carotid artery. No significant stenosis demonstrated.    Extracranial vertebral arteries are of normal course and caliber. There is a dominant right vertebral artery. Both vertebral arteries contribute to the basilar artery.      CT angiography head:     The intracranial portions of the internal carotid arteries demonstrate atherosclerotic calcification. No convincing evidence of a flow-limiting stenosis.    MCA and SHREE vessels are patent bilaterally. There is atherosclerotic irregularity in the A1 segment of the right anterior cerebral artery. Also demonstrated is a moderate stenosis of the M1 segment of the right middle cerebral artery.    There is a symmetric distal runoff intracranially without evidence of aneurysm or flow-limiting stenosis.    Basilar artery is patent. Bilateral posterior cerebral arteries demonstrate symmetric distal runoff without evidence of aneurysm or flow-limiting stenosis.     Major dural venous sinuses show no evidence of filling defect.         Impression:          1.  Prominent calcification in the proximal right internal carotid artery with a stenosis of approximately 40-50% diameter stenosis.    2.  Minimal calcification in the left carotid bifurcation without evidence of a significant stenosis.    3.  Atherosclerotic irregularity of the A1 segment of the right anterior cerebral artery.    4.   Moderate stenosis of the M1 segment of the right middle cerebral artery.    Signer Name: Yasir Couch MD   Signed: 6/16/2021 10:39 PM   Workstation [facility]: [facility]    [facility]    CT CEREBRAL PERFUSION WITH & WITHOUT CONTRAST [[id number]] Collected: 06/16/21 2214     Updated: 06/16/21 2217    Narrative:      CT CEREBRAL PERFUSION W WO CONTRAST    HISTORY:   Acute neuro deficit    TECHNIQUE:   Axial CT images of the brain without and with intravenous contrast using cerebral perfusion protocol. Post-processing parametric maps were created using RAPID software and reviewed. Radiation dose reduction techniques included automated exposure control  or exposure modulation based on body size. CT and nuclear cardiology exams in the last 12 months: 1.    COMPARISON:   No pertinent prior study    FINDINGS:   Arterial input function is optimal.     *  Perfusion parameters:    *  Ischemic tissue volume (Tmax > 6 sec.): 0 mL.  *  Infarct core volume (CBF < 30%): 0 mL.  *  Mismatch (penumbra) volume: 0 mL.  *  Mismatch ratio: 0.  *  Location/territory: Not applicable.    COLLATERAL CIRCULATION PARAMETERS:    *  Volume poor collateral perfusion (Tmax > 10 sec.): 0 mL.  *  Hypoperfusion index (Tmax >10 sec./Tmax > 6 sec.): 0.  *  CBV Index (rCBV in Tmax > 6 sec. region): Not applicable.    *  The T max greater than 4 seconds is 22 mL. This finding suggests mild ischemic change in the right occipital lobe and in the left parietal lobe.        Impression:      No evidence of acute infarct.    The T max greater than 4 seconds is 22 mL suggesting mild ischemic change in the right occipital lobe and left parietal lobe.          Signer Name: Yasir Couch MD   Signed: 6/16/2021 10:14 PM   Workstation Name: [facility]    [facility]    CT Head Without Contrast Stroke Protocol [[id number]] Collected: 06/16/21 2207     Updated: 06/16/21 2209    Narrative:      CT Head WO Code  Stroke    HISTORY:   Aphasia and right-sided weakness    TECHNIQUE:   Axial unenhanced head CT. Radiation dose reduction techniques included automated exposure control or exposure modulation based on body size. Count of known CT and cardiac nuc med studies performed in previous 12 months: 1.     Time of scan: 9:37 PM    Report called to [facility] CT at 10:03 PM.    COMPARISON:   November 9, 2020    FINDINGS:   The ventricles are generous in size. Cortical sulci are correspondingly prominent. No extraaxial fluid collections are seen.    Redemonstrated is a densely calcified meningioma in the left frontal area.    No parenchymal or subarachnoid hemorrhage is present. Periventricular hypodensities are demonstrated which are nonspecific but likely represent white matter microvascular change in a patient of this age. No CT evidence of mass or acute infarct.    The mastoid air cells are clear. The visualized paranasal sinuses are clear.  Orbital structures demonstrate no acute findings.      Impression:      Impression:  1. No clearly acute intracranial pathology demonstrated.  2. Generalized cerebral atrophy and nonspecific periventricular hypodensities which are thought to represent white matter microvascular change.  3. Calcified meningioma in the left frontal area. No significant interval change from study of November 9, 2020    Signer Name: Yasir Couch MD   Signed: 6/16/2021 10:07 PM   Workstation Name: [id number]    [facility]          Results for orders placed during the hospital encounter of 02/14/19    Adult Transthoracic Echo Complete W/ Cont if Necessary Per Protocol    Interpretation Summary  · Estimated EF = 55%.  · Left ventricular systolic function is normal.  · Mild-to-moderate mitral valve regurgitation is present  · Moderate tricuspid valve regurgitation is present.  · Calculated right ventricular systolic pressure from tricuspid regurgitation is 55 mmHg.      I have reviewed  the medications:  Scheduled Meds:aspirin, 81 mg, Oral, Daily   Or  aspirin, 300 mg, Rectal, Daily  atorvastatin, 80 mg, Oral, Nightly  cefTRIAXone, 1 g, Intravenous, Q24H  docusate sodium, 100 mg, Oral, Daily  ferrous sulfate, 325 mg, Oral, Daily With Breakfast  folic acid, 800 mcg, Oral, Daily  levothyroxine, 50 mcg, Oral, Q AM  [START ON 6/18/2021] rivaroxaban, 15 mg, Oral, Daily With Dinner  sodium chloride, 10 mL, Intravenous, Q12H  sodium chloride, 10 mL, Intravenous, Q12H  thiamine, 100 mg, Oral, Daily      Continuous Infusions:   PRN Meds:.aspirin  •  melatonin  •  sodium chloride  •  sodium chloride  •  sodium chloride    Assessment/Plan   Assessment & Plan     Active Hospital Problems    Diagnosis  POA   • **Suspected cerebrovascular accident (CVA) [R09.89]  Yes   • Altered mental status [R41.82]  Yes   • Systolic dysfunction [I51.9]  Yes   • Coronary artery disease [I25.10]  Yes   • Hypothyroidism [E03.9]  Yes   • Hyperlipidemia [E78.5]  Yes   • Hx-TIA (transient ischemic attack) [Z86.73]  Not Applicable   • HTN (hypertension) [I10]  Yes   • GERD (gastroesophageal reflux disease) [K21.9]  Yes   • Permanent atrial fibrillation (CMS/HCC) [I48.21]  Yes      Resolved Hospital Problems   No resolved problems to display.        Brief Hospital Course to date:  Sarah Kennedy is a 89 y.o. female  w/ a hx of Atrial Fibrillation, CHF, CAD, HTN, HLD, hypothyroidism, GERD, remote TIA, dementia who presented to the ED w/ c/o altered mental status and right sided weakness.     Suspected CVA   --sudden onset confusion, aphasia and RUE weakness  -- CT Head, CTA Head/neck and CT cerebral perfusion unremarkable  -- MRI Brain PENDING final read, but prelim looks okay  -- ECHO PENDING    -- Stroke Navigator following  -- Neurology consulted  -- pt,ot, speech and case mgt consult   -- ASA, high dose statin   -- LDL 84, tsh wnl, hem a1c PENDING  -- EKG c/w Afib; continue Xarelto   --permissive HTN, resume home meds when  cleared by Neuro       Pyuria, suspected UTI (POA)  -- Daughter reports history of similar presentation with previously being diagnosed with UTI  -- Empirically treat with ceftriaxone  -- Urine culture PENDING        Atrial fibrillation  CAD s/p stents in remote past ()  HTN  HLD  Hx of TIA  -- EKG c/w Afib   -- routine Coreg and low dose statin held for now; high dose statin per Stroke   -- routine Xarelto continued      H/o systolic CHF  --EF of 25% on LHC in 2016  -- ECHO 2019; EF 55% w/ normal LVSF, RVSP 55mmg, mild-moderate mitral valve regurg and moderate tricuspid regurg  -- repeat ECHO PENDING      Hypothyroidism   -tsh wnl  -continue synthroid      Alcohol use  -pt reports 1-2 drinks daily; also only oriented to name/ only  -per hx- daughter monitors and doesn't let her drink anymore  -ethanol level mildly elevated, so likely did have a drink on night of admission  -CIWA protocol  -continue routine thiamine and folic acid            DVT prophylaxis:  Medical and mechanical DVT prophylaxis orders are present.       Disposition: I expect the patient to be discharged home tomorrow    CODE STATUS:   Code Status and Medical Interventions:   Ordered at: 21 0254     Limited Support to NOT Include:    Intubation     Code Status:    No CPR     Medical Interventions (Level of Support Prior to Arrest):    Limited       Maddie Sharif MD  21

## 2021-06-17 NOTE — THERAPY EVALUATION
Patient Name: Sarah Kennedy  : 1929    MRN: [id number]                              Today's Date: 2021       Admit Date: 2021    Visit Dx:     ICD-10-CM ICD-9-CM   1. Altered mental status, unspecified altered mental status type  R41.82 780.97   2. Right arm weakness  R29.898 729.89   3. Cognitive communication deficit  R41.841 799.52     Patient Active Problem List   Diagnosis   • Permanent atrial fibrillation (CMS/HCC)   • NSTEMI (non-ST elevated myocardial infarction) (CMS/HCC)   • Subtherapeutic international normalized ratio (INR)   • Hx-TIA (transient ischemic attack)   • HTN (hypertension)   • Hyperlipidemia   • Hypothyroidism   • GERD (gastroesophageal reflux disease)   • H/O non-Hodgkin's lymphoma   • Coronary artery disease   • Syncope   • Transient ischemic attack   • Glaucoma   • Chronic back pain   • Hypotension   • BRYSON (acute kidney injury) (CMS/HCC)   • Systolic dysfunction   • Suspected cerebrovascular accident (CVA)   • Altered mental status     Past Medical History:   Diagnosis Date   • A-fib (CMS/HCC)    • Cancer (CMS/HCC)    • Chronic back pain 10/17/2016    Chronic back and left leg pain, pain management per Dr. Lynch.   • Coronary artery disease 10/17/2016    History of myocardial infarction, . Left heart catheterization, , to unknown vessel. Nuclear stress test, 2010:  Very small sized defect and mild intensity in apical portion of anterior septal region.  EF 68%. Non-ST elevation MI, 2016 with cardiac catheterization by Lobito Steele MD with placement of a drug-eluting stent to the mid-LAD, drug-eluting to the mid-RCA, and a drug-eluting stent to the PDA.  The stent in the LAD, 2.5 x 28 mm Xience drug-eluting stent; mid-right, 3.0 x 33 mm Xience drug-eluting stent.  PDA, 2.5 x 28 mm Xience drug-eluting stent.  EF 30% with anterolateral apical and inferior hypokinesis.     • Dementia (CMS/HCC)    • Disease of thyroid gland    • Heart attack (CMS/HCC)     • History of UTI    • Hyperlipidemia    • Hypertension    • Lymphoma (CMS/HCC)    • Lymphoma (CMS/HCC)     Non-Hodgkin’s lymphoma   • Stroke (CMS/HCC)    • Syncope 10/17/2016    Presyncope      Past Surgical History:   Procedure Laterality Date   • CARDIAC CATHETERIZATION N/A 10/12/2016    Procedure: Left Heart Cath;  Surgeon: Nikia Chambers MD;  Location:  [facility];  Service:    • ESOPHAGEAL DILATATION     • NASAL SEPTAL RECONSTRUCTION     • TONSILLECTOMY       General Information     Row Name 06/17/21 1312          OT Time and Intention    Document Type  evaluation  -HK     Mode of Treatment  occupational therapy  -     Row Name 06/17/21 1312          General Information    Patient Profile Reviewed  yes  -HK     Prior Level of Function  independent:;all household mobility;gait;transfer;bed mobility;ADL's  -HK     Existing Precautions/Restrictions  fall;other (see comments) confusion  -HK     Barriers to Rehab  cognitive status  -     Row Name 06/17/21 1312          Living Environment    Lives With  child(litzy), adult  -     Row Name 06/17/21 1312          Home Main Entrance    Number of Stairs, Main Entrance  one  -HK     Stair Railings, Main Entrance  none  -HK     Row Name 06/17/21 1312          Stairs Within Home, Primary    Number of Stairs, Within Home, Primary  none  -HK     Stair Railings, Within Home, Primary  none  -HK     Stairs Comment, Within Home, Primary  Pt reports tub shower however is poor historian.  -     Row Name 06/17/21 1312          Cognition    Orientation Status (Cognition)  oriented x 3 did not know the year  -     Row Name 06/17/21 1312          Safety Issues, Functional Mobility    Safety Issues Affecting Function (Mobility)  safety precautions follow-through/compliance;safety precaution awareness;insight into deficits/self-awareness;awareness of need for assistance;judgment;sequencing abilities;problem-solving  -     Impairments Affecting Function  (Mobility)  cognition;strength  -     Cognitive Impairments, Mobility Safety/Performance  attention;awareness, need for assistance;insight into deficits/self-awareness;judgment;problem-solving/reasoning;safety precaution awareness;safety precaution follow-through;sequencing abilities  -       User Key  (r) = Recorded By, (t) = Taken By, (c) = Cosigned By    Initials Name Provider Type     Jazmyne Johnson, OT Occupational Therapist          Mobility/ADL's     Row Name 06/17/21 1322          Bed Mobility    Bed Mobility  scooting/bridging;supine-sit  -HK     Scooting/Bridging [place] (Bed Mobility)  verbal cues;contact guard  -     Supine-Sit [place] (Bed Mobility)  minimum assist (75% patient effort);1 person assist;verbal cues  -     Bed Mobility, Safety Issues  decreased use of arms for pushing/pulling;impaired trunk control for bed mobility;decreased use of legs for bridging/pushing  -     Assistive Device (Bed Mobility)  bed rails;head of bed elevated  -     Comment (Bed Mobility)  Pt required Haley to sit trunk upright at EOB.  -     Row Name 06/17/21 1322          Transfers    Transfers  bed-chair transfer;sit-stand transfer  -     Bed-Chair [place] (Transfers)  contact guard;verbal cues  -     Assistive Device (Bed-Chair Transfers)  other (see comments) UE support  -     Sit-Stand [place] (Transfers)  contact guard;verbal cues  -     Row Name 06/17/21 1322          Sit-Stand Transfer    Assistive Device (Sit-Stand Transfers)  other (see comments) L UE support  -     Row Name 06/17/21 1322          Functional Mobility    Functional Mobility- Ind. Level  not tested  -     Row Name 06/17/21 1322          Activities of Daily Living    BADL Assessment/Intervention  lower body dressing  -     Row Name 06/17/21 1322          Lower Body Dressing Assessment/Training    [place] Level (Lower Body Dressing)  doff;don;socks;minimum assist (75% patient effort);verbal cues   -HK     Position (Lower Body Dressing)  edge of bed sitting  -HK     Comment (Lower Body Dressing)  Haley to don L sock.  -HK       User Key  (r) = Recorded By, (t) = Taken By, (c) = Cosigned By    Initials Name Provider Type    HK Jazmyne Johnson, ANUJ Occupational Therapist        Obj/Interventions     Row Name 06/17/21 1326          Sensory Assessment (Somatosensory)    Sensory Assessment (Somatosensory)  sensation intact Pt denies all numbess and tingling.  -HK     Row Name 06/17/21 1326          Vision Assessment/Intervention    Visual Impairment/Limitations  WFL  -HK     Row Name 06/17/21 1326          Range of Motion Comprehensive    General Range of Motion  no range of motion deficits identified  -HK     Comment, General Range of Motion  B UE WNL  -HK     Row Name 06/17/21 1326          Strength Comprehensive (MMT)    General Manual Muscle Testing (MMT) Assessment  no strength deficits identified  -HK     Comment, General Manual Muscle Testing (MMT) Assessment  B UE WFL for age  -HK     Row Name 06/17/21 1326          Balance    Balance Assessment  sitting static balance;sitting dynamic balance;standing static balance;standing dynamic balance  -HK     Static Sitting Balance  WFL;unsupported;sitting, edge of bed  -HK     Dynamic Sitting Balance  mild impairment;unsupported;sitting, edge of bed  -HK     Static Standing Balance  WFL;supported;standing  -HK     Dynamic Standing Balance  mild impairment;supported;standing  -HK       User Key  (r) = Recorded By, (t) = Taken By, (c) = Cosigned By    Initials Name Provider Type    HK Jzamyne Johnson OT Occupational Therapist        Goals/Plan     Row Name 06/17/21 1338          Bed Mobility Goal 1 (OT)    Activity/Assistive Device (Bed Mobility Goal 1, OT)  sit to supine/supine to sit;scooting  -HK     [place] Level/Cues Needed (Bed Mobility Goal 1, OT)  contact guard assist;verbal cues required  -HK     Time Frame (Bed Mobility Goal 1, OT)  by discharge;long term  goal (LTG)  -HK     Progress/Outcomes (Bed Mobility Goal 1, OT)  goal ongoing  -     Row Name 06/17/21 1338          Transfer Goal 1 (OT)    Activity/Assistive Device (Transfer Goal 1, OT)  sit-to-stand/stand-to-sit;toilet  -HK     [place] Level/Cues Needed (Transfer Goal 1, OT)  contact guard assist;verbal cues required  -HK     Time Frame (Transfer Goal 1, OT)  by discharge;long term goal (LTG)  -HK     Progress/Outcome (Transfer Goal 1, OT)  goal ongoing  -     Row Name 06/17/21 1338          Toileting Goal 1 (OT)    Activity/Device (Toileting Goal 1, OT)  adjust/manage clothing;perform perineal hygiene  -HK     [place] Level/Cues Needed (Toileting Goal 1, OT)  minimum assist (75% or more patient effort);verbal cues required  -HK     Time Frame (Toileting Goal 1, OT)  by discharge;long term goal (LTG)  -HK     Progress/Outcome (Toileting Goal 1, OT)  goal ongoing  [facility]     Row Name 06/17/21 1338          Grooming Goal 1 (OT)    Activity/Device (Grooming Goal 1, OT)  hair care;oral care;wash face, hands  -HK     [place] (Grooming Goal 1, OT)  minimum assist (75% or more patient effort);verbal cues required  -HK     Time Frame (Grooming Goal 1, OT)  by discharge;long term goal (LTG)  -HK     Progress/Outcome (Grooming Goal 1, OT)  goal ongoing  [facility] Name 06/17/21 1338          Therapy Assessment/Plan (OT)    Planned Therapy Interventions (OT)  adaptive equipment training;BADL retraining;occupation/activity based interventions;transfer/mobility retraining  -HK       User Key  (r) = Recorded By, (t) = Taken By, (c) = Cosigned By    Initials Name Provider Type    Jazmyne Ibarra, OT Occupational Therapist        Clinical Impression     Row Name 06/17/21 8698          Pain Scale: FACES Pre/Post-Treatment    Pain: FACES Scale, Pretreatment  0-->no hurt  -HK     Posttreatment Pain Rating  0-->no hurt  -HK     Row Name 06/17/21 5682          Plan of Care Review    Plan of Care Reviewed With   patient  -HK     Progress  improving  -HK     Outcome Summary  OT eval complete. Pt easily irritated with questions about PLOF. Pt completes bed mobility with Haley and transfers with CGA and UE support. Pt dof/don socks with Haley. B UE ROM WNL and strength WFL for age. Recommend d/c home with 24/7 assist.  -     Row Name 06/17/21 0049          Therapy Assessment/Plan (OT)    Patient/Family Therapy Goal Statement (OT)  Pt would like to improve and return home.  -HK     Rehab Potential (OT)  good, to achieve stated therapy goals  -HK     Criteria for Skilled Therapeutic Interventions Met (OT)  yes;skilled treatment is necessary  -HK     Therapy Frequency (OT)  daily  -     Row Name 06/17/21 4669          Therapy Plan Review/Discharge Plan (OT)    Anticipated Discharge Disposition (OT)  home with 24/7 care  -HK     Row Name 06/17/21 4305          Vital Signs    Pre Systolic BP Rehab  -- WFL for eval; VSS; BP monitored.  -HK     O2 Delivery Pre Treatment  room air  -HK     O2 Delivery Intra Treatment  room air  -HK     O2 Delivery Post Treatment  room air  -HK     Pre Patient Position  Supine  -HK     Intra Patient Position  Standing  -HK     Post Patient Position  Sitting  -HK     Row Name 06/17/21 3007          Positioning and Restraints    Pre-Treatment Position  in bed  -HK     Post Treatment Position  chair  -HK     In Chair  notified nsg;reclined;call light within reach;exit alarm on;encouraged to call for assist;waffle cushion  -HK       User Key  (r) = Recorded By, (t) = Taken By, (c) = Cosigned By    Initials Name Provider Type     Jazmyne Johnson, OT Occupational Therapist        Outcome Measures     Row Name 06/17/21 5482          How much help from another is currently needed...    Putting on and taking off regular lower body clothing?  3  -HK     Bathing (including washing, rinsing, and drying)  3  -HK     Toileting (which includes using toilet bed pan or urinal)  3  -HK     Putting on and taking off  regular upper body clothing  4  -HK     Taking care of personal grooming (such as brushing teeth)  3  -HK     Eating meals  3  -HK     AM-PAC 6 Clicks Score (OT)  19  -     Row Name 06/17/21 0904          How much help from another person do you currently need...    Turning from your back to your side while in flat bed without using bedrails?  3  -WB     Moving from lying on back to sitting on the side of a flat bed without bedrails?  3  -WB     Moving to and from a bed to a chair (including a wheelchair)?  3  -WB     Standing up from a chair using your arms (e.g., wheelchair, bedside chair)?  3  -WB     Climbing 3-5 steps with a railing?  3  -WB     To walk in hospital room?  3  -WB     AM-PAC 6 Clicks Score (PT)  18  -     Row Name 06/17/21 1339          Modified [place] Scale    Modified [place] Scale  1 - No significant disability despite symptoms.  Able to carry out all usual duties and activities.  -     Row Name 06/17/21 1339          Functional Assessment    Outcome Measure Options  AM-PAC 6 Clicks Daily Activity (OT);Modified [place]oca  -       User Key  (r) = Recorded By, (t) = Taken By, (c) = Cosigned By    Initials Name Provider Type     Jazmyne Johnson, OT Occupational Therapist     Cierra Baker, RN Registered Nurse        Occupational Therapy Education                 Title: PT OT SLP Therapies (In Progress)     Topic: Occupational Therapy (In Progress)     Point: ADL training (Done)     Description:   Instruct learner(s) on proper safety adaptation and remediation techniques during self care or transfers.   Instruct in proper use of assistive devices.              Learning Progress Summary           Patient Acceptance, E,TB,D, VU,NR by  at 6/17/2021 1340                   Point: Home exercise program (Not Started)     Description:   Instruct learner(s) on appropriate technique for monitoring, assisting and/or progressing therapeutic exercises/activities.              Learner Progress:  Not  documented in this visit.          Point: Precautions (Done)     Description:   Instruct learner(s) on prescribed precautions during self-care and functional transfers.              Learning Progress Summary           Patient Acceptance, E,TB,D, VU,NR by  at 6/17/2021 1340                   Point: Body mechanics (Done)     Description:   Instruct learner(s) on proper positioning and spine alignment during self-care, functional mobility activities and/or exercises.              Learning Progress Summary           Patient Acceptance, E,TB,D, VU,NR by  at 6/17/2021 1340                               User Key     Initials Effective Dates Name Provider Type [facility] 06/16/21 -  Jazmyne Johnson OT Occupational Therapist OT              OT Recommendation and Plan  Planned Therapy Interventions (OT): adaptive equipment training, BADL retraining, occupation/activity based interventions, transfer/mobility retraining  Therapy Frequency (OT): daily  Plan of Care Review  Plan of Care Reviewed With: patient  Progress: improving  Outcome Summary: OT eval complete. Pt easily irritated with questions about PLOF. Pt completes bed mobility with Haley and transfers with CGA and UE support. Pt dof/don socks with Haley. B UE ROM WNL and strength WFL for age. Recommend d/c home with 24/7 assist.     Time Calculation:   Time Calculation- OT     Row Name 06/17/21 1058             Time Calculation- OT    OT Start Time  1058  -HK      OT Received On  06/17/21  -      OT Goal Re-Cert Due Date  06/27/21  -         Untimed Charges    OT Eval/Re-eval Minutes  59  -HK         Total Minutes    Untimed Charges Total Minutes  59  -HK       Total Minutes  59  -HK        User Key  (r) = Recorded By, (t) = Taken By, (c) = Cosigned By    Initials Name Provider Type     Jazmyne Johnson OT Occupational Therapist        Therapy Charges for Today     Code Description Service Date Service Provider Modifiers Qty    [id number] HC OT EVAL LOW  COMPLEXITY 4 6/17/2021 Jazmyne Johnson, OT GO 1               Jazmyne Johnson, OT  6/17/2021

## 2021-06-17 NOTE — PLAN OF CARE
Goal Outcome Evaluation:  Plan of Care Reviewed With: patient        Progress: improving  Outcome Summary: OT eval complete. Pt easily irritated with questions about PLOF. Pt completes bed mobility with Haley and transfers with CGA and UE support. Pt dof/don socks with Haley. B UE ROM WNL and strength WFL for age. Recommend d/c home with 24/7 assist.

## 2021-06-17 NOTE — H&P
[facility] Medicine Services  HISTORY AND PHYSICAL    Patient Name: Sarah Kennedy  : 1929  MRN: [id number]  Primary Care Physician: Martha Montague PA  Date of admission: 2021    Subjective   Subjective     Chief Complaint:  Altered mental status, right sided weakness     HPI:  Sarah Kennedy is a 89 y.o. female w/ a hx of Atrial Fibrillation, CHF, CAD, HTN, HLD, hypothyroidism, GERD, remote TIA, dementia who presented to the ED w/ c/o altered mental status and right sided weakness.   Per ED notes, pt was eating dinner w/ her son (~1850) when she suddenly stopped eating and appeared confused. Pt would not answer questions or follow commands. EMS was called. Upon EMS arrival, it was noted that pt had RUE weakness. Pt brought to ED for further evaluation.   At time of exam, pt is oriented to name/ only. Pt unable to recall year, president, place or details regarding HPI. She reports that she remembers waking up in an ambulance but does not recall how she got there. She reports that she feels @ baseline. Per ED notes and EMS, pt w/ prior episodes of sudden onset confusion and pre-syncope (similar to tonight) related to hypotension. EMS noted a SBP in the 130's tonight.   Pt evaluated in the ED. CT Head without acute findings; MRI Brain pending. Pt admitted to the hospital medicine service for further evaluation.     COVID Details:    Symptoms:    [x] NONE [] Fever []  Cough [] Shortness of breath [] Change in taste/smell      The patient has a COVID HM Topic on their chart, and they are fully vaccinated.    Review of Systems   Unable to perform ROS: Mental status change      Personal History     Past Medical History:   Diagnosis Date   • A-fib (CMS/HCC)    • Cancer (CMS/HCC)    • Chronic back pain 10/17/2016    Chronic back and left leg pain, pain management per Dr. Lynch.   • Coronary artery disease 10/17/2016    History of myocardial infarction, 1984. Left heart  catheterization, 2007, to unknown vessel. Nuclear stress test, 03/22/2010:  Very small sized defect and mild intensity in apical portion of anterior septal region.  EF 68%. Non-ST elevation MI, 02/22/2016 with cardiac catheterization by Lobito Steele MD with placement of a drug-eluting stent to the mid-LAD, drug-eluting to the mid-RCA, and a drug-eluting stent to the PDA.  The stent in the LAD, 2.5 x 28 mm Xience drug-eluting stent; mid-right, 3.0 x 33 mm Xience drug-eluting stent.  PDA, 2.5 x 28 mm Xience drug-eluting stent.  EF 30% with anterolateral apical and inferior hypokinesis.     • Dementia (CMS/HCC)    • Disease of thyroid gland    • Heart attack (CMS/HCC)    • History of UTI    • Hyperlipidemia    • Hypertension    • Lymphoma (CMS/HCC)    • Lymphoma (CMS/HCC)     Non-Hodgkin’s lymphoma   • Stroke (CMS/HCC)    • Syncope 10/17/2016    Presyncope        Past Surgical History:   Procedure Laterality Date   • CARDIAC CATHETERIZATION N/A 10/12/2016    Procedure: Left Heart Cath;  Surgeon: Nikia Chambers MD;  Location: [facility] CATH INVASIVE LOCATION;  Service:    • ESOPHAGEAL DILATATION     • NASAL SEPTAL RECONSTRUCTION     • TONSILLECTOMY         Family History: family history includes Heart attack in her father; Heart disease in her father, maternal grandfather, and maternal grandmother; No Known Problems in her mother. Otherwise pertinent FHx was reviewed and unremarkable.     Social History:  reports that she has never smoked. She has never used smokeless tobacco. She reports current alcohol use of about 1.0 - 2.0 standard drinks of alcohol per week. She reports that she does not use drugs.  Social History     Social History Narrative    Lives in an assisted living facility       Medications:  Docusate Sodium, carvedilol, ferrous sulfate, folic acid, levothyroxine, mirtazapine, multivitamin with minerals, rivaroxaban, rosuvastatin, and thiamine    Allergies   Allergen Reactions   • Atorvastatin Myalgia              Objective   Objective     Vital Signs:   Temp:  [98.7 °F (37.1 °C)] 98.7 °F (37.1 °C)  Heart Rate:  [83-95] 92  Resp:  [16] 16  BP: (170-171)/(94-99) 171/99    Physical Exam     Constitutional: Awake, alert; non-toxic appearing   Eyes: PERRLA, sclerae anicteric, no conjunctival injection  HENT: NCAT, mucous membranes dry   Neck: Supple, no thyromegaly, no lymphadenopathy, trachea midline  Respiratory: Clear to auscultation bilaterally, nonlabored respirations   Cardiovascular: RRR, no murmurs, rubs, or gallops, no peripheral edema   Gastrointestinal: Positive bowel sounds, soft, nontender, nondistended  Musculoskeletal: Normal ROM bilaterally   Psychiatric: Appropriate affect, cooperative  Neurologic: Oriented to name/ only; otherwise confused, strength symmetric in all extremities, Cranial Nerves grossly intact to confrontation, speech clear  Skin: No rashes, lesions or wounds     Results Reviewed:  I have personally reviewed most recent indicated data and agree with findings including:  [x]  Laboratory  [x]  Radiology  []  EKG/Telemetry  []  Pathology  []  Cardiac/Vascular Studies  []  Old records  []  Other:  Most pertinent findings include:    LAB RESULTS:      Lab 21   WBC  --  6.39  --   --    HEMOGLOBIN  --  12.9  --   --    HEMOGLOBIN, POC  --   --   --  13.6   HEMATOCRIT  --  39.7  --   --    HEMATOCRIT POC  --   --   --  40   PLATELETS  --  138*  --   --    NEUTROS ABS  --  4.20  --   --    IMMATURE GRANS (ABS)  --  0.03  --   --    LYMPHS ABS  --  1.39  --   --    MONOS ABS  --  0.51  --   --    EOS ABS  --  0.24  --   --    MCV  --  102.8*  --   --    PROTIME  --   --  38.2*  --    APTT 40.5*  --   --   --          Lab 21   CREATININE 1.10             Lab 21   PROTIME 38.2*   INR 3.4*                 Lab 21   PH, ARTERIAL 7.40     Brief Urine Lab Results  (Last result in the past 365 days)      Color    Clarity   Blood   Leuk Est   Nitrite   Protein   CREAT   Urine HCG        06/16/21 2246 Yellow Clear Negative Large (3+) Negative Negative             Microbiology Results (last 10 days)     ** No results found for the last 240 hours. **          CT Angiogram Neck    Result Date: 6/16/2021  CTA of the head and neck with AI analysis for LVO Technique: CT angio head following administration of 100 cc Isovue-370 IV contrast, including coronal and sagittal MIP 3-D reformatted images. Indication: Stroke, follow up Comparison: No pertinent prior study TECHNIQUE: Radiation dose reduction techniques included automated exposure control or exposure modulation based on body size. Radiation audit for number of CT and nuclear cardiology exams performed in the last year: 1.  Evaluation for a significant carotid arterial stenosis is based on the NASCET criteria. Findings:   CT angiography neck: The visualized aortic arch and its major branch vessels demonstrates scattered atherosclerotic calcification and moderate tortuosity. No flow limiting stenosis.. The right carotid bifurcation demonstrates prominent tortuosity. Calcification noted in the proximal right internal carotid artery. There is a stenosis of approximately 40-50% diameter stenosis. Scattered calcifications are demonstrated in the left bifurcation. Marked tortuosity of the left internal carotid artery. There is minimal calcific plaque in the proximal left internal carotid artery. No significant stenosis demonstrated. Extracranial vertebral arteries are of normal course and caliber. There is a dominant right vertebral artery. Both vertebral arteries contribute to the basilar artery.   CT angiography head: The intracranial portions of the internal carotid arteries demonstrate atherosclerotic calcification. No convincing evidence of a flow-limiting stenosis. MCA and SHREE vessels are patent bilaterally. There is atherosclerotic irregularity in the A1 segment of the right  anterior cerebral artery. Also demonstrated is a moderate stenosis of the M1 segment of the right middle cerebral artery. There is a symmetric distal runoff intracranially without evidence of aneurysm or flow-limiting stenosis. Basilar artery is patent. Bilateral posterior cerebral arteries demonstrate symmetric distal runoff without evidence of aneurysm or flow-limiting stenosis. Major dural venous sinuses show no evidence of filling defect.       Impression:   1.  Prominent calcification in the proximal right internal carotid artery with a stenosis of approximately 40-50% diameter stenosis. 2.  Minimal calcification in the left carotid bifurcation without evidence of a significant stenosis. 3.  Atherosclerotic irregularity of the A1 segment of the right anterior cerebral artery. 4.  Moderate stenosis of the M1 segment of the right middle cerebral artery. Signer Name: Yasir Couch MD  Signed: 6/16/2021 10:39 PM  Workstation Name: [email or web address]  [facility]    XR Chest 1 View    Result Date: 6/16/2021  CR Chest 1 Vw INDICATION: Strokelike symptoms. COMPARISON:  11/9/2020 FINDINGS: Single portable AP view(s) of the chest.  Heart remains enlarged. Left-sided coronary stent is noted. There is atherosclerotic disease in the aorta. There is mild chronic scarring or atelectasis at the left lung base. Lungs otherwise are clear. No pneumothorax is seen. Pulmonary vascularity is normal.     Impression: No acute cardiopulmonary findings. Signer Name: Eduard Fiore MD  Signed: 6/16/2021 10:43 PM  Workstation Name: [facility]  [facility]    CT Head Without Contrast Stroke Protocol    Result Date: 6/16/2021  CT Head WO Code Stroke HISTORY: Aphasia and right-sided weakness TECHNIQUE: Axial unenhanced head CT. Radiation dose reduction techniques included automated exposure control or exposure modulation based on body size. Count of known CT and cardiac nuc med studies performed in  previous 12 months: 1. Time of scan: 9:37 PM Report called to [facility] CT at 10:03 PM. COMPARISON: November 9, 2020 FINDINGS: The ventricles are generous in size. Cortical sulci are correspondingly prominent. No extraaxial fluid collections are seen. Redemonstrated is a densely calcified meningioma in the left frontal area. No parenchymal or subarachnoid hemorrhage is present. Periventricular hypodensities are demonstrated which are nonspecific but likely represent white matter microvascular change in a patient of this age. No CT evidence of mass or acute infarct. The mastoid air cells are clear. The visualized paranasal sinuses are clear. Orbital structures demonstrate no acute findings.     Impression: Impression: 1. No clearly acute intracranial pathology demonstrated. 2. Generalized cerebral atrophy and nonspecific periventricular hypodensities which are thought to represent white matter microvascular change. 3. Calcified meningioma in the left frontal area. No significant interval change from study of November 9, 2020 Signer Name: Yasir Couch MD  Signed: 6/16/2021 10:07 PM  Workstation Name: [email or web address]  [facility]    CT Angiogram Head w AI Analysis of LVO    Result Date: 6/16/2021  CTA of the head and neck with AI analysis for LVO Technique: CT angio head following administration of 100 cc Isovue-370 IV contrast, including coronal and sagittal MIP 3-D reformatted images. Indication: Stroke, follow up Comparison: No pertinent prior study TECHNIQUE: Radiation dose reduction techniques included automated exposure control or exposure modulation based on body size. Radiation audit for number of CT and nuclear cardiology exams performed in the last year: 1.  Evaluation for a significant carotid arterial stenosis is based on the NASCET criteria. Findings:   CT angiography neck: The visualized aortic arch and its major branch vessels demonstrates scattered atherosclerotic calcification and  moderate tortuosity. No flow limiting stenosis.. The right carotid bifurcation demonstrates prominent tortuosity. Calcification noted in the proximal right internal carotid artery. There is a stenosis of approximately 40-50% diameter stenosis. Scattered calcifications are demonstrated in the left bifurcation. Marked tortuosity of the left internal carotid artery. There is minimal calcific plaque in the proximal left internal carotid artery. No significant stenosis demonstrated. Extracranial vertebral arteries are of normal course and caliber. There is a dominant right vertebral artery. Both vertebral arteries contribute to the basilar artery.   CT angiography head: The intracranial portions of the internal carotid arteries demonstrate atherosclerotic calcification. No convincing evidence of a flow-limiting stenosis. MCA and SHREE vessels are patent bilaterally. There is atherosclerotic irregularity in the A1 segment of the right anterior cerebral artery. Also demonstrated is a moderate stenosis of the M1 segment of the right middle cerebral artery. There is a symmetric distal runoff intracranially without evidence of aneurysm or flow-limiting stenosis. Basilar artery is patent. Bilateral posterior cerebral arteries demonstrate symmetric distal runoff without evidence of aneurysm or flow-limiting stenosis. Major dural venous sinuses show no evidence of filling defect.       Impression:   1.  Prominent calcification in the proximal right internal carotid artery with a stenosis of approximately 40-50% diameter stenosis. 2.  Minimal calcification in the left carotid bifurcation without evidence of a significant stenosis. 3.  Atherosclerotic irregularity of the A1 segment of the right anterior cerebral artery. 4.  Moderate stenosis of the M1 segment of the right middle cerebral artery. Signer Name: Yasir Couch MD  Signed: 6/16/2021 10:39 PM  Workstation Name: [id number]  [facility]  CEREBRAL PERFUSION WITH & WITHOUT CONTRAST    Result Date: 6/16/2021  CT CEREBRAL PERFUSION W WO CONTRAST HISTORY: Acute neuro deficit TECHNIQUE: Axial CT images of the brain without and with intravenous contrast using cerebral perfusion protocol. Post-processing parametric maps were created using RAPID software and reviewed. Radiation dose reduction techniques included automated exposure control or exposure modulation based on body size. CT and nuclear cardiology exams in the last 12 months: 1. COMPARISON: No pertinent prior study FINDINGS: Arterial input function is optimal. *  Perfusion parameters: *  Ischemic tissue volume (Tmax > 6 sec.): 0 mL. *  Infarct core volume (CBF < 30%): 0 mL. *  Mismatch (penumbra) volume: 0 mL. *  Mismatch ratio: 0. *  Location/territory: Not applicable. COLLATERAL CIRCULATION PARAMETERS: *  Volume poor collateral perfusion (Tmax > 10 sec.): 0 mL. *  Hypoperfusion index (Tmax >10 sec./Tmax > 6 sec.): 0. *  CBV Index (rCBV in Tmax > 6 sec. region): Not applicable. *  The T max greater than 4 seconds is 22 mL. This finding suggests mild ischemic change in the right occipital lobe and in the left parietal lobe.     Impression: No evidence of acute infarct. The T max greater than 4 seconds is 22 mL suggesting mild ischemic change in the right occipital lobe and left parietal lobe. Signer Name: Yasir Couch MD  Signed: 6/16/2021 10:14 PM  Workstation Name: [id number]  [facility]      Results for orders placed during the hospital encounter of 02/14/19    Adult Transthoracic Echo Complete W/ Cont if Necessary Per Protocol    Interpretation Summary  · Estimated EF = 55%.  · Left ventricular systolic function is normal.  · Mild-to-moderate mitral valve regurgitation is present  · Moderate tricuspid valve regurgitation is present.  · Calculated right ventricular systolic pressure from tricuspid regurgitation is 55 mmHg.      Assessment/Plan   Assessment & Plan        Suspected cerebrovascular accident (CVA)    Permanent atrial fibrillation (CMS/HCC)    Hx-TIA (transient ischemic attack)    HTN (hypertension)    Hyperlipidemia    Hypothyroidism    GERD (gastroesophageal reflux disease)    Coronary artery disease    Systolic dysfunction    Sarah Kennedy is a 89 y.o. female w/ a hx of Atrial Fibrillation, CHF, CAD, HTN, HLD, hypothyroidism, GERD, remote TIA, dementia who presented to the ED w/ c/o altered mental status and right sided weakness.    **Suspected CVA   -onset 1850 tonight- sudden onset confusion, aphasia and RUE weakness  -CT Head, CTA Head/neck and CT cerebral perfusion   -MRI Brain   -ECHO   -Stroke Navigator following  -Neurology consult in am   -pt,ot, speech and case mgt consult   -ASA, high dose statin   -lipid panel, tsh, hem a1c w/ am labs   -bedside dysphagia screen   -EKG c/w Afib; continue Xarelto   -neuro checks and stoke scale per protocol  -fall precautions    **Pyuria, suspected UTI,POA  -Daughter reports history of similar presentation with previously being diagnosed with UTI  -Empirically treat with ceftriaxone  --Urine culture      **Atrial fibrillation  **CAD  **HTN  **HLD  **Hx of TIA  -EKG c/w Afib   -routine Coreg and low dose statin held for now; high dose statin per Stroke   -routine Xarelto continued     **Hx of CHF  -ECHO 2019; EF 55% w/ normal LVSF, RVSP 55mmg, mild-moderate mitral valve regurg and moderate tricuspid regurg  -repeat ECHO in am     **Hypothyroidism   -tsh pending   -continue synthroid     **??Alcohol use  -pt reports 1-2 drinks daily; also only oriented to name/ only  -per hx- daughter monitors and doesn't let her drink anymore??  -ethanol level pending   -initiate CIWA PRN  -continue routine thiamine and folic acid     DVT prophylaxis:      CODE STATUS:  DNR/DNI  Code Status and Medical Interventions:   Ordered at: 21 2839     Code Status:    CPR     Medical Interventions (Level of Support Prior to Arrest):     Full     This note has been completed as part of a split-shared workflow.     Signature: Electronically signed by LESLY Mondragon, 06/16/21, 11:30 PM EDT.        Attending   Admission Attestation       I have seen and examined the patient, performing an independent face-to-face diagnostic evaluation with plan of care reviewed and developed with the advanced practice clinician (APC).      Brief Summary Statement:   Sarah Kennedy is a 89 y.o. female with PMH HTN, alheimers dementia, cad, hld, remote history of TIA hypothyroidism, meds, who presents with ams and possible RUE drift.    History is limited secondary to patient's dementia.  Reportedly patient was eating dinner with her son around 1850 tonight when she had altered mental status suddenly and stopped eating and appeared to be confused.    Patient would not answer to questioning or follow commands.  EMS was called at that time.  They possibly appreciated some right upper extremity weakness with drift.  Symptoms seem to mostly resolve while in the ER.  Patient is oriented to name and date of birth.  Unable to recall year, however this seems to be her baseline according to her daughter.  She does remember waking up in the ambulance, however is unclear about the events preceding.  Denies any chest pain or pressure, presyncope, loss of consciousness, headache, shortness of air, nausea, diaphoresis, or any other symptoms.    Remainder of detailed HPI is as noted by APC and has been reviewed and/or edited by me for completeness.    Attending Physical Exam:  Constitutional: Awake, alert  Eyes: PERRLA, sclerae anicteric, no conjunctival injection  HENT: NCAT, mucous membranes moist  Neck: Supple, no thyromegaly, no lymphadenopathy, trachea midline  Respiratory: Clear to auscultation bilaterally, nonlabored respirations   Cardiovascular: Regular rate, irregularly irregular rhythm, no murmurs, rubs, or gallops, palpable pedal pulses  bilaterally  Gastrointestinal: Positive bowel sounds, soft, nontender, nondistended  Musculoskeletal: No bilateral ankle edema, no clubbing or cyanosis to extremities  Psychiatric: Appropriate affect, cooperative  Neurologic: Oriented to name and  which is slightly off from her baseline but has seemed more disoriented last several months off and on, strength symmetric in all extremities, Cranial Nerves grossly intact to confrontation, speech clear  Skin: No rashes      Brief Assessment/Plan :  See detailed assessment and plan developed with APC which I have reviewed and/or edited for completeness.        Admission Status: I believe that this patient meets INPATIENT status due to stroke, ams.  I feel patient’s risk for adverse outcomes and need for care warrant INPATIENT evaluation and I predict the patient’s care encounter to likely last beyond 2 midnights.        Livan Lin DO  21

## 2021-06-17 NOTE — THERAPY TREATMENT NOTE
Patient Name: Sarah Kennedy  : 1929    MRN: [id number]                              Today's Date: 2021       Admit Date: 2021    Visit Dx:     ICD-10-CM ICD-9-CM   1. Altered mental status, unspecified altered mental status type  R41.82 780.97   2. Right arm weakness  R29.898 729.89   3. Cognitive communication deficit  R41.841 799.52     Patient Active Problem List   Diagnosis   • Permanent atrial fibrillation (CMS/HCC)   • NSTEMI (non-ST elevated myocardial infarction) (CMS/HCC)   • Subtherapeutic international normalized ratio (INR)   • Hx-TIA (transient ischemic attack)   • HTN (hypertension)   • Hyperlipidemia   • Hypothyroidism   • GERD (gastroesophageal reflux disease)   • H/O non-Hodgkin's lymphoma   • Coronary artery disease   • Syncope   • Transient ischemic attack   • Glaucoma   • Chronic back pain   • Hypotension   • BRYSON (acute kidney injury) (CMS/HCC)   • Systolic dysfunction   • Suspected cerebrovascular accident (CVA)   • Altered mental status     Past Medical History:   Diagnosis Date   • A-fib (CMS/HCC)    • Cancer (CMS/HCC)    • Chronic back pain 10/17/2016    Chronic back and left leg pain, pain management per Dr. Lynch.   • Coronary artery disease 10/17/2016    History of myocardial infarction, . Left heart catheterization, , to unknown vessel. Nuclear stress test, 2010:  Very small sized defect and mild intensity in apical portion of anterior septal region.  EF 68%. Non-ST elevation MI, 2016 with cardiac catheterization by Lobito Steele MD with placement of a drug-eluting stent to the mid-LAD, drug-eluting to the mid-RCA, and a drug-eluting stent to the PDA.  The stent in the LAD, 2.5 x 28 mm Xience drug-eluting stent; mid-right, 3.0 x 33 mm Xience drug-eluting stent.  PDA, 2.5 x 28 mm Xience drug-eluting stent.  EF 30% with anterolateral apical and inferior hypokinesis.     • Dementia (CMS/HCC)    • Disease of thyroid gland    • Heart attack (CMS/HCC)     • History of UTI    • Hyperlipidemia    • Hypertension    • Lymphoma (CMS/HCC)    • Lymphoma (CMS/HCC)     Non-Hodgkin’s lymphoma   • Stroke (CMS/HCC)    • Syncope 10/17/2016    Presyncope      Past Surgical History:   Procedure Laterality Date   • CARDIAC CATHETERIZATION N/A 10/12/2016    Procedure: Left Heart Cath;  Surgeon: Nikia Chambers MD;  Location:  [facility];  Service:    • ESOPHAGEAL DILATATION     • NASAL SEPTAL RECONSTRUCTION     • TONSILLECTOMY       General Information     [facility] Name 06/17/21 1339          Physical Therapy Time and Intention    Document Type  evaluation  -     Mode of Treatment  physical therapy  -     Row Name 06/17/21 1339          General Information    Patient Profile Reviewed  yes  -     Prior Level of Function  independent:;all household mobility;gait;transfer;bed mobility;ADL's;dependent:;cooking  -     Existing Precautions/Restrictions  fall;other (see comments) confusion, Alzheimer's  -     Barriers to Rehab  cognitive status  -     Row Name 06/17/21 1339          Living Environment    Lives With  child(litzy), adult  -[facility] Name 06/17/21 1339          Home Main Entrance    Number of Stairs, Main Entrance  one  -     Stair Railings, Main Entrance  none  -     Row Name 06/17/21 1339          Stairs Within Home, Primary    Number of Stairs, Within Home, Primary  none  -     Row Name 06/17/21 1339          Cognition    Orientation Status (Cognition)  oriented to;person;place;disoriented to;situation  -     Row Name 06/17/21 1337          Safety Issues, Functional Mobility    Safety Issues Affecting Function (Mobility)  awareness of need for assistance;insight into deficits/self-awareness;positioning of assistive device;problem-solving;safety precaution awareness;safety precautions follow-through/compliance;sequencing abilities  -     Impairments Affecting Function (Mobility)  cognition;strength;balance  -     Cognitive Impairments,  Mobility Safety/Performance  awareness, need for assistance;insight into deficits/self-awareness;judgment;problem-solving/reasoning;safety precaution awareness;safety precaution follow-through;sequencing abilities  -     Comment, Safety Issues/Impairments (Mobility)  Veers to left with RW during gait.  -       User Key  (r) = Recorded By, (t) = Taken By, (c) = Cosigned By    Initials Name Provider Type     Martin Falk, PT Physical Therapist        Mobility     Row Name 06/17/21 1341          Bed Mobility    Bed Mobility  sit-supine  -     Sit-Supine [place] (Bed Mobility)  supervision;verbal cues  -     Assistive Device (Bed Mobility)  bed rails;head of bed elevated  -     Comment (Bed Mobility)  VCs for sequencing back into bed, no assist needed with trunk or BLE.  -     Row Name 06/17/21 1341          Transfers    Comment (Transfers)  VCs for safe hand placement with use of RW as pt tends to pull on RW to stand.  -[facility] Name 06/17/21 1341          Bed-Chair Transfer    Bed-Chair [place] (Transfers)  contact guard;verbal cues  -     Assistive Device (Bed-Chair Transfers)  walker, front-wheeled  -     Row Name 06/17/21 1341          Sit-Stand Transfer    Sit-Stand [place] (Transfers)  contact guard;verbal cues  -     Assistive Device (Sit-Stand Transfers)  walker, front-wheeled  [facility] Name 06/17/21 1341          Gait/Stairs (Locomotion)    [place] Level (Gait)  minimum assist (75% patient effort);verbal cues;nonverbal cues (demo/gesture)  -     Assistive Device (Gait)  walker, front-wheeled  -     Distance in Feet (Gait)  200  -     Deviations/Abnormal Patterns (Gait)  escobar decreased;gait speed decreased;stride length decreased;weight shifting decreased  -     Bilateral Gait Deviations  weight shift ability decreased  -     [place] Level (Stairs)  not tested  -     Comment (Gait/Stairs)  Pt ambulated with step through pattern and required min  A with cues to steering walker in middle of hallway. She has a tendency to veer towards her left.  -       User Key  (r) = Recorded By, (t) = Taken By, (c) = Cosigned By    Initials Name Provider Type     Martin Falk, PT Physical Therapist        Obj/Interventions     [facility] Name 06/17/21 1346          Range of Motion Comprehensive    General Range of Motion  bilateral lower extremity ROM WFL  -JH     Row Name 06/17/21 1346          Strength Comprehensive (MMT)    General Manual Muscle Testing (MMT) Assessment  lower extremity strength deficits identified  -     Comment, General Manual Muscle Testing (MMT) Assessment  BLE 4/5 grossly  -[facility] Name 06/17/21 1346          Balance    Balance Assessment  sitting static balance;sitting dynamic balance;standing static balance;standing dynamic balance  -     Static Sitting Balance  WFL;unsupported;sitting in chair  -     Dynamic Sitting Balance  WFL;unsupported;sitting in chair  -     Static Standing Balance  mild impairment;supported  -     Dynamic Standing Balance  mild impairment;supported  -     Balance Interventions  sitting;standing;sit to stand;supported;static;dynamic;minimal challenge  -     Comment, Balance  up with RW and CGA, no LOB noted  -JH     Row Name 06/17/21 1346          Sensory Assessment (Somatosensory)    Sensory Assessment (Somatosensory)  LE sensation intact  -       User Key  (r) = Recorded By, (t) = Taken By, (c) = Cosigned By    Initials Name Provider Type     Martin Falk, PT Physical Therapist        Goals/Plan     [facility] Name 06/17/21 1350          Bed Mobility Goal 1 (PT)    Activity/Assistive Device (Bed Mobility Goal 1, PT)  bed mobility activities, all  -     [place] Level/Cues Needed (Bed Mobility Goal 1, PT)  independent  -     Time Frame (Bed Mobility Goal 1, PT)  short term goal (STG);5 days  -[facility] Name 06/17/21 1350          Transfer Goal 1 (PT)    Activity/Assistive Device (Transfer Goal 1,  PT)  sit-to-stand/stand-to-sit;walker, rolling  -     [place] Level/Cues Needed (Transfer Goal 1, PT)  independent  -     Time Frame (Transfer Goal 1, PT)  short term goal (STG);5 days  -[facility] Name 06/17/21 1350          Gait Training Goal 1 (PT)    Activity/Assistive Device (Gait Training Goal 1, PT)  gait (walking locomotion);assistive device use;decrease fall risk;improve balance and speed;increase endurance/gait distance;walker, rolling  -     [place] Level (Gait Training Goal 1, PT)  contact guard assist;1 person assist  -     Distance (Gait Training Goal 1, PT)  300 feet  -     Time Frame (Gait Training Goal 1, PT)  long term goal (LTG);1 week  -       User Key  (r) = Recorded By, (t) = Taken By, (c) = Cosigned By    Initials Name Provider Type     Martin Falk, PT Physical Therapist        Clinical Impression     [facility] Name 06/17/21 1347          Pain    Additional Documentation  Pain Scale: Numbers Pre/Post-Treatment (Group)  -JH     Row Name 06/17/21 1346          Pain Scale: Numbers Pre/Post-Treatment    Pretreatment Pain Rating  0/10 - no pain  -     Posttreatment Pain Rating  0/10 - no pain  -     Pre/Posttreatment Pain Comment  denied  -[facility] Name 06/17/21 1345          Plan of Care Review    Plan of Care Reviewed With  patient  -     Progress  no change PT eval  -     Outcome Summary  PT eval complete. Pt presents with declined functional mobility warranting IPPT to promote return to PLOF. Pt ambulated 200 feet with RW and min A requiring assist/cues as she veers toward her left frequently. Performed bed mobility with supervision. Recommend d/c home with 24/7 assist as long as she has help from family.  -     Row Name 06/17/21 8073          Therapy Assessment/Plan (PT)    Patient/Family Therapy Goals Statement (PT)  return home  -     Rehab Potential (PT)  good, to achieve stated therapy goals  -     Criteria for Skilled Interventions Met (PT)  yes;meets  criteria;skilled treatment is necessary  -     Predicted Duration of Therapy Intervention (PT)  1 week  -     Row Name 06/17/21 1346          Vital Signs    Pre Systolic BP Rehab  153  -     Pre Treatment Diastolic BP  89  -     Pretreatment Heart Rate (beats/min)  73  -     Posttreatment Heart Rate (beats/min)  80  -     Pre SpO2 (%)  97  -     O2 Delivery Pre Treatment  room air  -     O2 Delivery Intra Treatment  room air  -     Post SpO2 (%)  100  -     O2 Delivery Post Treatment  room air  -     Pre Patient Position  Supine  -     Intra Patient Position  Standing  -     Post Patient Position  Sitting  -     Row Name 06/17/21 1346          Positioning and Restraints    Pre-Treatment Position  sitting in chair/recliner  -     Post Treatment Position  bed  -     In Bed  notified nsg;supine;call light within reach;encouraged to call for assist;exit alarm on;side rails up x3  -       User Key  (r) = Recorded By, (t) = Taken By, (c) = Cosigned By    Initials Name Provider Type     Martin Falk, PT Physical Therapist        Outcome Measures     Row Name 06/17/21 1350 06/17/21 0904       How much help from another person do you currently need...    Turning from your back to your side while in flat bed without using bedrails?  4  -  3  -WB    Moving from lying on back to sitting on the side of a flat bed without bedrails?  4  -  3  -WB    Moving to and from a bed to a chair (including a wheelchair)?  3  -  3  -WB    Standing up from a chair using your arms (e.g., wheelchair, bedside chair)?  3  -  3  -WB    Climbing 3-5 steps with a railing?  3  -  3  -WB    To walk in hospital room?  3  -  3  -WB    AM-PAC 6 Clicks Score (PT)  20  -  18  -WB    Row Name 06/17/21 1350 06/17/21 1339       Modified [place] Scale    Modified [place] Scale  1 - No significant disability despite symptoms.  Able to carry out all usual duties and activities.  -  1 - No significant disability  despite symptoms.  Able to carry out all usual duties and activities.  -    Row Name 06/17/21 1350 06/17/21 2929       Functional Assessment    Outcome Measure Options  AM-PAC 6 Clicks Basic Mobility (PT);Selina Srivastava  AM-PAC 6 Clicks Daily Activity (OT);Selina Wright  [facility]      User Key  (r) = Recorded By, (t) = Taken By, (c) = Cosigned By    Initials Name Provider Type     Jazmyne Johnson, OT Occupational Therapist    Cierra Conteh, RN Registered Nurse    Martin Curtis, PT Physical Therapist        Physical Therapy Education                 Title: PT OT SLP Therapies (In Progress)     Topic: Physical Therapy (Done)     Point: Mobility training (Done)     Learning Progress Summary           Patient Acceptance, E,TB, VU by  at 6/17/2021 1351    Comment: Edu on Pt services, POC, d/c planning, and safe sequencing with all mobility                   Point: Home exercise program (Done)     Learning Progress Summary           Patient Acceptance, E,TB, VU by  at 6/17/2021 1351    Comment: Edu on Pt services, POC, d/c planning, and safe sequencing with all mobility                   Point: Body mechanics (Done)     Learning Progress Summary           Patient Acceptance, E,TB, VU by  at 6/17/2021 1351    Comment: Edu on Pt services, POC, d/c planning, and safe sequencing with all mobility                               User Key     Initials Effective Dates Name Provider Type [facility] 09/22/20 -  Martin Falk, [place] Physical Therapist PT              PT Recommendation and Plan  Planned Therapy Interventions (PT): balance training, bed mobility training, gait training, home exercise program, motor coordination training, neuromuscular re-education, patient/family education, postural re-education, ROM (range of motion), stair training, strengthening, stretching, transfer training  Plan of Care Reviewed With: patient  Progress: no change (PT eval)  Outcome Summary: PT eval complete. Pt presents  with declined functional mobility warranting IPPT to promote return to PLOF. Pt ambulated 200 feet with RW and min A requiring assist/cues as she veers toward her left frequently. Performed bed mobility with supervision. Recommend d/c home with 24/7 assist as long as she has help from family.     Time Calculation:   PT Charges     Row Name 06/17/21 1351             Time Calculation    Start Time  1300  -JH      PT Received On  06/17/21  -      PT Goal Re-Cert Due Date  06/27/21  -         Time Calculation- PT    Total Timed Code Minutes- PT  10 minute(s)  -         Timed Charges    11342 - Gait Training Minutes   10  -JH         Untimed Charges    PT Eval/Re-eval Minutes  51  -JH         Total Minutes    Timed Charges Total Minutes  10  -JH      Untimed Charges Total Minutes  51  -JH       Total Minutes  61  -JH        User Key  (r) = Recorded By, (t) = Taken By, (c) = Cosigned By    Initials Name Provider Type    Martin Curtis, PT Physical Therapist        Therapy Charges for Today     Code Description Service Date Service Provider Modifiers Qty    [id number] HC GAIT TRAINING EA 15 MIN 6/17/2021 Martin Falk, PT GP 1    [id number] HC PT EVAL LOW COMPLEXITY 4 6/17/2021 Martin Falk, PT GP 1          PT G-Codes  Outcome Measure Options: AM-PAC 6 Clicks Basic Mobility (PT), Modified [place]  AM-PAC 6 Clicks Score (PT): 20  AM-PAC 6 Clicks Score (OT): 19  Modified [place] Scale: 1 - No significant disability despite symptoms.  Able to carry out all usual duties and activities.    Martin Falk PT  6/17/2021

## 2021-06-17 NOTE — THERAPY EVALUATION
Acute Care - Speech Language Pathology Initial Evaluation  [facility]   Cognitive-Communication Evaluation       Patient Name: Sarah Kennedy  : 1929  MRN: [id number]  Today's Date: 2021               Admit Date: 2021     Visit Dx:    ICD-10-CM ICD-9-CM   1. Altered mental status, unspecified altered mental status type  R41.82 780.97   2. Right arm weakness  R29.898 729.89   3. Cognitive communication deficit  R41.841 799.52     Patient Active Problem List   Diagnosis   • Permanent atrial fibrillation (CMS/HCC)   • NSTEMI (non-ST elevated myocardial infarction) (CMS/HCC)   • Subtherapeutic international normalized ratio (INR)   • Hx-TIA (transient ischemic attack)   • HTN (hypertension)   • Hyperlipidemia   • Hypothyroidism   • GERD (gastroesophageal reflux disease)   • H/O non-Hodgkin's lymphoma   • Coronary artery disease   • Syncope   • Transient ischemic attack   • Glaucoma   • Chronic back pain   • Hypotension   • BRYSON (acute kidney injury) (CMS/[facility])   • Systolic dysfunction   • Suspected cerebrovascular accident (CVA)   • Altered mental status     Past Medical History:   Diagnosis Date   • A-fib (CMS/HCC)    • Cancer (CMS/HCC)    • Chronic back pain 10/17/2016    Chronic back and left leg pain, pain management per Dr. Lynch.   • Coronary artery disease 10/17/2016    History of myocardial infarction, . Left heart catheterization, , to unknown vessel. Nuclear stress test, 2010:  Very small sized defect and mild intensity in apical portion of anterior septal region.  EF 68%. Non-ST elevation MI, 2016 with cardiac catheterization by Lobito Steele MD with placement of a drug-eluting stent to the mid-LAD, drug-eluting to the mid-RCA, and a drug-eluting stent to the PDA.  The stent in the LAD, 2.5 x 28 mm Xience drug-eluting stent; mid-right, 3.0 x 33 mm Xience drug-eluting stent.  PDA, 2.5 x 28 mm Xience drug-eluting stent.  EF 30% with anterolateral apical and inferior  hypokinesis.     • Dementia (CMS/HCC)    • Disease of thyroid gland    • Heart attack (CMS/HCC)    • History of UTI    • Hyperlipidemia    • Hypertension    • Lymphoma (CMS/HCC)    • Lymphoma (CMS/HCC)     Non-Hodgkin’s lymphoma   • Stroke (CMS/HCC)    • Syncope 10/17/2016    Presyncope      Past Surgical History:   Procedure Laterality Date   • CARDIAC CATHETERIZATION N/A 10/12/2016    Procedure: Left Heart Cath;  Surgeon: Nikia Chambers MD;  Location: [facility] CATH INVASIVE LOCATION;  Service:    • ESOPHAGEAL DILATATION     • NASAL SEPTAL RECONSTRUCTION     • TONSILLECTOMY          SLP EVALUATION (last 72 hours)      SLP SLC Evaluation     Row Name 06/17/21 2332                   Communication Assessment/Intervention    Document Type  evaluation  -EN        Subjective Information  no complaints  -EN        Patient Observations  alert;cooperative;agree to therapy  -EN        Patient/Family/Caregiver Comments/Observations  son present   -EN        Care Plan Review  evaluation/treatment results reviewed;care plan/treatment goals reviewed;risks/benefits reviewed;current/potential barriers reviewed;patient/other agree to care plan  -EN        Care Plan Review, Other Participant(s)  son  -EN        Patient Effort  adequate  -EN        Symptoms Noted During/After Treatment  none  -EN           General Information    Patient Profile Reviewed  yes  -EN        Pertinent History Of Current Problem  Pt adm w/ suspected CVA and AMS. Hx of a-fib, TIA, HTN, GERD, BRYSON, non-Hodgkin's lymphoma, heart attack, Alzheimer's, HLD, CHF, CAD.   -EN        Precautions/Limitations, Vision  WFL;for purposes of eval  -EN        Precautions/Limitations, Hearing  hearing impairment, bilaterally  -EN        Prior Level of Function-Communication  cognitive-linguistic impairment;other (see comments) unsure of baseline   -EN        Plans/Goals Discussed with  patient;family;agreed upon  -EN        Barriers to Rehab  cognitive status  -EN         Patient's Goals for Discharge  return to home  -EN        Family Goals for Discharge  family did not state  -EN           Pain    Additional Documentation  Pain Scale: FACES Pre/Post-Treatment (Group)  -EN           Pain Scale: FACES Pre/Post-Treatment    Pain: FACES Scale, Pretreatment  0-->no hurt  -EN        Posttreatment Pain Rating  0-->no hurt  -EN           Comprehension Assessment/Intervention    Comprehension Assessment/Intervention  Auditory Comprehension  -EN           Auditory Comprehension Assessment/Intervention    Auditory Comprehension (Communication)  mild impairment  -EN        Narrative Discourse  simple paragraph level;complex paragraph level;conversational level;mild impairment;other (see comments) hearing impairment could be impacting.   -EN        Auditory Comprehension Communication, Comment  Pt w/ mild comprehension defiicits during conversation. Unsure of baseline as son at bedside does not typically care for pt. Daughter (out of town) normally is caregiver. Son suspects baseline.   -EN           Expression Assessment/Intervention    Expression Assessment/Intervention  verbal expression  -EN           Verbal Expression Assessment/Intervention    Verbal Expression  mild impairment  -EN        Repetition  WFL;words  -EN        Responsive Naming  mild impairment;delayed responses  -EN        Confrontational Naming  WFL;high frequency  -EN        Spontaneous/Functional Words  WFL  -EN        Sentence Formulation  WFL  -EN        Conversational Discourse/Fluency  WFL  -EN        Verbal Expression, Comment  Occasional word finding deficits w/ responsive/divergent naming however could be impacted by cognitive status.   -EN           Oral Motor Structure and Function    Oral Motor Structure and Function  WFL  -EN           Oral Musculature and Cranial Nerve Assessment    Oral Motor General Assessment  WFL  -EN           Motor Speech Assessment/Intervention    Motor Speech Function  WFL  -EN            Cursory Voice Assessment/Intervention    Quality and Resonance (Voice)  WFL  -EN           Cognitive Assessment Intervention- SLP    Cognitive Function (Cognition)  mild impairment  -EN        Orientation Status (Cognition)  WFL;person;place;awareness of basic personal information;time  -EN        Memory (Cognitive)  mild impairment;delayed;other (see comments) ? baseline level of function   -EN        Attention (Cognitive)  selective;sustained;WFL  -EN        Thought Organization (Cognitive)  concrete divergent;drawing conclusions;mild impairment  -EN        Reasoning (Cognitive)  analogies;absurdities;mild impairment  -EN        Problem Solving (Cognitive)  WFL  -EN        Executive Function (Cognition)  WFL  -EN        Pragmatics (Communication)  WFL  -EN        Right Hemisphere Function  WFL  -EN        Cognition, Comment  Pt w/ mild cognitive deficits. Given Alzheimer's dx suspect some deficits are baseline however unable to confidently confirm 2' son at bedside reporting he is not primary caregiver and is not around pt all the time. Reported that he suspects she is at her baseline.   -EN           SLP Clinical Impressions    SLP Diagnosis  Mild cognitive communication deficit  -EN        Rehab Potential/Prognosis  good  -EN        SLC Criteria for Skilled Therapy Interventions Met  yes  -EN        Functional Impact  functional impact in ADLs;functional impact in social situations;difficulty communicating wants, needs;difficulty communicating in an emergency;difficulty in expressing complex messages  -EN        Plan for Continued Treatment (SLP)  F/u for cognitive communication in therapy. Suspect pt is at baseline level of functioning for cog-comm skills however needs confirmation as son at bedside unsure as he lives out of town.   -EN           Recommendations    Therapy Frequency (SLP SLC)  5 days per week  -EN        Predicted Duration Therapy Intervention (Days)  until discharge  -EN        Anticipated  Discharge Disposition (SLP)  unknown;anticipate therapy at next level of care  -EN          User Key  (r) = Recorded By, (t) = Taken By, (c) = Cosigned By    Initials Name Effective Dates    EN Livier Pabon, MS, CFY-SLP 05/20/21 -        EDUCATION  The patient has been educated in the following areas:   Cognitive Impairment Communication Impairment.    SLP Recommendation and Plan  SLP Diagnosis: Mild cognitive communication deficit        SLC Criteria for Skilled Therapy Interventions Met: yes  Anticipated Discharge Disposition (SLP): unknown, anticipate therapy at next level of care        Predicted Duration Therapy Intervention (Days): until discharge     Plan for Continued Treatment (SLP): F/u for cognitive communication in therapy. Suspect pt is at baseline level of functioning for cog-comm skills however needs confirmation as son at bedside unsure as he lives out of town.         SLP GOALS     Row Name 06/17/21 0930             Comprehend Narrative Discourse Goal 1 (SLP)    Improve Comprehension of Narrative Discourse Goal 1 (SLP)  respond appropriately to simple conversational statements;respond appropriately to abstract conversational statements;respond appropriately to simple conversational questions;90%;with minimal cues (75-90%)  -EN      Time Frame (Comprehend Narrative Discourse Goal 1, SLP)  short term goal (STG)  -EN         Word Retrieval Skills Goal 1 (SLP)    Improve Word Retrieval Skills By Goal 1 (SLP)  responsive naming task;simple;completing open ended structured sentence;completing open ended unstructured sentence;completing a divergent task;completing a convergent task;90%;with minimal cues (75-90%)  -EN      Time Frame (Word Retrieval Goal 1, SLP)  short term goal (STG)  -EN         Memory Skills Goal 1 (SLP)    Improve Memory Skills Through Goal 1 (SLP)  recall details of the day;use memory strategies;recalling related word lists immediately;recalling unrelated word lists  immediately;90%;with minimal cues (75-90%)  -EN      Time Frame (Memory Skills Goal 1, SLP)  short term goal (STG)  -EN         Organizational Skills Goal 1 (SLP)    Improve Thought Organization Through Goal 1 (SLP)  completing a divergent naming task;generating a list of items in a category;naming similarities and differences;drawing conclusions;completing a verbal sequencing task;90%;with minimal cues (75-90%)  -EN      Time Frame (Thought Organization Skills Goal 1, SLP)  short term goal (STG)  -EN         Reasoning Goal 1 (SLP)    Improve Reasoning Through Goal 1 (SLP)  complete basic reasoning task;complete analogies;identify absurdities;90%;with minimal cues (75-90%)  -EN      Time Frame (Reasoning Goal 1, SLP)  short term goal (STG)  -EN         Additional Goal 1 (SLP)    Additional Goal 1, SLP  LTG: Pt will improve cognitive communication in order to be able to better participate in conversations and care at this level.   -EN      Time Frame (Additional Goal 1, SLP)  by discharge  -EN        User Key  (r) = Recorded By, (t) = Taken By, (c) = Cosigned By    Initials Name Provider Type    EN Livier Pabon MS, CFY-SLP Speech and Language Pathologist        Time Calculation:     Time Calculation- SLP     Row Name 06/17/21 1010             Time Calculation- SLP    SLP Start Time  0930  -EN      SLP Received On  06/17/21  -EN         Untimed Charges    SLP Eval/Re-eval   ST Eval Speech and Production w/ Language - [id number]  -EN      [id number] Eval Speech and Production w/ Language Minutes  38  -EN         Total Minutes    Untimed Charges Total Minutes  38  -EN       Total Minutes  38  -EN        User Key  (r) = Recorded By, (t) = Taken By, (c) = Cosigned By    Initials Name Provider Type    Livier Schumacher MS, CFY-SLP Speech and Language Pathologist          Therapy Charges for Today     Code Description Service Date Service Provider Modifiers Qty    [id number] HC ST EVAL SPEECH AND PROD W LANG  3 6/17/2021  Livier Pabon MS, CFY-SLP GN 1            Patient was not wearing a face mask and did not exhibit coughing during this therapy encounter.  Procedure performed was aerosolizing, involved close contact (within 6 feet for at least 15 minutes or longer), and did not involve contact with infectious secretions or specimens.  Therapist used appropriate personal protective equipment including gloves, standard procedure mask and eye protection.  Appropriate PPE was worn during the entire therapy session.  Hand hygiene was completed before and after therapy session.         Livier Pabon MS, CFY-SLP  6/17/2021

## 2021-06-17 NOTE — ED PROVIDER NOTES
EMERGENCY DEPARTMENT ENCOUNTER    Pt Name: Sarah Kennedy  MRN: [id number]  Pt :   1929  Room Number:    Date of encounter:  2021  PCP: Martha Montague PA  ED Provider: Jacob Laird MD    Historian: Patient, son, paramedics      HPI:  Chief Complaint: Altered mental status, right-sided weakness        Context: Sarah Kennedy is a 89 y.o. female who presents to the ED c/o sudden onset of altered mental status at 1850 this evening.  The patient was eating dinner when she suddenly stopped and looked confused.  She would not answer questions or follow commands.  The patient was staying with her son and he called 911 after calling his sister for advice.  Paramedics arrived and found the patient to not be hypotensive with systolic blood pressure running mostly in the 130s including initial blood pressure.  The patient was somewhat somnolent and demonstrated right arm drift/weakness which they felt was significant.  Per paramedic report the patient has had hypotensive related altered mental status episodes in the past.  He believes she has had a stroke in the past but is uncertain about this.        PAST MEDICAL HISTORY  Past Medical History:   Diagnosis Date   • A-fib (CMS/[facility])    • Cancer (CMS/[facility])    • Chronic back pain 10/17/2016    Chronic back and left leg pain, pain management per Dr. Lynch.   • Coronary artery disease 10/17/2016    History of myocardial infarction, . Left heart catheterization, , to unknown vessel. Nuclear stress test, 2010:  Very small sized defect and mild intensity in apical portion of anterior septal region.  EF 68%. Non-ST elevation MI, 2016 with cardiac catheterization by Lobito Steele MD with placement of a drug-eluting stent to the mid-LAD, drug-eluting to the mid-RCA, and a drug-eluting stent to the PDA.  The stent in the LAD, 2.5 x 28 mm Xience drug-eluting stent; mid-right, 3.0 x 33 mm Xience drug-eluting stent.  PDA, 2.5 x 28 mm  Xience drug-eluting stent.  EF 30% with anterolateral apical and inferior hypokinesis.     • Dementia (CMS/HCC)    • Disease of thyroid gland    • Heart attack (CMS/HCC)    • History of UTI    • Hyperlipidemia    • Hypertension    • Lymphoma (CMS/HCC)    • Lymphoma (CMS/HCC)     Non-Hodgkin’s lymphoma   • Stroke (CMS/HCC)    • Syncope 10/17/2016    Presyncope          PAST SURGICAL HISTORY  Past Surgical History:   Procedure Laterality Date   • CARDIAC CATHETERIZATION N/A 10/12/2016    Procedure: Left Heart Cath;  Surgeon: Nikia Chambers MD;  Location: [facility] CATH INVASIVE LOCATION;  Service:    • ESOPHAGEAL DILATATION     • NASAL SEPTAL RECONSTRUCTION     • TONSILLECTOMY           FAMILY HISTORY  Family History   Problem Relation Age of Onset   • Heart disease Father    • Heart attack Father    • Heart disease Maternal Grandmother    • Heart disease Maternal Grandfather    • No Known Problems Mother          SOCIAL HISTORY  Social History     Socioeconomic History   • Marital status:      Spouse name: Not on file   • Number of children: Not on file   • Years of education: Not on file   • Highest education level: Not on file   Tobacco Use   • Smoking status: Never Smoker   • Smokeless tobacco: Never Used   Substance and Sexual Activity   • Alcohol use: Yes     Alcohol/week: 1.0 - 2.0 standard drinks     Types: 1 - 2 Glasses of wine per week     Comment: Pt's daughter will no longer let her have any alcohol   • Drug use: No   • Sexual activity: Never         ALLERGIES  Atorvastatin        REVIEW OF SYSTEMS  Review of Systems       All systems reviewed and negative except for those discussed in HPI.       PHYSICAL EXAM    I have reviewed the triage vital signs and nursing notes.    ED Triage Vitals   Temp Pulse Resp BP SpO2   -- -- -- -- --      Temp src Heart Rate Source Patient Position BP Location FiO2 (%)   -- -- -- -- --       Physical Exam  GENERAL:   Appears disoriented, slightly somnolent.  HENT: rAlene  patent.  Slightly dry mucous membranes  EYES: No scleral icterus.  CV: Regular rhythm, regular rate.  No carotid bruits.  RESPIRATORY: Normal effort.  No audible wheezes, rales or rhonchi.  ABDOMEN: Soft, nontender  MUSCULOSKELETAL: No deformities.   NEURO: Oriented to person but not to place or year/month.  Slow to answer questions and slow to follow commands.  Awake, moves all extremities, follows commands.  No pronator drift.  No leg drift.  SKIN: Warm, dry, no rash visualized.        LAB RESULTS  Recent Results (from the past 24 hour(s))   POC Creatinine    Collection Time: 06/16/21  9:38 PM    Specimen: Blood   Result Value Ref Range    Creatinine 1.10 0.60 - 1.30 mg/dL   POC Surgery Labs    Collection Time: 06/16/21  9:42 PM    Specimen: Blood   Result Value Ref Range    Ionized Calcium 1.27 1.20 - 1.32 mmol/L    POC Potassium 4.0 3.5 - 4.9 mmol/L    Sodium 143 138 - 146 mmol/L    Total CO2 24 24 - 29 mmol/L    Hemoglobin 13.6 12.0 - 17.0 g/dL    Hematocrit 40 38 - 51 %    pCO2, Arterial 37.8 35 - 45 mm Hg    pO2, Arterial 27 (L) 80 - 105 mmHg    Base Excess -2.0000 -5 - 5 mmol/L    O2 Saturation, Arterial 51 (L) 95 - 98 %    pH, Arterial 7.40 7.35 - 7.6 pH units    HCO3, Arterial 23.1 22 - 26 mmol/L    Glucose 132 (H) 70 - 130 mg/dL   POC Protime / INR    Collection Time: 06/16/21  9:46 PM    Specimen: Blood   Result Value Ref Range    Protime 38.2 (H) 12.8 - 15.2 seconds    INR 3.4 (H) 0.8 - 1.2   CBC Auto Differential    Collection Time: 06/16/21  9:57 PM    Specimen: Blood   Result Value Ref Range    WBC 6.39 3.40 - 10.80 10*3/mm3    RBC 3.86 3.77 - 5.28 10*6/mm3    Hemoglobin 12.9 12.0 - 15.9 g/dL    Hematocrit 39.7 34.0 - 46.6 %    .8 (H) 79.0 - 97.0 fL    MCH 33.4 (H) 26.6 - 33.0 pg    MCHC 32.5 31.5 - 35.7 g/dL    RDW 13.2 12.3 - 15.4 %    RDW-SD 49.6 37.0 - 54.0 fl    MPV 10.2 6.0 - 12.0 fL    Platelets 138 (L) 140 - 450 10*3/mm3    Neutrophil % 65.6 42.7 - 76.0 %    Lymphocyte % 21.8 19.6 -  45.3 %    Monocyte % 8.0 5.0 - 12.0 %    Eosinophil % 3.8 0.3 - 6.2 %    Basophil % 0.3 0.0 - 1.5 %    Immature Grans % 0.5 0.0 - 0.5 %    Neutrophils, Absolute 4.20 1.70 - 7.00 10*3/mm3    Lymphocytes, Absolute 1.39 0.70 - 3.10 10*3/mm3    Monocytes, Absolute 0.51 0.10 - 0.90 10*3/mm3    Eosinophils, Absolute 0.24 0.00 - 0.40 10*3/mm3    Basophils, Absolute 0.02 0.00 - 0.20 10*3/mm3    Immature Grans, Absolute 0.03 0.00 - 0.05 10*3/mm3    nRBC 0.0 0.0 - 0.2 /100 WBC   aPTT    Collection Time: 06/16/21  9:58 PM    Specimen: Blood   Result Value Ref Range    PTT 40.5 (H) 22.0 - 39.0 seconds   ECG 12 Lead    Collection Time: 06/16/21 10:07 PM   Result Value Ref Range    QT Interval 374 ms    QTC Interval 445 ms       If labs were ordered, I independently reviewed the results.        RADIOLOGY  CT Angiogram Neck    Result Date: 6/16/2021  CTA of the head and neck with AI analysis for LVO Technique: CT angio head following administration of 100 cc Isovue-370 IV contrast, including coronal and sagittal MIP 3-D reformatted images. Indication: Stroke, follow up Comparison: No pertinent prior study TECHNIQUE: Radiation dose reduction techniques included automated exposure control or exposure modulation based on body size. Radiation audit for number of CT and nuclear cardiology exams performed in the last year: 1.  Evaluation for a significant carotid arterial stenosis is based on the NASCET criteria. Findings:   CT angiography neck: The visualized aortic arch and its major branch vessels demonstrates scattered atherosclerotic calcification and moderate tortuosity. No flow limiting stenosis.. The right carotid bifurcation demonstrates prominent tortuosity. Calcification noted in the proximal right internal carotid artery. There is a stenosis of approximately 40-50% diameter stenosis. Scattered calcifications are demonstrated in the left bifurcation. Marked tortuosity of the left internal carotid artery. There is minimal  calcific plaque in the proximal left internal carotid artery. No significant stenosis demonstrated. Extracranial vertebral arteries are of normal course and caliber. There is a dominant right vertebral artery. Both vertebral arteries contribute to the basilar artery.   CT angiography head: The intracranial portions of the internal carotid arteries demonstrate atherosclerotic calcification. No convincing evidence of a flow-limiting stenosis. MCA and SHREE vessels are patent bilaterally. There is atherosclerotic irregularity in the A1 segment of the right anterior cerebral artery. Also demonstrated is a moderate stenosis of the M1 segment of the right middle cerebral artery. There is a symmetric distal runoff intracranially without evidence of aneurysm or flow-limiting stenosis. Basilar artery is patent. Bilateral posterior cerebral arteries demonstrate symmetric distal runoff without evidence of aneurysm or flow-limiting stenosis. Major dural venous sinuses show no evidence of filling defect.         1.  Prominent calcification in the proximal right internal carotid artery with a stenosis of approximately 40-50% diameter stenosis. 2.  Minimal calcification in the left carotid bifurcation without evidence of a significant stenosis. 3.  Atherosclerotic irregularity of the A1 segment of the right anterior cerebral artery. 4.  Moderate stenosis of the M1 segment of the right middle cerebral artery. Signer Name: Yasir Couch MD  Signed: 6/16/2021 10:39 PM  Workstation Name: [email or web address]  [facility]    XR Chest 1 View    Result Date: 6/16/2021  CR Chest 1 Vw INDICATION: Strokelike symptoms. COMPARISON:  11/9/2020 FINDINGS: Single portable AP view(s) of the chest.  Heart remains enlarged. Left-sided coronary stent is noted. There is atherosclerotic disease in the aorta. There is mild chronic scarring or atelectasis at the left lung base. Lungs otherwise are clear. No pneumothorax is seen. Pulmonary  vascularity is normal.     No acute cardiopulmonary findings. Signer Name: Eduard Fiore MD  Signed: 6/16/2021 10:43 PM  Workstation Name: [facility]  [facility]    CT Head Without Contrast Stroke Protocol    Result Date: 6/16/2021  CT Head WO Code Stroke HISTORY: Aphasia and right-sided weakness TECHNIQUE: Axial unenhanced head CT. Radiation dose reduction techniques included automated exposure control or exposure modulation based on body size. Count of known CT and cardiac nuc med studies performed in previous 12 months: 1. Time of scan: 9:37 PM Report called to [facility] at 10:03 PM. COMPARISON: November 9, 2020 FINDINGS: The ventricles are generous in size. Cortical sulci are correspondingly prominent. No extraaxial fluid collections are seen. Redemonstrated is a densely calcified meningioma in the left frontal area. No parenchymal or subarachnoid hemorrhage is present. Periventricular hypodensities are demonstrated which are nonspecific but likely represent white matter microvascular change in a patient of this age. No CT evidence of mass or acute infarct. The mastoid air cells are clear. The visualized paranasal sinuses are clear. Orbital structures demonstrate no acute findings.     Impression: 1. No clearly acute intracranial pathology demonstrated. 2. Generalized cerebral atrophy and nonspecific periventricular hypodensities which are thought to represent white matter microvascular change. 3. Calcified meningioma in the left frontal area. No significant interval change from study of November 9, 2020 Signer Name: Yasir Couch MD  Signed: 6/16/2021 10:07 PM  Workstation Name: [email or web address]  [facility]    CT Angiogram Head w AI Analysis of LVO    Result Date: 6/16/2021  CTA of the head and neck with AI analysis for LVO Technique: CT angio head following administration of 100 cc Isovue-370 IV contrast, including coronal and sagittal MIP 3-D reformatted  images. Indication: Stroke, follow up Comparison: No pertinent prior study TECHNIQUE: Radiation dose reduction techniques included automated exposure control or exposure modulation based on body size. Radiation audit for number of CT and nuclear cardiology exams performed in the last year: 1.  Evaluation for a significant carotid arterial stenosis is based on the NASCET criteria. Findings:   CT angiography neck: The visualized aortic arch and its major branch vessels demonstrates scattered atherosclerotic calcification and moderate tortuosity. No flow limiting stenosis.. The right carotid bifurcation demonstrates prominent tortuosity. Calcification noted in the proximal right internal carotid artery. There is a stenosis of approximately 40-50% diameter stenosis. Scattered calcifications are demonstrated in the left bifurcation. Marked tortuosity of the left internal carotid artery. There is minimal calcific plaque in the proximal left internal carotid artery. No significant stenosis demonstrated. Extracranial vertebral arteries are of normal course and caliber. There is a dominant right vertebral artery. Both vertebral arteries contribute to the basilar artery.   CT angiography head: The intracranial portions of the internal carotid arteries demonstrate atherosclerotic calcification. No convincing evidence of a flow-limiting stenosis. MCA and SHREE vessels are patent bilaterally. There is atherosclerotic irregularity in the A1 segment of the right anterior cerebral artery. Also demonstrated is a moderate stenosis of the M1 segment of the right middle cerebral artery. There is a symmetric distal runoff intracranially without evidence of aneurysm or flow-limiting stenosis. Basilar artery is patent. Bilateral posterior cerebral arteries demonstrate symmetric distal runoff without evidence of aneurysm or flow-limiting stenosis. Major dural venous sinuses show no evidence of filling defect.         1.  Prominent  calcification in the proximal right internal carotid artery with a stenosis of approximately 40-50% diameter stenosis. 2.  Minimal calcification in the left carotid bifurcation without evidence of a significant stenosis. 3.  Atherosclerotic irregularity of the A1 segment of the right anterior cerebral artery. 4.  Moderate stenosis of the M1 segment of the right middle cerebral artery. Signer Name: Yasir Couch MD  Signed: 6/16/2021 10:39 PM  Workstation Name: [facility]  [facility]    CT CEREBRAL PERFUSION WITH & WITHOUT CONTRAST    Result Date: 6/16/2021  CT CEREBRAL PERFUSION W WO CONTRAST HISTORY: Acute neuro deficit TECHNIQUE: Axial CT images of the brain without and with intravenous contrast using cerebral perfusion protocol. Post-processing parametric maps were created using RAPID software and reviewed. Radiation dose reduction techniques included automated exposure control or exposure modulation based on body size. CT and nuclear cardiology exams in the last 12 months: 1. COMPARISON: No pertinent prior study FINDINGS: Arterial input function is optimal. *  Perfusion parameters: *  Ischemic tissue volume (Tmax > 6 sec.): 0 mL. *  Infarct core volume (CBF < 30%): 0 mL. *  Mismatch (penumbra) volume: 0 mL. *  Mismatch ratio: 0. *  Location/territory: Not applicable. COLLATERAL CIRCULATION PARAMETERS: *  Volume poor collateral perfusion (Tmax > 10 sec.): 0 mL. *  Hypoperfusion index (Tmax >10 sec./Tmax > 6 sec.): 0. *  CBV Index (rCBV in Tmax > 6 sec. region): Not applicable. *  The T max greater than 4 seconds is 22 mL. This finding suggests mild ischemic change in the right occipital lobe and in the left parietal lobe.     No evidence of acute infarct. The T max greater than 4 seconds is 22 mL suggesting mild ischemic change in the right occipital lobe and left parietal lobe. Signer Name: Yasir Couch MD  Signed: 6/16/2021 10:14 PM  Workstation Name: [facility]  [facility] [place]      I ordered and reviewed the above noted radiographic studies.      I viewed images of CT head which showed old meningioma as compared to prior CT scan per my independent interpretation.    See radiologist's dictation for official interpretation.        PROCEDURES    Critical Care  Performed by: Ursula Bedolla MD  Authorized by: Ursula Bedolla MD     Critical care provider statement:     Critical care time (minutes):  35    Critical care time was exclusive of:  Separately billable procedures and treating other patients    Critical care was necessary to treat or prevent imminent or life-threatening deterioration of the following conditions:  CNS failure or compromise    Critical care was time spent personally by me on the following activities:  Ordering and performing treatments and interventions, ordering and review of laboratory studies, ordering and review of radiographic studies, pulse oximetry, re-evaluation of patient's condition, review of old charts, obtaining history from patient or surrogate, examination of patient, evaluation of patient's response to treatment, discussions with consultants and development of treatment plan with patient or surrogate  Comments:      I attended to the patient under code stroke protocol.  I considered the use of IV TPA however risk is greater than benefit and patient symptoms are significantly improving.        ECG 12 Lead   Final Result   Test Reason : Acute Stroke Protocol (onset < 12 hrs)   Blood Pressure :   */*   mmHG   Vent. Rate :  85 BPM     Atrial Rate :  86 BPM      P-R Int :   * ms          QRS Dur :  86 ms       QT Int : 374 ms       P-R-T Axes :   *  19 -31 degrees      QTc Int : 445 ms      Atrial fibrillation   Possible Anterior infarct (cited on or before 09-NOV-2020)   Abnormal ECG   When compared with ECG of 09-NOV-2020 19:32,   No significant change was found   Confirmed by URSULA BEDOLLA MD ([id number]) on 6/16/2021 10:21:37 PM       Referred By: EDMD           Confirmed By: URSULA BEDOLLA MD          MEDICATIONS GIVEN IN ER    Medications   sodium chloride 0.9 % flush 10 mL (has no administration in time range)   iopamidol (ISOVUE-370) 76 % injection 150 mL (115 mL Intravenous Given 6/16/21 2155)         PROGRESS, DATA ANALYSIS, CONSULTS, AND MEDICAL DECISION MAKING    All labs have been independently reviewed by me.  All radiology studies have been reviewed by me and the radiologist dictating the report.   EKG's have been independently viewed and interpreted by me.      Differential diagnoses: Consider TIA, CVA, hypotension, occult infection, etc.      ED Course as of Jun 16 2249   Wed Jun 16, 2021 2218 I have contacted Dr. Rasmussen, hospitalist for admission.    [MS]   2218 I have reviewed multiple records on this patient.  I see no prior MRI of the brain.  She has suffered from hypotension and altered mental status but I see no evidence of unilateral symptoms such as she suffered from this evening.    [MS]   2247 Spoke with Dr. Rasmussen who will admit.    [MS]      ED Course User Index  [MS] Ursula Bedolla MD             AS OF 22:49 EDT VITALS:    BP - 170/94  HR - 95  TEMP - 98.7 °F (37.1 °C) (Oral)  O2 SATS - 96%        DIAGNOSIS  Final diagnoses:   Altered mental status, unspecified altered mental status type   Right arm weakness         DISPOSITION  Admission           Ursula Bedolla MD  06/16/21 [id number]

## 2021-06-17 NOTE — PLAN OF CARE
Goal Outcome Evaluation:  Plan of Care Reviewed With: patient        Progress: no change (eval)   SLP evaluation completed. Will address cognitive communication in therapy. Please see note for further details and recommendations.

## 2021-06-17 NOTE — PLAN OF CARE
Goal Outcome Evaluation:  Plan of Care Reviewed With: patient        Progress: no change (PT eval)  Outcome Summary: PT eval complete. Pt presents with declined functional mobility warranting IPPT to promote return to PLOF. Pt ambulated 200 feet with RW and min A requiring assist/cues as she veers toward her left frequently. Performed bed mobility with supervision. Recommend d/c home with 24/7 assist as long as she has help from family.

## 2021-06-17 NOTE — CONSULTS
Stroke Consult Note    Patient Name: Sarah Kennedy   MRN: [id number]  Age: 89 y.o.  Sex: female  : 1929    Primary Care Physician: Martha Montague PA  Referring Physician:  Dr Laird    TIME STROKE TEAM CALLED:  EST     TIME PATIENT SEEN:  EST    Handedness: Right    Race: white    Chief Complaint/Reason for Consultation: AMS    HPI: Mrs Kennedy is a 89 year old white, right handed female with known diagnosis of Afib (on Xarelto, INR 3.4), HTN, Alzheimers, HLD, CAD with stents, hypothyroidism that presents with altered mental status and possible right upper extremity drift.  Son reports she had dinner and was her normal self. He went to get her dessert and when he returned she seemed confused and would not answer questions appropriately.  He attempted to stand her but noted generalized weakness. He contacted his sister who is the primary caregiver who recommended he call 911. EMS noted slight right upper extremity drift on their exam. EMS also reports she has these episodes frequently per son.    She is on anticoagulant. Initial /100.    Last Known Normal Date/Time:  EST     Review of Systems   Unable to perform ROS: Dementia        Temp:  [98.7 °F (37.1 °C)] 98.7 °F (37.1 °C)  Heart Rate:  [83-95] 92  Resp:  [16] 16  BP: (170-171)/(94-99) 171/99    Neurological Exam  Mental Status  Awake and alert. Oriented only to person. Speech is normal. Language is fluent with no aphasia.    Cranial Nerves  CN II: Visual fields full to confrontation.  CN III, IV, VI: Extraocular movements intact bilaterally.  CN V: Facial sensation is normal.  CN VII: Full and symmetric facial movement.  CN IX, X: Palate elevates symmetrically  CN XI: Shoulder shrug strength is normal.  CN XII: Tongue midline without atrophy or fasciculations.    Motor  Normal muscle bulk throughout. No fasciculations present. Normal muscle tone. Strength is 5/5 throughout all four extremities.    Sensory  Light touch is normal  in upper and lower extremities.     Coordination  Right: Finger-to-nose normal. Heel-to-shin normal.  Left: Finger-to-nose normal.    Gait  Not observed.      Physical Exam  Vitals reviewed.   Constitutional:       General: She is awake.      Appearance: Normal appearance.   HENT:      Head: Normocephalic and atraumatic.      Mouth/Throat:      Mouth: Mucous membranes are moist.   Eyes:      Extraocular Movements: Extraocular movements intact.   Cardiovascular:      Rate and Rhythm: Normal rate.   Pulmonary:      Effort: Pulmonary effort is normal.   Skin:     General: Skin is warm and dry.   Neurological:      Mental Status: She is alert. She is disoriented.      Deep Tendon Reflexes: Strength normal.   Psychiatric:         Speech: Speech normal.      Comments: Confused but cooperative         Acute Stroke Data    Alteplase (tPA) Inclusion / Exclusion Criteria    Time: 2121  Person Administering Scale: LESLY Newell    Inclusion Criteria  [x]   18 years of age or greater   []   Onset of symptoms < 4.5 hours before beginning treatment (stroke onset = time patient was last seen well or without symptoms).   []   Diagnosis of acute ischemic stroke causing measurable disabling deficit (Complete Hemianopia, Any Aphasia, Visual or Sensory Extinction, Any weakness limiting sustained effort against gravity)   []   Any remaining deficit considered potentially disabling in view of patient and practitioner   Exclusion criteria (Do not proceed with Alteplase if any are checked under exclusion criteria)  []   Onset unknown or GREATER than 4.5 hours   []   ICH on CT/MRI   []   CT demonstrates hypodensity representing acute or subacute infarct   []   Significant head trauma or prior stroke in the previous 3 months   []   Symptoms suggestive of subarachnoid hemorrhage   []   History of un-ruptured intracranial aneurysm GREATER than 10 mm   []   Recent intracranial or intraspinal surgery within the last 3 months   []    Arterial puncture at a non-compressible site in the previous 7 days   []   Active internal bleeding   []   Acute bleeding tendency   []   Platelet count LESS than 100,000 for known hematological diseases such as leukemia, thrombocytopenia or chronic cirrhosis   [x]   Current use of anticoagulant with INR GREATER than 1.7 or PT GREATER than 15 seconds, aPTT GREATER than 40 seconds   []   Heparin received within 48 hours, resulting in abnormally elevated aPTT GREATER than upper limit of normal   []   Current use of direct thrombin inhibitors or direct factor Xa inhibitors in the past 48 hours   []   Elevated blood pressure refractory to treatment (systolic GREATER than 185 mm/Hg or diastolic  GREATER than 110 mm/Hg   []   Suspected infective endocarditis and aortic arch dissection   []   Current use of therapeutic treatment dose of low-molecular-weight heparin (LMWH) within the previous 24 hours   []   Structural GI malignancy or bleed   Relative exclusion for all patients  [x]   Only minor non-disabling symptoms   []   Pregnancy   []   Seizure at onset with postictal residual neurological impairments   []   Major surgery or previous trauma within past 14 days   []   History of previous spontaneous ICH, intracranial neoplasm, or AV malformation   []   Postpartum (within previous 14 days)   []   Recent GI or urinary tract hemorrhage (within previous 21 days)   []   Recent acute MI (within previous 3 months)   []   History of un-ruptured intracranial aneurysm LESS than 10 mm   []   History of ruptured intracranial aneurysm   []   Blood glucose LESS than 50 mg/dL (2.7 mmol/L)   []   Dural puncture within the last 7 days   []   Known GREATER than 10 cerebral microbleeds   Additional exclusions for patients with symptoms onset between 3 and 4.5 hours.  []   Age > 80.   []   On any anticoagulants regardless of INR  >>> Warfarin (Coumadin), Heparin, Enoxaparin (Lovenox), fondaparinux (Arixtra), bivalirudin (Angiomax),  Argatroban, dabigatran (Pradaxa), rivaroxaban (Xarelto), or apixaban (Eliquis)   []   Severe stroke (NIHSS > 25).   []   History of BOTH diabetes and previous ischemic stroke.   []   The risks and benefits have been discussed with the patient or family related to the administration of IV Alteplase for stroke symptoms.   []   I have discussed and reviewed the patient's case and imaging with the attending prior to IV Alteplase.   Not administered Time Alteplase administered       Past Medical History:   Diagnosis Date   • A-fib (CMS/HCC)    • Cancer (CMS/HCC)    • Chronic back pain 10/17/2016    Chronic back and left leg pain, pain management per Dr. Lynch.   • Coronary artery disease 10/17/2016    History of myocardial infarction, 1984. Left heart catheterization, 2007, to unknown vessel. Nuclear stress test, 03/22/2010:  Very small sized defect and mild intensity in apical portion of anterior septal region.  EF 68%. Non-ST elevation MI, 02/22/2016 with cardiac catheterization by Lobito Steele MD with placement of a drug-eluting stent to the mid-LAD, drug-eluting to the mid-RCA, and a drug-eluting stent to the PDA.  The stent in the LAD, 2.5 x 28 mm Xience drug-eluting stent; mid-right, 3.0 x 33 mm Xience drug-eluting stent.  PDA, 2.5 x 28 mm Xience drug-eluting stent.  EF 30% with anterolateral apical and inferior hypokinesis.     • Dementia (CMS/HCC)    • Disease of thyroid gland    • Heart attack (CMS/HCC)    • History of UTI    • Hyperlipidemia    • Hypertension    • Lymphoma (CMS/HCC)    • Lymphoma (CMS/HCC)     Non-Hodgkin’s lymphoma   • Stroke (CMS/HCC)    • Syncope 10/17/2016    Presyncope      Past Surgical History:   Procedure Laterality Date   • CARDIAC CATHETERIZATION N/A 10/12/2016    Procedure: Left Heart Cath;  Surgeon: Nikia Chambers MD;  Location: [facility] INVASIVE LOCATION;  Service:    • ESOPHAGEAL DILATATION     • NASAL SEPTAL RECONSTRUCTION     • TONSILLECTOMY       Family History   Problem  Relation Age of Onset   • Heart disease Father    • Heart attack Father    • Heart disease Maternal Grandmother    • Heart disease Maternal Grandfather    • No Known Problems Mother      Social History     Socioeconomic History   • Marital status:      Spouse name: Not on file   • Number of children: Not on file   • Years of education: Not on file   • Highest education level: Not on file   Tobacco Use   • Smoking status: Never Smoker   • Smokeless tobacco: Never Used   Substance and Sexual Activity   • Alcohol use: Yes     Alcohol/week: 1.0 - 2.0 standard drinks     Types: 1 - 2 Glasses of wine per week     Comment: Pt's daughter will no longer let her have any alcohol   • Drug use: No   • Sexual activity: Never     Allergies   Allergen Reactions   • Atorvastatin Myalgia     Prior to Admission medications    Medication Sig Start Date End Date Taking? Authorizing Provider   carvedilol (COREG) 12.5 MG tablet Take 12.5 mg by mouth 2 (Two) Times a Day With Meals.    Philip Murphy MD   Docusate Sodium (ERNIE-100 PO) Take 100 mg by mouth Daily.    Philip Murphy MD   ferrous sulfate 325 (65 FE) MG tablet Take 325 mg by mouth Daily With Breakfast.    Philip Murphy MD   folic acid (FOLVITE) 800 MCG tablet Take 800 mcg by mouth Daily.    Philip Murphy MD   levothyroxine (SYNTHROID, LEVOTHROID) 50 MCG tablet Take 50 mcg by mouth Daily.    Philip Murphy MD   mirtazapine (REMERON) 30 MG tablet Take 15 mg by mouth Every Night. 1/2 TABLET     Philip Murphy MD   multivitamin with minerals (MULTIVITAMIN ADULT PO) Take 1 tablet by mouth Daily.    Philip Murphy MD   rosuvastatin (CRESTOR) 20 MG tablet TAKE 1 TABLET BY MOUTH EVERY NIGHT.  Patient taking differently: Take 10 mg by mouth Every Night. 1/25/21   Jennifer Morgan MD   thiamine (VITAMINE B-1) 100 MG tablet Take 100 mg by mouth daily.    Philip Murphy MD   Xarelto 15 MG tablet TAKE 1 TABLET BY MOUTH  DAILY WITH DINNER. PLEASE CALL TO SCHEDULE AN APPT OR FURTHER REFILLS FROM PCP 21   [facility]:  Scheduled-    Infusions-     PRNs- •  sodium chloride    Functional Status Prior to Current Stroke/Adriana Score: 4    NIH Stroke Scale  Time:   Person Administering Scale: LESLY Newell    1a  Level of consciousness: 0=alert; keenly responsive   1b. LOC questions:  2=Performs neither task correctly   1c. LOC commands: 0=Performs both tasks correctly   2.  Best Gaze: 0=normal   3.  Visual: 0=No visual loss   4. Facial Palsy: 0=Normal symmetric movement   5a.  Motor left arm: 0=No drift, limb holds 90 (or 45) degrees for full 10 seconds   5b.  Motor right arm: 0=No drift, limb holds 90 (or 45) degrees for full 10 seconds   6a. motor left le=No drift, limb holds 90 (or 45) degrees for full 10 seconds   6b  Motor right le=No drift, limb holds 90 (or 45) degrees for full 10 seconds   7. Limb Ataxia: 0=Absent   8.  Sensory: 0=Normal; no sensory loss   9. Best Language:  0=No aphasia, normal   10. Dysarthria: 0=Normal   11. Extinction and Inattention: 0=No abnormality    Total:   2       Results Reviewed:  I have personally reviewed current lab, radiology, and data and agree with results.  Lab Results (last 24 hours)     Procedure Component Value Units Date/Time    Dante Draw [[id number]] Collected: 21    Specimen: Blood Updated: 21    Narrative:      The following orders were created for panel order Dante Draw.  Procedure                               Abnormality         Status                     ---------                               -----------         ------                     Green Top (Gel)[[id number]]                                                             Lavender Top[[id number]]                                     In process                 Gold Top - SST[[id number]]                                   In process                    Montague Top[[id number]]                                         In process                   Please view results for these tests on the individual orders.    Troponin [[id number]] Collected: 06/16/21 2242    Specimen: Blood Updated: 06/16/21 2243    AST [[id number]] Collected: 06/16/21 2242    Specimen: Blood Updated: 06/16/21 2243    ALT [[id number]] Collected: 06/16/21 2242    Specimen: Blood Updated: 06/16/21 2243    Green Top (Gel) [[id number]] Collected: 06/16/21 2242    Specimen: Blood Updated: 06/16/21 2243    aPTT [[id number]]  (Abnormal) Collected: 06/16/21 2158    Specimen: Blood Updated: 06/16/21 2229     PTT 40.5 seconds     Narrative:      PTT = The equivalent PTT values for the therapeutic range of heparin levels at 0.3 to 0.5 U/ml are 55 to 70 seconds.    CBC & Differential [[id number]]  (Abnormal) Collected: 06/16/21 2157    Specimen: Blood Updated: 06/16/21 2210    Narrative:      The following orders were created for panel order CBC & Differential.  Procedure                               Abnormality         Status                     ---------                               -----------         ------                     CBC Auto Differential[[id number]]        Abnormal            Final result                 Please view results for these tests on the individual orders.    CBC Auto Differential [[id number]]  (Abnormal) Collected: 06/16/21 2157    Specimen: Blood Updated: 06/16/21 2210     WBC 6.39 10*3/mm3      RBC 3.86 10*6/mm3      Hemoglobin 12.9 g/dL      Hematocrit 39.7 %      .8 fL      MCH 33.4 pg      MCHC 32.5 g/dL      RDW 13.2 %      RDW-SD 49.6 fl      MPV 10.2 fL      Platelets 138 10*3/mm3      Neutrophil % 65.6 %      Lymphocyte % 21.8 %      Monocyte % 8.0 %      Eosinophil % 3.8 %      Basophil % 0.3 %      Immature Grans % 0.5 %      Neutrophils, Absolute 4.20 10*3/mm3      Lymphocytes, Absolute 1.39 10*3/mm3      Monocytes, Absolute 0.51 10*3/mm3      Eosinophils, Absolute 0.24  10*3/mm3      Basophils, Absolute 0.02 10*3/mm3      Immature Grans, Absolute 0.03 10*3/mm3      nRBC 0.0 /100 WBC     Montague Top [[id number]] Collected: 06/16/21 2158    Specimen: Blood Updated: 06/16/21 2206    Gold Top - SST [[id number]] Collected: 06/16/21 2156    Specimen: Blood Updated: 06/16/21 2206    Lavender Top [[id number]] Collected: 06/16/21 2157    Specimen: Blood Updated: 06/16/21 2206    POC Creatinine [[id number]]  (Normal) Collected: 06/16/21 2138    Specimen: Blood Updated: 06/16/21 2206     Creatinine 1.10 mg/dL      Comment: Serial Number: [id number]:  [id number]       POC Protime / INR [[id number]]  (Abnormal) Collected: 06/16/21 2146    Specimen: Blood Updated: 06/16/21 2151     Protime 38.2 seconds      INR 3.4     Comment: Serial Number: [id number]:  [id number]       POC Surgery Labs [[id number]]  (Abnormal) Collected: 06/16/21 2142    Specimen: Blood Updated: 06/16/21 2151     Ionized Calcium 1.27 mmol/L      POC Potassium 4.0 mmol/L      Sodium 143 mmol/L      Total CO2 24 mmol/L      Hemoglobin 13.6 g/dL      Hematocrit 40 %      pCO2, Arterial 37.8 mm Hg      pO2, Arterial 27 mmHg      Comment: Serial Number: [id number]:  [id number]        Base Excess -2.0000 mmol/L      O2 Saturation, Arterial 51 %      pH, Arterial 7.40 pH units      HCO3, Arterial 23.1 mmol/L      Glucose 132 mg/dL         Imaging Results (Last 24 Hours)     Procedure Component Value Units Date/Time    XR Chest 1 View [[id number]] Collected: 06/16/21 2243     Updated: 06/16/21 2245    Narrative:      CR Chest 1 Vw    INDICATION:   Strokelike symptoms.     COMPARISON:    11/9/2020    FINDINGS:  Single portable AP view(s) of the chest.  Heart remains enlarged. Left-sided coronary stent is noted. There is atherosclerotic disease in the aorta. There is mild chronic scarring or atelectasis at the left lung base. Lungs otherwise are clear. No  pneumothorax is seen. Pulmonary vascularity is normal.      Impression:      No acute  cardiopulmonary findings.    Signer Name: Eduard Fiore MD   Signed: 6/16/2021 10:43 PM   Workstation Name: [email or web address]    [facility]    CT Angiogram Neck [[id number]] Collected: 06/16/21 2239     Updated: 06/16/21 2241    Narrative:        CTA of the head and neck with AI analysis for LVO    Technique: CT angio head following administration of 100 cc Isovue-370 IV contrast, including coronal and sagittal MIP 3-D reformatted images.     Indication: Stroke, follow up    Comparison: No pertinent prior study    TECHNIQUE:   Radiation dose reduction techniques included automated exposure control or exposure modulation based on body size. Radiation audit for number of CT and nuclear cardiology exams performed in the last year: 1.     Evaluation for a significant carotid arterial stenosis is based on the NASCET criteria.    Findings:      CT angiography neck:     The visualized aortic arch and its major branch vessels demonstrates scattered atherosclerotic calcification and moderate tortuosity. No flow limiting stenosis..    The right carotid bifurcation demonstrates prominent tortuosity. Calcification noted in the proximal right internal carotid artery. There is a stenosis of approximately 40-50% diameter stenosis.    Scattered calcifications are demonstrated in the left bifurcation. Marked tortuosity of the left internal carotid artery. There is minimal calcific plaque in the proximal left internal carotid artery. No significant stenosis demonstrated.    Extracranial vertebral arteries are of normal course and caliber. There is a dominant right vertebral artery. Both vertebral arteries contribute to the basilar artery.      CT angiography head:     The intracranial portions of the internal carotid arteries demonstrate atherosclerotic calcification. No convincing evidence of a flow-limiting stenosis.    MCA and SHREE vessels are patent bilaterally. There is atherosclerotic irregularity in the A1  segment of the right anterior cerebral artery. Also demonstrated is a moderate stenosis of the M1 segment of the right middle cerebral artery.    There is a symmetric distal runoff intracranially without evidence of aneurysm or flow-limiting stenosis.    Basilar artery is patent. Bilateral posterior cerebral arteries demonstrate symmetric distal runoff without evidence of aneurysm or flow-limiting stenosis.     Major dural venous sinuses show no evidence of filling defect.         Impression:          1.  Prominent calcification in the proximal right internal carotid artery with a stenosis of approximately 40-50% diameter stenosis.    2.  Minimal calcification in the left carotid bifurcation without evidence of a significant stenosis.    3.  Atherosclerotic irregularity of the A1 segment of the right anterior cerebral artery.    4.  Moderate stenosis of the M1 segment of the right middle cerebral artery.    Signer Name: Yasir Couch MD   Signed: 6/16/2021 10:39 PM   Workstation Name: [email or web address]    [facility]    CT Angiogram Head w AI Analysis of LVO [[id number]] Collected: 06/16/21 2239     Updated: 06/16/21 2241    Narrative:        CTA of the head and neck with AI analysis for LVO    Technique: CT angio head following administration of 100 cc Isovue-370 IV contrast, including coronal and sagittal MIP 3-D reformatted images.     Indication: Stroke, follow up    Comparison: No pertinent prior study    TECHNIQUE:   Radiation dose reduction techniques included automated exposure control or exposure modulation based on body size. Radiation audit for number of CT and nuclear cardiology exams performed in the last year: 1.     Evaluation for a significant carotid arterial stenosis is based on the NASCET criteria.    Findings:      CT angiography neck:     The visualized aortic arch and its major branch vessels demonstrates scattered atherosclerotic calcification and moderate tortuosity. No flow  limiting stenosis..    The right carotid bifurcation demonstrates prominent tortuosity. Calcification noted in the proximal right internal carotid artery. There is a stenosis of approximately 40-50% diameter stenosis.    Scattered calcifications are demonstrated in the left bifurcation. Marked tortuosity of the left internal carotid artery. There is minimal calcific plaque in the proximal left internal carotid artery. No significant stenosis demonstrated.    Extracranial vertebral arteries are of normal course and caliber. There is a dominant right vertebral artery. Both vertebral arteries contribute to the basilar artery.      CT angiography head:     The intracranial portions of the internal carotid arteries demonstrate atherosclerotic calcification. No convincing evidence of a flow-limiting stenosis.    MCA and SHREE vessels are patent bilaterally. There is atherosclerotic irregularity in the A1 segment of the right anterior cerebral artery. Also demonstrated is a moderate stenosis of the M1 segment of the right middle cerebral artery.    There is a symmetric distal runoff intracranially without evidence of aneurysm or flow-limiting stenosis.    Basilar artery is patent. Bilateral posterior cerebral arteries demonstrate symmetric distal runoff without evidence of aneurysm or flow-limiting stenosis.     Major dural venous sinuses show no evidence of filling defect.         Impression:          1.  Prominent calcification in the proximal right internal carotid artery with a stenosis of approximately 40-50% diameter stenosis.    2.  Minimal calcification in the left carotid bifurcation without evidence of a significant stenosis.    3.  Atherosclerotic irregularity of the A1 segment of the right anterior cerebral artery.    4.  Moderate stenosis of the M1 segment of the right middle cerebral artery.    Signer Name: Yasir Couch MD   Signed: 6/16/2021 10:39 PM   Workstation Name: [email or web address]    [facility]    CT CEREBRAL PERFUSION WITH & WITHOUT CONTRAST [[id number]] Collected: 06/16/21 2214     Updated: 06/16/21 2217    Narrative:      CT CEREBRAL PERFUSION W WO CONTRAST    HISTORY:   Acute neuro deficit    TECHNIQUE:   Axial CT images of the brain without and with intravenous contrast using cerebral perfusion protocol. Post-processing parametric maps were created using RAPID software and reviewed. Radiation dose reduction techniques included automated exposure control  or exposure modulation based on body size. CT and nuclear cardiology exams in the last 12 months: 1.    COMPARISON:   No pertinent prior study    FINDINGS:   Arterial input function is optimal.     *  Perfusion parameters:    *  Ischemic tissue volume (Tmax > 6 sec.): 0 mL.  *  Infarct core volume (CBF < 30%): 0 mL.  *  Mismatch (penumbra) volume: 0 mL.  *  Mismatch ratio: 0.  *  Location/territory: Not applicable.    COLLATERAL CIRCULATION PARAMETERS:    *  Volume poor collateral perfusion (Tmax > 10 sec.): 0 mL.  *  Hypoperfusion index (Tmax >10 sec./Tmax > 6 sec.): 0.  *  CBV Index (rCBV in Tmax > 6 sec. region): Not applicable.    *  The T max greater than 4 seconds is 22 mL. This finding suggests mild ischemic change in the right occipital lobe and in the left parietal lobe.        Impression:      No evidence of acute infarct.    The T max greater than 4 seconds is 22 mL suggesting mild ischemic change in the right occipital lobe and left parietal lobe.          Signer Name: Yasir Couch MD   Signed: 6/16/2021 10:14 PM   Workstation Name: [email or web address]    [facility]    CT Head Without Contrast Stroke Protocol [[id number]] Collected: 06/16/21 2207     Updated: 06/16/21 2209    Narrative:      CT Head WO Code Stroke    HISTORY:   Aphasia and right-sided weakness    TECHNIQUE:   Axial unenhanced head CT. Radiation dose reduction techniques included automated exposure control or exposure modulation based on body  size. Count of known CT and cardiac nuc med studies performed in previous 12 months: 1.     Time of scan: 9:37 PM    Report called to [place] CT at 10:03 PM.    COMPARISON:   November 9, 2020    FINDINGS:   The ventricles are generous in size. Cortical sulci are correspondingly prominent. No extraaxial fluid collections are seen.    Redemonstrated is a densely calcified meningioma in the left frontal area.    No parenchymal or subarachnoid hemorrhage is present. Periventricular hypodensities are demonstrated which are nonspecific but likely represent white matter microvascular change in a patient of this age. No CT evidence of mass or acute infarct.    The mastoid air cells are clear. The visualized paranasal sinuses are clear.  Orbital structures demonstrate no acute findings.      Impression:      Impression:  1. No clearly acute intracranial pathology demonstrated.  2. Generalized cerebral atrophy and nonspecific periventricular hypodensities which are thought to represent white matter microvascular change.  3. Calcified meningioma in the left frontal area. No significant interval change from study of November 9, 2020    Signer Name: Yasir Couch MD   Signed: 6/16/2021 10:07 PM   Workstation Name: [id number]    [facility]        Results for orders placed during the hospital encounter of 02/14/19    Adult Transthoracic Echo Complete W/ Cont if Necessary Per Protocol    Interpretation Summary  · Estimated EF = 55%.  · Left ventricular systolic function is normal.  · Mild-to-moderate mitral valve regurgitation is present  · Moderate tricuspid valve regurgitation is present.  · Calculated right ventricular systolic pressure from tricuspid regurgitation is 55 mmHg.      Assessment/Plan:  Mrs Kennedy is a 89 year old white, right handed female with known diagnosis of Afib (on Xarelto, INR 3.4), HTN, Alzheimers, HLD, CAD with stents, hypothyroidism that presents with altered mental status  and possible right upper extremity drift. She was not a candidate for TPA or thrombectomy.    Imaging/Labs reviewed  - CT head no acute change  - CTA head/neck Prominent calcification in the proximal right internal carotid artery with a stenosis of approximately 40-50% diameter stenosis. Atherosclerotic irregularity of the A1 segment of the right anterior cerebral artery. Moderate stenosis of the M1 segment of the right middle cerebral artery.  - CTP No evidence of acute infarct. The T max greater than 4 seconds is 22 mL suggesting mild ischemic change in the right occipital lobe and left parietal lobe.         1. TIA, moderate ICAD  - initiate TIA/AIS order set  - MRI brain without contrast  - hold Xarelto until MRI reviewed  - ASA 81mg daily  - high intensity statin  - echo  - lipid panel and a1c in am  - permissive HTN    Thank you for the consult. Case discussed with Dr Laird, patient, son Jemal. Attempted to call daughter, no answer.  Stroke Neurology will continue to follow.     Zina Hinton, APRN  June 16, 2021  22:55 EDT

## 2021-06-17 NOTE — PLAN OF CARE
Goal Outcome Evaluation:  Plan of Care Reviewed With: patient        Progress: improving   Pt is alert and oriented x3, disoriented to time. VSS on room air. Afib on tele. Pt has slept the majority of the shift. No c/o of pain. Pt is [place]. NIH score is 0. Dysphagia screen passed. MRI completed this shift. Up to bedside commode and voiding spontaneously. Will continue to monitor.

## 2021-06-18 NOTE — DISCHARGE SUMMARY
[facility] Medicine Services  DISCHARGE SUMMARY    Patient Name: Sarah Kennedy  : 1929  MRN: [id number]    Date of Admission: 2021  9:21 PM  Date of Discharge:  2021  Primary Care Physician: Martha Montague PA    Consults     Date and Time Order Name Status Description    2021 12:35 AM Inpatient Neurology Consult Stroke Completed     2021  9:24 PM Inpatient Neurology Consult Stroke Completed           Hospital Course     Presenting Problem:   Altered mental status, unspecified altered mental status type [R41.82]    Active Hospital Problems    Diagnosis  POA   • **Suspected cerebrovascular accident (CVA) [R09.89]  Yes   • Altered mental status [R41.82]  Yes   • Systolic dysfunction [I51.9]  Yes   • Coronary artery disease [I25.10]  Yes   • Hypothyroidism [E03.9]  Yes   • Hyperlipidemia [E78.5]  Yes   • Hx-TIA (transient ischemic attack) [Z86.73]  Not Applicable   • HTN (hypertension) [I10]  Yes   • GERD (gastroesophageal reflux disease) [K21.9]  Yes   • Permanent atrial fibrillation (CMS/HCC) [I48.21]  Yes      Resolved Hospital Problems   No resolved problems to display.          Hospital Course:  Sarah Kennedy is a 89 y.o. female w/ a hx of Atrial Fibrillation, CHF, CAD, HTN, HLD, hypothyroidism, GERD, remote TIA, dementia who presented to the ED w/ c/o altered mental status and right sided weakness.      Suspected CVA   --sudden onset confusion, aphasia and RUE weakness  -- CT Head, CTA Head/neck and CT cerebral perfusion unremarkable  -- MRI Brain unremarkable  -- ECHO with no evidence of PFO  -- Stroke Navigator consulted on admission  -- Neurology consulted  -- pt,ot, speech and case mgt consulted. Home with assist recommended   -- d/c ASA and started on Plavix per Neuro, resume home dose statin upon d/c  -- LDL 84, tsh wnl  -- EKG c/w Afib; continue Xarelto         Pyuria, suspected UTI (POA)  -- Daughter reports history of similar presentation  with previously being diagnosed with UTI  -- Empirically treated with ceftriaxone for three total doses.  Defer further abx at time of d/c as she completed a full course for uncomplicated UTI  -- Urine culture with only 100K normal jojo        Atrial fibrillation  CAD s/p stents in remote past (2016)  HTN  HLD  Hx of TIA  -- EKG c/w Afib   -- routine Coreg, resume home dose statin upon d/c  -- routine Xarelto continued      H/o systolic CHF  --EF of 25% on LHC in October 2016  -- ECHO 2/2019; EF 55% w/ normal LVSF, RVSP 55mmg, mild-moderate mitral valve regurg and moderate tricuspid regurg  -- repeat ECHO with normal EF, no PFO.      Hypothyroidism   -tsh wnl  -continue synthroid      Alcohol use  -pt reports 1-2 drinks daily  -per hx- daughter monitors and doesn't let her drink anymore  -ethanol level mildly elevated, so likely did have a drink on night of admission  -CIWA protocol started on admission                    Discharge Follow Up Recommendations for outpatient labs/diagnostics:   1. Follow up with PCP within one week         Day of Discharge     HPI:   Doing well this am, wants to go home. Son at bedside.     Review of Systems  Gen- No fevers, chills  CV- No chest pain, palpitations  Resp- No cough, dyspnea  GI- No N/V/D, abd pain        Vital Signs:   Temp:  [97.5 °F (36.4 °C)-97.9 °F (36.6 °C)] 97.6 °F (36.4 °C)  Heart Rate:  [71-88] 81  Resp:  [16-17] 17  BP: (138-185)/() 138/92     Physical Exam:  Constitutional: No acute distress, awake, alert  HENT: NCAT, mucous membranes moist  Respiratory: Clear to auscultation bilaterally, respiratory effort normal   Cardiovascular: RRR, no murmurs, rubs, or gallops  Gastrointestinal: Positive bowel sounds, soft, nontender, nondistended  Musculoskeletal: No bilateral ankle edema  Psychiatric: Appropriate affect, cooperative  Neurologic: Oriented x 3, strength symmetric in all extremities, Cranial Nerves grossly intact to confrontation, speech clear  Skin:  No rashes       Pertinent  and/or Most Recent Results     LAB RESULTS:      Lab 06/19/21  0842 06/18/21  0734 06/17/21  0855 06/16/21 2158 06/16/21 2157 06/16/21 2146 06/16/21 2142   WBC 5.61 4.61 5.60  --  6.39  --   --    HEMOGLOBIN 12.6 11.9* 12.3  --  12.9  --   --    HEMOGLOBIN, POC  --   --   --   --   --   --  13.6   HEMATOCRIT 41.9 36.6 40.0  --  39.7  --   --    HEMATOCRIT POC  --   --   --   --   --   --  40   PLATELETS 118* 118* 112*  --  138*  --   --    NEUTROS ABS  --   --  3.05  --  4.20  --   --    IMMATURE GRANS (ABS)  --   --  0.02  --  0.03  --   --    LYMPHS ABS  --   --  1.75  --  1.39  --   --    MONOS ABS  --   --  0.53  --  0.51  --   --    EOS ABS  --   --  0.22  --  0.24  --   --    .0* 104.9* 108.4*  --  102.8*  --   --    PROTIME  --   --   --   --   --  38.2*  --    APTT  --   --   --  40.5*  --   --   --          Lab 06/19/21  0842 06/18/21  0734 06/17/21  0857 06/16/21 2242 06/16/21 2138   SODIUM 140 137 135* 139  --    POTASSIUM 3.8 3.7 4.3 3.7  --    CHLORIDE 107 107 107 104  --    CO2 21.0* 23.0 16.0* 22.0  --    ANION GAP 12.0 7.0 12.0 13.0  --    BUN 12 13 12 13  --    CREATININE 0.81 0.75 0.81 0.91 1.10   GLUCOSE 113* 97 97 112*  --    CALCIUM 9.1 8.5 9.3 9.1  --    MAGNESIUM  --   --  2.0  --   --    TSH  --   --  1.540  --   --          Lab 06/17/21  0857 06/16/21 2242   TOTAL PROTEIN 5.6*  --    ALBUMIN 3.30*  --    GLOBULIN 2.3  --    ALT (SGPT) 7 6   AST (SGOT) 21 19   BILIRUBIN 0.4  --    ALK PHOS 96  --          Lab 06/16/21 2242 06/16/21 2146   TROPONIN T <0.010  --    PROTIME  --  38.2*   INR  --  3.4*         Lab 06/17/21  0857   CHOLESTEROL 145   LDL CHOL 84   HDL CHOL 39*   TRIGLYCERIDES 121             Lab 06/16/21 2142   PH, ARTERIAL 7.40     Brief Urine Lab Results  (Last result in the past 365 days)      Color   Clarity   Blood   Leuk Est   Nitrite   Protein   CREAT   Urine HCG        06/16/21 2246 Yellow Clear Negative Large (3+) Negative  Negative             Microbiology Results (last 10 days)     Procedure Component Value - Date/Time    COVID PRE-OP / PRE-PROCEDURE SCREENING ORDER (NO ISOLATION) - Swab, Nasopharynx [[id number]]  (Normal) Collected: 06/16/21 2243    Lab Status: Final result Specimen: Swab from Nasopharynx Updated: 06/17/21 0009    Narrative:      The following orders were created for panel order COVID PRE-OP / PRE-PROCEDURE SCREENING ORDER (NO ISOLATION) - Swab, Nasopharynx.  Procedure                               Abnormality         Status                     ---------                               -----------         ------                     COVID-19,CEPHEID,EDILSON IN-...[[id number]]  Normal              Final result                 Please view results for these tests on the individual orders.    COVID-19,CEPHEID,EDILSON IN-HOUSE(OR EMERGENT/ADD-ON),NP SWAB IN TRANSPORT MEDIA 3-4 HR TAT - Swab, Nasopharynx [[id number]]  (Normal) Collected: 06/16/21 2243    Lab Status: Final result Specimen: Swab from Nasopharynx Updated: 06/17/21 0009     COVID19 Not Detected    Narrative:      Fact sheet for providers: https://www.fda.gov/media/294218/download     Fact sheet for patients: https://www.fda.gov/media/040742/download  Fact sheet for providers: https://www.fda.gov/media/259688/download     Fact sheet for patients: https://www.fda.gov/media/845234/download          Adult Transthoracic Echo Complete W/ Cont if Necessary Per Protocol (With Agitated Saline)    Result Date: 6/17/2021  · Left ventricular wall thickness is consistent with mild concentric hypertrophy. · Estimated left ventricular EF = 60% Left ventricular ejection fraction appears to be 61 - 65%. Left ventricular systolic function is normal. · Moderately reduced right ventricular systolic function noted. · Left atrial volume is moderately increased. · The right atrial cavity is mildly dilated. · Estimated right ventricular systolic pressure from tricuspid regurgitation is normal  (<35 mmHg). Calculated right ventricular systolic pressure from tricuspid regurgitation is 30 mmHg. · Saline test results are negative. · Left ventricular diastolic function was indeterminate. · During the examination the underlying rhythm appears to be atrial fibrillation      CT Angiogram Neck    Result Date: 6/16/2021  CTA of the head and neck with AI analysis for LVO Technique: CT angio head following administration of 100 cc Isovue-370 IV contrast, including coronal and sagittal MIP 3-D reformatted images. Indication: Stroke, follow up Comparison: No pertinent prior study TECHNIQUE: Radiation dose reduction techniques included automated exposure control or exposure modulation based on body size. Radiation audit for number of CT and nuclear cardiology exams performed in the last year: 1.  Evaluation for a significant carotid arterial stenosis is based on the NASCET criteria. Findings:   CT angiography neck: The visualized aortic arch and its major branch vessels demonstrates scattered atherosclerotic calcification and moderate tortuosity. No flow limiting stenosis.. The right carotid bifurcation demonstrates prominent tortuosity. Calcification noted in the proximal right internal carotid artery. There is a stenosis of approximately 40-50% diameter stenosis. Scattered calcifications are demonstrated in the left bifurcation. Marked tortuosity of the left internal carotid artery. There is minimal calcific plaque in the proximal left internal carotid artery. No significant stenosis demonstrated. Extracranial vertebral arteries are of normal course and caliber. There is a dominant right vertebral artery. Both vertebral arteries contribute to the basilar artery.   CT angiography head: The intracranial portions of the internal carotid arteries demonstrate atherosclerotic calcification. No convincing evidence of a flow-limiting stenosis. MCA and SHREE vessels are patent bilaterally. There is atherosclerotic irregularity  in the A1 segment of the right anterior cerebral artery. Also demonstrated is a moderate stenosis of the M1 segment of the right middle cerebral artery. There is a symmetric distal runoff intracranially without evidence of aneurysm or flow-limiting stenosis. Basilar artery is patent. Bilateral posterior cerebral arteries demonstrate symmetric distal runoff without evidence of aneurysm or flow-limiting stenosis. Major dural venous sinuses show no evidence of filling defect.         1.  Prominent calcification in the proximal right internal carotid artery with a stenosis of approximately 40-50% diameter stenosis. 2.  Minimal calcification in the left carotid bifurcation without evidence of a significant stenosis. 3.  Atherosclerotic irregularity of the A1 segment of the right anterior cerebral artery. 4.  Moderate stenosis of the M1 segment of the right middle cerebral artery. Signer Name: Yasir Couch MD  Signed: 6/16/2021 10:39 PM  Workstation Name: [email or web address]  [facility]    MRI Brain Without Contrast    Result Date: 6/17/2021  EXAMINATION: MRI BRAIN WO CONTRAST-  INDICATION: Stroke, followup; R41.82-Altered mental status, unspecified; R29.898-Other symptoms and signs involving the musculoskeletal system.  TECHNIQUE: Multiplanar MRI of the brain without intravenous contrast administration.  COMPARISON: MRI brain dated 11/26/2012.  FINDINGS: No restriction on diffusion-weighted sequences to suggest acute ischemia. Midline structures are symmetric without evidence of mass, mass effect or midline shift. Ventricles and sulci demonstrate prominence towards the vertex of at least mild generalized atrophy and/or age related volume loss with mild to moderate increased T2 and FLAIR signal findings in the supratentorial white matter of chronic small vessel ischemic disease. No susceptibility artifact on susceptibility weighted imaging. Globes and orbits retain normal T2 signal characteristics.  Paranasal sinuses and mastoid air cells are grossly clear and well pneumatized. Pituitary and sella within normal limits. Cervicomedullary junction is widely patent.      1. No acute infarction. 2. Mild to moderate chronic small vessel ischemic disease along with mild to moderate generalized atrophy and/or age-related volume loss.  D:  06/17/2021 E:  06/17/2021    This report was finalized on 6/17/2021 5:18 PM by Dr. Deep Ramirez.      XR Chest 1 View    Result Date: 6/16/2021  CR Chest 1 Vw INDICATION: Strokelike symptoms. COMPARISON:  11/9/2020 FINDINGS: Single portable AP view(s) of the chest.  Heart remains enlarged. Left-sided coronary stent is noted. There is atherosclerotic disease in the aorta. There is mild chronic scarring or atelectasis at the left lung base. Lungs otherwise are clear. No pneumothorax is seen. Pulmonary vascularity is normal.     No acute cardiopulmonary findings. Signer Name: Eduard Fiore MD  Signed: 6/16/2021 10:43 PM  Workstation Name: [email or web address]  [facility]    CT Head Without Contrast Stroke Protocol    Result Date: 6/16/2021  CT Head WO Code Stroke HISTORY: Aphasia and right-sided weakness TECHNIQUE: Axial unenhanced head CT. Radiation dose reduction techniques included automated exposure control or exposure modulation based on body size. Count of known CT and cardiac nuc med studies performed in previous 12 months: 1. Time of scan: 9:37 PM Report called to [facility] at 10:03 PM. COMPARISON: November 9, 2020 FINDINGS: The ventricles are generous in size. Cortical sulci are correspondingly prominent. No extraaxial fluid collections are seen. Redemonstrated is a densely calcified meningioma in the left frontal area. No parenchymal or subarachnoid hemorrhage is present. Periventricular hypodensities are demonstrated which are nonspecific but likely represent white matter microvascular change in a patient of this age. No CT evidence of mass or acute  infarct. The mastoid air cells are clear. The visualized paranasal sinuses are clear. Orbital structures demonstrate no acute findings.     Impression: 1. No clearly acute intracranial pathology demonstrated. 2. Generalized cerebral atrophy and nonspecific periventricular hypodensities which are thought to represent white matter microvascular change. 3. Calcified meningioma in the left frontal area. No significant interval change from study of November 9, 2020 Signer Name: Yasir Couch MD  Signed: 6/16/2021 10:07 PM  Workstation Name: [email or web address]-  [facility]    CT Angiogram Head w AI Analysis of LVO    Result Date: 6/16/2021  CTA of the head and neck with AI analysis for LVO Technique: CT angio head following administration of 100 cc Isovue-370 IV contrast, including coronal and sagittal MIP 3-D reformatted images. Indication: Stroke, follow up Comparison: No pertinent prior study TECHNIQUE: Radiation dose reduction techniques included automated exposure control or exposure modulation based on body size. Radiation audit for number of CT and nuclear cardiology exams performed in the last year: 1.  Evaluation for a significant carotid arterial stenosis is based on the NASCET criteria. Findings:   CT angiography neck: The visualized aortic arch and its major branch vessels demonstrates scattered atherosclerotic calcification and moderate tortuosity. No flow limiting stenosis.. The right carotid bifurcation demonstrates prominent tortuosity. Calcification noted in the proximal right internal carotid artery. There is a stenosis of approximately 40-50% diameter stenosis. Scattered calcifications are demonstrated in the left bifurcation. Marked tortuosity of the left internal carotid artery. There is minimal calcific plaque in the proximal left internal carotid artery. No significant stenosis demonstrated. Extracranial vertebral arteries are of normal course and caliber. There is a dominant right  vertebral artery. Both vertebral arteries contribute to the basilar artery.   CT angiography head: The intracranial portions of the internal carotid arteries demonstrate atherosclerotic calcification. No convincing evidence of a flow-limiting stenosis. MCA and SHREE vessels are patent bilaterally. There is atherosclerotic irregularity in the A1 segment of the right anterior cerebral artery. Also demonstrated is a moderate stenosis of the M1 segment of the right middle cerebral artery. There is a symmetric distal runoff intracranially without evidence of aneurysm or flow-limiting stenosis. Basilar artery is patent. Bilateral posterior cerebral arteries demonstrate symmetric distal runoff without evidence of aneurysm or flow-limiting stenosis. Major dural venous sinuses show no evidence of filling defect.         1.  Prominent calcification in the proximal right internal carotid artery with a stenosis of approximately 40-50% diameter stenosis. 2.  Minimal calcification in the left carotid bifurcation without evidence of a significant stenosis. 3.  Atherosclerotic irregularity of the A1 segment of the right anterior cerebral artery. 4.  Moderate stenosis of the M1 segment of the right middle cerebral artery. Signer Name: Yasir Couch MD  Signed: 6/16/2021 10:39 PM  Workstation Name: [email or web address]  [facility]    CT CEREBRAL PERFUSION WITH & WITHOUT CONTRAST    Result Date: 6/16/2021  CT CEREBRAL PERFUSION W WO CONTRAST HISTORY: Acute neuro deficit TECHNIQUE: Axial CT images of the brain without and with intravenous contrast using cerebral perfusion protocol. Post-processing parametric maps were created using RAPID software and reviewed. Radiation dose reduction techniques included automated exposure control or exposure modulation based on body size. CT and nuclear cardiology exams in the last 12 months: 1. COMPARISON: No pertinent prior study FINDINGS: Arterial input function is optimal. *   Perfusion parameters: *  Ischemic tissue volume (Tmax > 6 sec.): 0 mL. *  Infarct core volume (CBF < 30%): 0 mL. *  Mismatch (penumbra) volume: 0 mL. *  Mismatch ratio: 0. *  Location/territory: Not applicable. COLLATERAL CIRCULATION PARAMETERS: *  Volume poor collateral perfusion (Tmax > 10 sec.): 0 mL. *  Hypoperfusion index (Tmax >10 sec./Tmax > 6 sec.): 0. *  CBV Index (rCBV in Tmax > 6 sec. region): Not applicable. *  The T max greater than 4 seconds is 22 mL. This finding suggests mild ischemic change in the right occipital lobe and in the left parietal lobe.     No evidence of acute infarct. The T max greater than 4 seconds is 22 mL suggesting mild ischemic change in the right occipital lobe and left parietal lobe. Signer Name: Yasir Couch MD  Signed: 6/16/2021 10:14 PM  Workstation Name: [facility]  [facility]      Results for orders placed during the hospital encounter of 02/14/19    Duplex Carotid Ultrasound CAR    Interpretation Summary  · Right internal carotid artery stenosis of 0-49%.  · Left internal carotid artery stenosis of 0-49%.  · Minimal bilateral carotid atherosclerosis      Results for orders placed during the hospital encounter of 02/14/19    Duplex Carotid Ultrasound CAR    Interpretation Summary  · Right internal carotid artery stenosis of 0-49%.  · Left internal carotid artery stenosis of 0-49%.  · Minimal bilateral carotid atherosclerosis      Results for orders placed during the hospital encounter of 06/16/21    Adult Transthoracic Echo Complete W/ Cont if Necessary Per Protocol (With Agitated Saline)    Interpretation Summary  · Left ventricular wall thickness is consistent with mild concentric hypertrophy.  · Estimated left ventricular EF = 60% Left ventricular ejection fraction appears to be 61 - 65%. Left ventricular systolic function is normal.  · Moderately reduced right ventricular systolic function noted.  · Left atrial volume is moderately  increased.  · The right atrial cavity is mildly dilated.  · Estimated right ventricular systolic pressure from tricuspid regurgitation is normal (<35 mmHg). Calculated right ventricular systolic pressure from tricuspid regurgitation is 30 mmHg.  · Saline test results are negative.  · Left ventricular diastolic function was indeterminate.  · During the examination the underlying rhythm appears to be atrial fibrillation      Plan for Follow-up of Pending Labs/Results:     Discharge Details        Discharge Medications      New Medications      Instructions Start Date   clopidogrel 75 MG tablet  Commonly known as: PLAVIX   75 mg, Oral, Daily   Start Date: June 20, 2021        Changes to Medications      Instructions Start Date   rosuvastatin 20 MG tablet  Commonly known as: CRESTOR  What changed: how much to take   20 mg, Oral, Nightly         Continue These Medications      Instructions Start Date   carvedilol 12.5 MG tablet  Commonly known as: COREG   12.5 mg, Oral, 2 Times Daily With Meals      ERNIE-100 PO   100 mg, Oral, Daily      ferrous sulfate 325 (65 FE) MG tablet   325 mg, Oral, Daily With Breakfast      folic acid 800 MCG tablet  Commonly known as: FOLVITE   800 mcg, Oral, Daily      levothyroxine 50 MCG tablet  Commonly known as: SYNTHROID, LEVOTHROID   50 mcg, Oral, Daily      mirtazapine 30 MG tablet  Commonly known as: REMERON   15 mg, Oral, Nightly, 1/2 TABLET      multivitamin with minerals tablet tablet   1 tablet, Oral, Daily      thiamine 100 MG tablet  Commonly known as: VITAMIN B-1   100 mg, Oral, Daily      Xarelto 15 MG tablet  Generic drug: rivaroxaban   TAKE 1 TABLET BY MOUTH DAILY WITH DINNER. PLEASE CALL TO SCHEDULE AN APPT OR FURTHER REFILLS FROM PCP             Allergies   Allergen Reactions   • Atorvastatin Myalgia         Discharge Disposition:      Diet:  Hospital:  Diet Order   Procedures   • Diet Regular; Cardiac       Activity:      Restrictions or Other Recommendations:         CODE  STATUS:    Code Status and Medical Interventions:   Ordered at: 06/17/21 0254     Limited Support to NOT Include:    Intubation     Code Status:    No CPR     Medical Interventions (Level of Support Prior to Arrest):    Limited       Future Appointments   Date Time Provider Department Center   4/13/2022  2:30 PM Jennifer Morgan MD [facility]                 Maddie Sharif MD  06/19/21      Time Spent on Discharge:  I spent  35 minutes on this discharge activity which included: face-to-face encounter with the patient, reviewing the data in the system, coordination of the care with the nursing staff as well as consultants, documentation, and entering orders.

## 2021-06-18 NOTE — PROGRESS NOTES
Stroke Progress Note       Chief Complaint:  Altered mental status    Subjective     Subjective:  Resting in bed. No new complaints. Feels back to baseline.    Review of Systems   Unable to perform ROS: Dementia        Objective      Temp:  [97.5 °F (36.4 °C)-97.9 °F (36.6 °C)] 97.6 °F (36.4 °C)  Heart Rate:  [71-88] 81  Resp:  [16-17] 17  BP: (138-185)/() 138/92    Neurological Exam  Mental Status  Awake and alert. Oriented only to person and place. Speech is normal. Language is fluent with no aphasia.    Cranial Nerves  CN II: Visual fields full to confrontation.  CN III, IV, VI: Extraocular movements intact bilaterally.  CN VII: Full and symmetric facial movement.  CN XI: Shoulder shrug strength is normal.  CN XII: Tongue midline without atrophy or fasciculations.    Motor  Normal muscle bulk throughout. No fasciculations present. Normal muscle tone. Strength is 5/5 throughout all four extremities.    Sensory  Light touch is normal in upper and lower extremities.     Coordination  No dysmetria.    Gait  Not observed.      Physical Exam  Vitals reviewed.   Constitutional:       General: She is awake.      Appearance: Normal appearance.   HENT:      Head: Normocephalic and atraumatic.      Mouth/Throat:      Mouth: Mucous membranes are moist.   Eyes:      Extraocular Movements: Extraocular movements intact.   Cardiovascular:      Rate and Rhythm: Normal rate.   Pulmonary:      Effort: Pulmonary effort is normal.   Skin:     General: Skin is warm and dry.   Neurological:      Mental Status: She is alert. Mental status is at baseline.      Deep Tendon Reflexes: Strength normal.   Psychiatric:         Mood and Affect: Mood normal.         Speech: Speech normal.         Results Review:    I reviewed the patient's new clinical results.    Lab Results (last 24 hours)     Procedure Component Value Units Date/Time    Basic Metabolic Panel [[id number]]  (Normal) Collected: 06/18/21 0734    Specimen: Blood Updated:  06/18/21 0838     Glucose 97 mg/dL      BUN 13 mg/dL      Creatinine 0.75 mg/dL      Sodium 137 mmol/L      Potassium 3.7 mmol/L      Chloride 107 mmol/L      CO2 23.0 mmol/L      Calcium 8.5 mg/dL      eGFR Non African Amer 72 mL/min/1.73      BUN/Creatinine Ratio 17.3     Anion Gap 7.0 mmol/L     Narrative:      GFR Normal >60  Chronic Kidney Disease <60  Kidney Failure <15      CBC (No Diff) [[id number]]  (Abnormal) Collected: 06/18/21 0734    Specimen: Blood Updated: 06/18/21 0808     WBC 4.61 10*3/mm3      RBC 3.49 10*6/mm3      Hemoglobin 11.9 g/dL      Hematocrit 36.6 %      .9 fL      MCH 34.1 pg      MCHC 32.5 g/dL      RDW 13.2 %      RDW-SD 51.2 fl      MPV 10.3 fL      Platelets 118 10*3/mm3     POC Glucose Once [[id number]]  (Normal) Collected: 06/18/21 0559    Specimen: Blood Updated: 06/18/21 0601     Glucose 89 mg/dL     POC Glucose Once [[id number]]  (Normal) Collected: 06/17/21 2346    Specimen: Blood Updated: 06/17/21 2349     Glucose 102 mg/dL         CT Angiogram Neck    Result Date: 6/16/2021    1.  Prominent calcification in the proximal right internal carotid artery with a stenosis of approximately 40-50% diameter stenosis. 2.  Minimal calcification in the left carotid bifurcation without evidence of a significant stenosis. 3.  Atherosclerotic irregularity of the A1 segment of the right anterior cerebral artery. 4.  Moderate stenosis of the M1 segment of the right middle cerebral artery. Signer Name: Yasir Couch MD  Signed: 6/16/2021 10:39 PM  Workstation Name: [id number]  [facility]    MRI Brain Without Contrast    Result Date: 6/17/2021  1. No acute infarction. 2. Mild to moderate chronic small vessel ischemic disease along with mild to moderate generalized atrophy and/or age-related volume loss.  D:  06/17/2021 E:  06/17/2021    This report was finalized on 6/17/2021 5:18 PM by Dr. Deep Ramirez.      XR Chest 1 View    Result Date: 6/16/2021  No acute  cardiopulmonary findings. Signer Name: Eduard Fiore MD  Signed: 6/16/2021 10:43 PM  Workstation Name: [facility]    CT Head Without Contrast Stroke Protocol    Result Date: 6/16/2021  Impression: 1. No clearly acute intracranial pathology demonstrated. 2. Generalized cerebral atrophy and nonspecific periventricular hypodensities which are thought to represent white matter microvascular change. 3. Calcified meningioma in the left frontal area. No significant interval change from study of November 9, 2020 Signer Name: Yasir Couch MD  Signed: 6/16/2021 10:07 PM  Workstation Name: [facility]    CT Angiogram Head w AI Analysis of LVO    Result Date: 6/16/2021    1.  Prominent calcification in the proximal right internal carotid artery with a stenosis of approximately 40-50% diameter stenosis. 2.  Minimal calcification in the left carotid bifurcation without evidence of a significant stenosis. 3.  Atherosclerotic irregularity of the A1 segment of the right anterior cerebral artery. 4.  Moderate stenosis of the M1 segment of the right middle cerebral artery. Signer Name: Yasir Couch MD  Signed: 6/16/2021 10:39 PM  Workstation Name: [facility]    CT CEREBRAL PERFUSION WITH & WITHOUT CONTRAST    Result Date: 6/16/2021  No evidence of acute infarct. The T max greater than 4 seconds is 22 mL suggesting mild ischemic change in the right occipital lobe and left parietal lobe. Signer Name: Yasir Couch MD  Signed: 6/16/2021 10:14 PM  Workstation Name: [facility]    Results for orders placed during the hospital encounter of 06/16/21    Adult Transthoracic Echo Complete W/ Cont if Necessary Per Protocol (With Agitated Saline)    Interpretation Summary  · Left ventricular wall thickness is consistent with mild concentric hypertrophy.  · Estimated left ventricular EF = 60%  Left ventricular ejection fraction appears to be 61 - 65%. Left ventricular systolic function is normal.  · Moderately reduced right ventricular systolic function noted.  · Left atrial volume is moderately increased.  · The right atrial cavity is mildly dilated.  · Estimated right ventricular systolic pressure from tricuspid regurgitation is normal (<35 mmHg). Calculated right ventricular systolic pressure from tricuspid regurgitation is 30 mmHg.  · Saline test results are negative.  · Left ventricular diastolic function was indeterminate.  · During the examination the underlying rhythm appears to be atrial fibrillation        Assessment/Plan     Assessment/Plan:  This is a 89-year-old right-handed white female with known diagnosis of atrial fibrillation, on Xarelto, hypertension, hyperlipidemia, coronary artery disease with stent, who presented with an acute episode of altered mentation and right upper extremity weakness.      1. TIA  - change ASA to Plavix  -continue Xarelto  - echo EF 60%, negative saline test, left atrial volume moderately increased  - PT/OT/SLP  -continue statin  - normal BP goals  - home with home health    2. Cerebral atherosclerosis  - management as above    3. UTI  -management per primary team    Recommend monitor inpatient one more day then discharge home with Home  Health. Case discussed with Dr Berman, patient and family.     Zina Hinton, APRN  06/18/21  11:00 EDT

## 2021-06-18 NOTE — PROGRESS NOTES
[facility] Medicine Services  PROGRESS NOTE    Patient Name: Sarah Kennedy  : 1929  MRN: [id number]    Date of Admission: 2021  Primary Care Physician: Martha Montague PA    Subjective   Subjective     CC:  Acute onset RUE weakness    HPI:  Pt's son hesitant about his ability to care for the patient at home if she were to go home today. No issues overnight.     ROS:  Gen- No fevers, chills  CV- No chest pain, palpitations  Resp- No cough, dyspnea  GI- No N/V/D, abd pain        Objective   Objective     Vital Signs:   Temp:  [97.5 °F (36.4 °C)-97.9 °F (36.6 °C)] 97.6 °F (36.4 °C)  Heart Rate:  [71-88] 81  Resp:  [16-17] 17  BP: (138-185)/() 138/92  Total (NIH Stroke Scale): 0     Physical Exam:  Constitutional: No acute distress, awake, alert  HENT: NCAT, mucous membranes moist  Respiratory: Clear to auscultation bilaterally, respiratory effort normal   Cardiovascular: RRR, no murmurs, rubs, or gallops  Gastrointestinal: Positive bowel sounds, soft, nontender, nondistended  Musculoskeletal: No bilateral ankle edema  Psychiatric: Appropriate affect, cooperative  Neurologic: Oriented x 3, strength symmetric in all extremities, Cranial Nerves grossly intact to confrontation, speech clear  Skin: No rashes    Results Reviewed:  Results from last 7 days   Lab Units 21  0734 21  0855 21  2157 21  2146   WBC 10*3/mm3 4.61 5.60 6.39  --    HEMOGLOBIN g/dL 11.9* 12.3 12.9  --    HEMATOCRIT % 36.6 40.0 39.7  --    PLATELETS 10*3/mm3 118* 112* 138*  --    INR   --   --   --  3.4*     Results from last 7 days   Lab Units 21  0734 21  0857 21  2242   SODIUM mmol/L 137 135* 139   POTASSIUM mmol/L 3.7 4.3 3.7   CHLORIDE mmol/L 107 107 104   CO2 mmol/L 23.0 16.0* 22.0   BUN mg/dL 13 12 13   CREATININE mg/dL 0.75 0.81 0.91   GLUCOSE mg/dL 97 97 112*   CALCIUM mg/dL 8.5 9.3 9.1   ALT (SGPT) U/L  --  7 6   AST (SGOT) U/L  --  21 19   TROPONIN T  ng/mL  --   --  <0.010     Estimated Creatinine Clearance: 35 mL/min (by C-G formula based on SCr of 0.75 mg/dL).    Microbiology Results Abnormal     Procedure Component Value - Date/Time    COVID PRE-OP / PRE-PROCEDURE SCREENING ORDER (NO ISOLATION) - Swab, Nasopharynx [[id number]]  (Normal) Collected: 06/16/21 2243    Lab Status: Final result Specimen: Swab from Nasopharynx Updated: 06/17/21 0009    Narrative:      The following orders were created for panel order COVID PRE-OP / PRE-PROCEDURE SCREENING ORDER (NO ISOLATION) - Swab, Nasopharynx.  Procedure                               Abnormality         Status                     ---------                               -----------         ------                     COVID-19,CEPHEID,EDILSON IN-...[[id number]]  Normal              Final result                 Please view results for these tests on the individual orders.    COVID-19,CEPHEID,EDILSON IN-HOUSE(OR EMERGENT/ADD-ON),NP SWAB IN TRANSPORT MEDIA 3-4 HR TAT - Swab, Nasopharynx [[id number]]  (Normal) Collected: 06/16/21 2243    Lab Status: Final result Specimen: Swab from Nasopharynx Updated: 06/17/21 0009     COVID19 Not Detected    Narrative:      Fact sheet for providers: https://www.fda.gov/media/887902/download     Fact sheet for patients: https://www.fda.gov/media/453456/download  Fact sheet for providers: https://www.fda.gov/media/814297/download     Fact sheet for patients: https://www.fda.gov/media/355572/download          Imaging Results (Last 24 Hours)     Procedure Component Value Units Date/Time    MRI Brain Without Contrast [[id number]] Collected: 06/17/21 0746     Updated: 06/17/21 1721    Narrative:      EXAMINATION: MRI BRAIN WO CONTRAST-     INDICATION: Stroke, followup; R41.82-Altered mental status, unspecified;  R29.898-Other symptoms and signs involving the musculoskeletal system.      TECHNIQUE: Multiplanar MRI of the brain without intravenous contrast  administration.     COMPARISON: MRI brain dated  11/26/2012.     FINDINGS: No restriction on diffusion-weighted sequences to suggest  acute ischemia. Midline structures are symmetric without evidence of  mass, mass effect or midline shift. Ventricles and sulci demonstrate  prominence towards the vertex of at least mild generalized atrophy  and/or age related volume loss with mild to moderate increased T2 and  FLAIR signal findings in the supratentorial white matter of chronic  small vessel ischemic disease. No susceptibility artifact on  susceptibility weighted imaging. Globes and orbits retain normal T2  signal characteristics. Paranasal sinuses and mastoid air cells are  grossly clear and well pneumatized. Pituitary and sella within normal  limits. Cervicomedullary junction is widely patent.       Impression:      1. No acute infarction.  2. Mild to moderate chronic small vessel ischemic disease along with  mild to moderate generalized atrophy and/or age-related volume loss.     D:  06/17/2021  E:  06/17/2021         This report was finalized on 6/17/2021 5:18 PM by Dr. Deep Ramirez.             Results for orders placed during the hospital encounter of 06/16/21    Adult Transthoracic Echo Complete W/ Cont if Necessary Per Protocol (With Agitated Saline)    Interpretation Summary  · Left ventricular wall thickness is consistent with mild concentric hypertrophy.  · Estimated left ventricular EF = 60% Left ventricular ejection fraction appears to be 61 - 65%. Left ventricular systolic function is normal.  · Moderately reduced right ventricular systolic function noted.  · Left atrial volume is moderately increased.  · The right atrial cavity is mildly dilated.  · Estimated right ventricular systolic pressure from tricuspid regurgitation is normal (<35 mmHg). Calculated right ventricular systolic pressure from tricuspid regurgitation is 30 mmHg.  · Saline test results are negative.  · Left ventricular diastolic function was indeterminate.  · During the examination  the underlying rhythm appears to be atrial fibrillation      I have reviewed the medications:  Scheduled Meds:atorvastatin, 80 mg, Oral, Nightly  carvedilol, 12.5 mg, Oral, BID With Meals  cefTRIAXone, 1 g, Intravenous, Q24H  [START ON 6/19/2021] clopidogrel, 75 mg, Oral, Daily  docusate sodium, 100 mg, Oral, Daily  ferrous sulfate, 325 mg, Oral, Daily With Breakfast  folic acid, 800 mcg, Oral, Daily  levothyroxine, 50 mcg, Oral, Q AM  rivaroxaban, 15 mg, Oral, Daily With Dinner  sodium chloride, 10 mL, Intravenous, Q12H  sodium chloride, 10 mL, Intravenous, Q12H  thiamine, 100 mg, Oral, Daily      Continuous Infusions:   PRN Meds:.•  aspirin  •  melatonin  •  sodium chloride  •  sodium chloride  •  sodium chloride    Assessment/Plan   Assessment & Plan     Active Hospital Problems    Diagnosis  POA   • **Suspected cerebrovascular accident (CVA) [R09.89]  Yes   • Altered mental status [R41.82]  Yes   • Systolic dysfunction [I51.9]  Yes   • Coronary artery disease [I25.10]  Yes   • Hypothyroidism [E03.9]  Yes   • Hyperlipidemia [E78.5]  Yes   • Hx-TIA (transient ischemic attack) [Z86.73]  Not Applicable   • HTN (hypertension) [I10]  Yes   • GERD (gastroesophageal reflux disease) [K21.9]  Yes   • Permanent atrial fibrillation (CMS/HCC) [I48.21]  Yes      Resolved Hospital Problems   No resolved problems to display.        Brief Hospital Course to date:  Sarah Kennedy is a 89 y.o. female  w/ a hx of Atrial Fibrillation, CHF, CAD, HTN, HLD, hypothyroidism, GERD, remote TIA, dementia who presented to the ED w/ c/o altered mental status and right sided weakness.    TIA  --sudden onset confusion, aphasia and RUE weakness  -- CT Head, CTA Head/neck and CT cerebral perfusion unremarkable  -- MRI Brain unremarkable  -- ECHO with no evidence of PFO  -- Stroke Navigator following  -- Neurology consulted  -- pt,ot, speech and case mgt consult   -- d/c ASA and start on Plavix per Neuro, high dose statin   -- LDL 84,  tsh wnl  -- EKG c/w Afib; continue Xarelto   -- resume home BP meds        Pyuria, suspected UTI (POA)  -- Daughter reports history of similar presentation with previously being diagnosed with UTI  -- D3 of Rocephin, will d/c after today's dose unless UCx results show resistant pathogen  -- Urine culture PENDING        Atrial fibrillation  CAD s/p stents in remote past ()  HTN  HLD  Hx of TIA  -- EKG c/w Afib   -- routine Coreg and low dose statin held for now; high dose statin per Stroke   -- routine Xarelto continued      H/o systolic CHF  --EF of 25% on LHC in 2016  -- ECHO 2019; EF 55% w/ normal LVSF, RVSP 55mmg, mild-moderate mitral valve regurg and moderate tricuspid regurg  -- repeat ECHO with normal EF, no PFO.      Hypothyroidism   -tsh wnl  -continue synthroid      Alcohol use  -pt reports 1-2 drinks daily; also only oriented to name/ only  -per hx- daughter monitors and doesn't let her drink anymore  -ethanol level mildly elevated, so likely did have a drink on night of admission  -CIWA protocol started on admission  -continue routine thiamine and folic acid         DVT prophylaxis:  Medical and mechanical DVT prophylaxis orders are present.       Disposition: I expect the patient to be discharged home tomorrow if okay with Neurology    CODE STATUS:   Code Status and Medical Interventions:   Ordered at: 21 0254     Limited Support to NOT Include:    Intubation     Code Status:    No CPR     Medical Interventions (Level of Support Prior to Arrest):    Limited       Maddie Sharif MD  21

## 2021-06-18 NOTE — CONSULTS
Attempted to see Ms. Kennedy for diabetes education per stroke protocol.  Current A1c not available.  No history of diabetes noted or home diabetes meds.  Negative for stroke per notes.  Blood glucoses during hospital stay within range, except one.  Will sign off.

## 2021-06-18 NOTE — PLAN OF CARE
Goal Outcome Evaluation:  Plan of Care Reviewed With: patient        Progress: improving   Pt is alert and oriented X 3. Disoriented to time. Had episode of elevated BP. Carvedilol administered per APRN order.

## 2021-06-18 NOTE — CASE MANAGEMENT/SOCIAL WORK
Discharge Planning Assessment  [facility]     Patient Name: Sarah Kennedy  MRN: [id number]  Today's Date: 6/18/2021    Admit Date: 6/16/2021    Discharge Needs Assessment    No documentation.       Discharge Plan     Row Name 06/18/21 1045       Plan    Plan  Home with son's assistance and [facility]    Patient/Family in Agreement with Plan  yes    Plan Comments  Met with Ms. Kennedy and her son, Jemal, at the bedside, for discharge planning.  Ms. Kennedy is being disharged to home today.  Discussed home health and [facility].  She denied any DME needs.  She does have a rolling walker at home.  Ms. Kennedy will have assistance at home from Jemal.  She lives with her daughter, Stephanie Montague, but Stephanie is out of town currently.  She will return home next Tuesday.  Spoke with Stephanie yesterday, by phone, and Stephanie requested assistance with services that can help her to place her Mother in assisted living or memory care.  Referred patient and family to Yolanda with [facility].    Jemal is agreeable to discharge today and discharge plan.  Jemal is transporting his Mother home.    Final Discharge Disposition Code  06 - home with home health care        Continued Care and Services - Admitted Since 6/16/2021     Home Medical Care Coordination complete    Service Provider Request Status Selected Services Address Phone Fax Patient Preferred    [facility]   Selected Home Health Services [address] [place] [id number] [phone] --              Expected Discharge Date and Time     Expected Discharge Date Expected Discharge Time    Jun 19, 2021         Demographic Summary    No documentation.       Functional Status    No documentation.       Psychosocial    No documentation.       Abuse/Neglect    No documentation.       Legal    No documentation.       Substance Abuse    No documentation.       Patient Forms    No documentation.           Samara TROY  NIMESH Scott

## 2021-06-18 NOTE — THERAPY TREATMENT NOTE
Acute Care - Speech Language Pathology Treatment Note  [facility]     Patient Name: Sarah Kennedy  : 1929  MRN: [id number]  Today's Date: 2021               Admit Date: 2021     Visit Dx:    ICD-10-CM ICD-9-CM   1. Cognitive communication deficit  R41.841 799.52   2. Altered mental status, unspecified altered mental status type  R41.82 780.97   3. Right arm weakness  R29.898 729.89     Patient Active Problem List   Diagnosis   • Permanent atrial fibrillation (CMS/HCC)   • NSTEMI (non-ST elevated myocardial infarction) (CMS/[facility])   • Subtherapeutic international normalized ratio (INR)   • Hx-TIA (transient ischemic attack)   • HTN (hypertension)   • Hyperlipidemia   • Hypothyroidism   • GERD (gastroesophageal reflux disease)   • H/O non-Hodgkin's lymphoma   • Coronary artery disease   • Syncope   • Transient ischemic attack   • Glaucoma   • Chronic back pain   • Hypotension   • BRYSON (acute kidney injury) (CMS/[facility])   • Systolic dysfunction   • Suspected cerebrovascular accident (CVA)   • Altered mental status     Past Medical History:   Diagnosis Date   • A-fib (CMS/[facility])    • Cancer (CMS/[facility])    • Chronic back pain 10/17/2016    Chronic back and left leg pain, pain management per Dr. Lynch.   • Coronary artery disease 10/17/2016    History of myocardial infarction, . Left heart catheterization, , to unknown vessel. Nuclear stress test, 2010:  Very small sized defect and mild intensity in apical portion of anterior septal region.  EF 68%. Non-ST elevation MI, 2016 with cardiac catheterization by Lobito Steele MD with placement of a drug-eluting stent to the mid-LAD, drug-eluting to the mid-RCA, and a drug-eluting stent to the PDA.  The stent in the LAD, 2.5 x 28 mm Xience drug-eluting stent; mid-right, 3.0 x 33 mm Xience drug-eluting stent.  PDA, 2.5 x 28 mm Xience drug-eluting stent.  EF 30% with anterolateral apical and inferior hypokinesis.     • Dementia (CMS/[facility])    •  Disease of thyroid gland    • Heart attack (CMS/HCC)    • History of UTI    • Hyperlipidemia    • Hypertension    • Lymphoma (CMS/HCC)    • Lymphoma (CMS/HCC)     Non-Hodgkin’s lymphoma   • Stroke (CMS/HCC)    • Syncope 10/17/2016    Presyncope      Past Surgical History:   Procedure Laterality Date   • CARDIAC CATHETERIZATION N/A 10/12/2016    Procedure: Left Heart Cath;  Surgeon: Nikia Chambers MD;  Location: [facility] CATH INVASIVE LOCATION;  Service:    • ESOPHAGEAL DILATATION     • NASAL SEPTAL RECONSTRUCTION     • TONSILLECTOMY          SLP EVALUATION (last 72 hours)      SLP SLC Evaluation     Row Name 06/17/21 0930                   Communication Assessment/Intervention    Document Type  evaluation  -EN        Subjective Information  no complaints  -EN        Patient Observations  alert;cooperative;agree to therapy  -EN        Patient/Family/Caregiver Comments/Observations  son present   -EN        Care Plan Review  evaluation/treatment results reviewed;care plan/treatment goals reviewed;risks/benefits reviewed;current/potential barriers reviewed;patient/other agree to care plan  -EN        Care Plan Review, Other Participant(s)  son  -EN        Patient Effort  adequate  -EN        Symptoms Noted During/After Treatment  none  -EN           General Information    Patient Profile Reviewed  yes  -EN        Pertinent History Of Current Problem  Pt adm w/ suspected CVA and AMS. Hx of a-fib, TIA, HTN, GERD, BRYSON, non-Hodgkin's lymphoma, heart attack, Alzheimer's, HLD, CHF, CAD.   -EN        Precautions/Limitations, Vision  WFL;for purposes of eval  -EN        Precautions/Limitations, Hearing  hearing impairment, bilaterally  -EN        Prior Level of Function-Communication  cognitive-linguistic impairment;other (see comments) unsure of baseline   -EN        Plans/Goals Discussed with  patient;family;agreed upon  -EN        Barriers to Rehab  cognitive status  -EN        Patient's Goals for Discharge  return to home  -EN         Family Goals for Discharge  family did not state  -EN           Pain    Additional Documentation  Pain Scale: FACES Pre/Post-Treatment (Group)  -EN           Pain Scale: FACES Pre/Post-Treatment    Pain: FACES Scale, Pretreatment  0-->no hurt  -EN        Posttreatment Pain Rating  0-->no hurt  -EN           Comprehension Assessment/Intervention    Comprehension Assessment/Intervention  Auditory Comprehension  -EN           Auditory Comprehension Assessment/Intervention    Auditory Comprehension (Communication)  mild impairment  -EN        Narrative Discourse  simple paragraph level;complex paragraph level;conversational level;mild impairment;other (see comments) hearing impairment could be impacting.   -EN        Auditory Comprehension Communication, Comment  Pt w/ mild comprehension defiicits during conversation. Unsure of baseline as son at bedside does not typically care for pt. Daughter (out of town) normally is caregiver. Son suspects baseline.   -EN           Expression Assessment/Intervention    Expression Assessment/Intervention  verbal expression  -EN           Verbal Expression Assessment/Intervention    Verbal Expression  mild impairment  -EN        Repetition  WFL;words  -EN        Responsive Naming  mild impairment;delayed responses  -EN        Confrontational Naming  WFL;high frequency  -EN        Spontaneous/Functional Words  WFL  -EN        Sentence Formulation  WFL  -EN        Conversational Discourse/Fluency  WFL  -EN        Verbal Expression, Comment  Occasional word finding deficits w/ responsive/divergent naming however could be impacted by cognitive status.   -EN           Oral Motor Structure and Function    Oral Motor Structure and Function  WFL  -EN           Oral Musculature and Cranial Nerve Assessment    Oral Motor General Assessment  WFL  -EN           Motor Speech Assessment/Intervention    Motor Speech Function  WFL  -EN           Cursory Voice Assessment/Intervention    Quality  and Resonance (Voice)  WFL  -EN           Cognitive Assessment Intervention- SLP    Cognitive Function (Cognition)  mild impairment  -EN        Orientation Status (Cognition)  WFL;person;place;awareness of basic personal information;time  -EN        Memory (Cognitive)  mild impairment;delayed;other (see comments) ? baseline level of function   -EN        Attention (Cognitive)  selective;sustained;WFL  -EN        Thought Organization (Cognitive)  concrete divergent;drawing conclusions;mild impairment  -EN        Reasoning (Cognitive)  analogies;absurdities;mild impairment  -EN        Problem Solving (Cognitive)  WFL  -EN        Executive Function (Cognition)  WFL  -EN        Pragmatics (Communication)  WFL  -EN        Right Hemisphere Function  WFL  -EN        Cognition, Comment  Pt w/ mild cognitive deficits. Given Alzheimer's dx suspect some deficits are baseline however unable to confidently confirm 2' son at bedside reporting he is not primary caregiver and is not around pt all the time. Reported that he suspects she is at her baseline.   -EN           SLP Clinical Impressions    SLP Diagnosis  Mild cognitive communication deficit  -EN        Rehab Potential/Prognosis  good  -EN        SLC Criteria for Skilled Therapy Interventions Met  yes  -EN        Functional Impact  functional impact in ADLs;functional impact in social situations;difficulty communicating wants, needs;difficulty communicating in an emergency;difficulty in expressing complex messages  -EN        Plan for Continued Treatment (SLP)  F/u for cognitive communication in therapy. Suspect pt is at baseline level of functioning for cog-comm skills however needs confirmation as son at bedside unsure as he lives out of town.   -EN           Recommendations    Therapy Frequency (SLP SLC)  5 days per week  -EN        Predicted Duration Therapy Intervention (Days)  until discharge  -EN        Anticipated Discharge Disposition (SLP)  unknown;anticipate  therapy at next level of care  -EN          User Key  (r) = Recorded By, (t) = Taken By, (c) = Cosigned By    Initials Name Effective Dates    EN Pepper Livier THEO, MS, CFY-SLP 05/20/21 -              EDUCATION  The patient has been educated in the following areas:     Cognitive Impairment.    SLP Recommendation and Plan         Pt making progress in skilled tx. Unknown D/C plans.              Plan of Care Reviewed With: patient  Progress: improving      SLP GOALS     Row Name 06/18/21 0815 06/17/21 0930          Comprehend Narrative Discourse Goal 1 (SLP)    Improve Comprehension of Narrative Discourse Goal 1 (SLP)  respond appropriately to simple conversational statements;respond appropriately to abstract conversational statements;respond appropriately to simple conversational questions;90%;with minimal cues (75-90%)  -HG  respond appropriately to simple conversational statements;respond appropriately to abstract conversational statements;respond appropriately to simple conversational questions;90%;with minimal cues (75-90%)  -EN     Time Frame (Comprehend Narrative Discourse Goal 1, SLP)  short term goal (STG)  -HG  short term goal (STG)  -EN     Progress (Ability to Comprehend Narrative Discourse Goal 1, SLP)  100%  -HG  --     Progress/Outcomes (Comprehend Narrative Discourse Goal 1, SLP)  good progress toward goal  -HG  --        Word Retrieval Skills Goal 1 (SLP)    Improve Word Retrieval Skills By Goal 1 (SLP)  responsive naming task;simple;completing open ended structured sentence;completing open ended unstructured sentence;completing a divergent task;completing a convergent task;90%;with minimal cues (75-90%)  -HG  responsive naming task;simple;completing open ended structured sentence;completing open ended unstructured sentence;completing a divergent task;completing a convergent task;90%;with minimal cues (75-90%)  -EN     Time Frame (Word Retrieval Goal 1, SLP)  short term goal (STG)  -HG  short term goal  (STG)  -EN     Progress (Word Retrieval Skills Goal 1, SLP)  90%;independently (over 90% accuracy)  -HG  --     Progress/Outcomes (Word Retrieval Goal 1, SLP)  good progress toward goal  -HG  --     Comment (Word Retrieval Goal 1, SLP)  responsive naming and open ended sentences.   -HG  --        Memory Skills Goal 1 (SLP)    Improve Memory Skills Through Goal 1 (SLP)  recall details of the day;use memory strategies;recalling related word lists immediately;recalling unrelated word lists immediately;90%;with minimal cues (75-90%)  -HG  recall details of the day;use memory strategies;recalling related word lists immediately;recalling unrelated word lists immediately;90%;with minimal cues (75-90%)  -EN     Time Frame (Memory Skills Goal 1, SLP)  short term goal (STG)  -HG  short term goal (STG)  -EN     Progress (Memory Skills Goal 1, SLP)  80%;90%;with minimal cues (75-90%)  -HG  --     Progress/Outcomes (Memory Skills Goal 1, SLP)  good progress toward goal  -HG  --     Comment (Memory Skills Goal 1, SLP)  Aware of stay, 3 related words: pt was 0/3 I'ly- with visual and verbal cue, pt was 3/3   -HG  --        Organizational Skills Goal 1 (SLP)    Improve Thought Organization Through Goal 1 (SLP)  completing a divergent naming task;generating a list of items in a category;naming similarities and differences;drawing conclusions;completing a verbal sequencing task;90%;with minimal cues (75-90%)  -HG  completing a divergent naming task;generating a list of items in a category;naming similarities and differences;drawing conclusions;completing a verbal sequencing task;90%;with minimal cues (75-90%)  -EN     Time Frame (Thought Organization Skills Goal 1, SLP)  short term goal (STG)  -HG  short term goal (STG)  -EN     Progress (Thought Organization Skills Goal 1, SLP)  20%;with maximum cues (25-49%)  -HG  --     Progress/Outcomes (Thought Organization Skills Goal 1, SLP)  good progress toward goal  -HG  --     Comment  (Thought Organization Skills Goal 1, SLP)  Divergent naming: pt struggled to name beyond 2 a category I'ly.   -HG  --        Reasoning Goal 1 (SLP)    Improve Reasoning Through Goal 1 (SLP)  complete basic reasoning task;complete analogies;identify absurdities;90%;with minimal cues (75-90%)  -HG  complete basic reasoning task;complete analogies;identify absurdities;90%;with minimal cues (75-90%)  -EN     Time Frame (Reasoning Goal 1, SLP)  short term goal (STG)  -HG  short term goal (STG)  -EN     Progress (Reasoning Goal 1, SLP)  50%;70%;80%;with moderate cues (50-74%)  -HG  --     Progress/Outcomes (Reasoning Goal 1, SLP)  good progress toward goal  -HG  --     Comment (Reasoning Goal 1, SLP)  Analogies- 70-80% and absurdities- 50%.   -HG  --        Additional Goal 1 (SLP)    Additional Goal 1, SLP  LTG: Pt will improve cognitive communication in order to be able to better participate in conversations and care at this level.   -HG  LTG: Pt will improve cognitive communication in order to be able to better participate in conversations and care at this level.   -EN     Time Frame (Additional Goal 1, SLP)  by discharge  -HG  by discharge  -EN     Progress/Outcomes (Additional Goal 1, SLP)  good progress toward goal  -HG  --       User Key  (r) = Recorded By, (t) = Taken By, (c) = Cosigned By    Initials Name Provider Type     Beverly Delgado MS CCC-SLP Speech and Language Pathologist    EN Livier Pabon MS, CFY-SLP Speech and Language Pathologist                  Time Calculation:     Time Calculation- SLP     Row Name 06/18/21 0846             Time Calculation- SLP    SLP Start Time  0815  -HG      SLP Received On  06/18/21  -HG         Untimed Charges    35223-UV Treatment/ST Modification Prosth Aug Alter   30  -HG         Total Minutes    Untimed Charges Total Minutes  30  -HG       Total Minutes  30  -HG        User Key  (r) = Recorded By, (t) = Taken By, (c) = Cosigned By    Initials Name Provider Type      Beverly Delgado MS CCC-SLP Speech and Language Pathologist          Therapy Charges for Today     Code Description Service Date Service Provider Modifiers Qty    [id number]  ST TREATMENT SPEECH 2 6/18/2021 Beverly Delgado MS CCC-SLP GN 1          Patient was not wearing a face mask and did exhibit coughing during this therapy encounter.  Procedure performed was not aerosolizing, involved close contact (within 6 feet for at least 15 minutes or longer), and did not involve contact with infectious secretions or specimens.  Therapist used appropriate personal protective equipment including standard procedure mask.  Appropriate PPE was worn during the entire therapy session.  Hand hygiene was completed before and after therapy session.              MS BELEN Snow  6/18/2021

## 2021-06-18 NOTE — PLAN OF CARE
Goal Outcome Evaluation:  Plan of Care Reviewed With: patient        Progress: improving   SLP treatment completed. Will continue to address for cognition. Please see note for further details and recommendations.

## 2021-06-18 NOTE — PLAN OF CARE
Goal Outcome Evaluation:              Pt alert and oriented. VSS. No c/o pain. NIHSS 0 this shift. Plan is for d/c home tomorrow if medically ready.

## 2021-06-19 NOTE — PROGRESS NOTES
Stroke Progress Note       Chief Complaint:  Altered mental status    Subjective     Subjective:  Resting in bed. No new complaints. Feels back to baseline.  Wants to go home.    Review of Systems   Constitutional: Negative.    HENT: Negative.    Eyes: Negative.    Respiratory: Negative.    Cardiovascular: Negative.    Gastrointestinal: Negative.    Endocrine: Negative.    Genitourinary: Negative.    Musculoskeletal: Negative.    Skin: Negative.    Allergic/Immunologic: Negative.    Psychiatric/Behavioral: Negative.         Objective      Temp:  [97.6 °F (36.4 °C)-98 °F (36.7 °C)] 97.6 °F (36.4 °C)  Heart Rate:  [64-92] 72  Resp:  [16-18] 17  BP: (134-167)/(75-93) 161/78    Neurological Exam  Mental Status  Awake and alert. Oriented only to person and place. Speech is normal. Language is fluent with no aphasia. Attention and concentration are normal. Fund of knowledge is appropriate for level of education.    Cranial Nerves  CN II: Visual fields full to confrontation.  CN III, IV, VI: Extraocular movements intact bilaterally. Pupils equal round and reactive to light bilaterally.  CN V: Facial sensation is normal.  CN VII: Full and symmetric facial movement.  CN VIII: Equal hearing bilaterally.  CN IX, X: Palate elevates symmetrically  CN XI: Shoulder shrug strength is normal.  CN XII: Tongue midline without atrophy or fasciculations.    Motor  Normal muscle bulk throughout. No fasciculations present. Normal muscle tone. Strength is 5/5 throughout all four extremities.    Sensory  Light touch is normal in upper and lower extremities.     Coordination  No dysmetria.    Gait  Not observed.      Physical Exam  Vitals reviewed.   Constitutional:       General: She is awake.      Appearance: Normal appearance.   HENT:      Head: Normocephalic and atraumatic.      Mouth/Throat:      Mouth: Mucous membranes are moist.      Pharynx: Oropharynx is clear.   Eyes:      Extraocular Movements: Extraocular movements intact.       Conjunctiva/sclera: Conjunctivae normal.      Pupils: Pupils are equal, round, and reactive to light.   Cardiovascular:      Rate and Rhythm: Normal rate and regular rhythm.   Pulmonary:      Effort: Pulmonary effort is normal. No respiratory distress.   Skin:     General: Skin is warm and dry.   Neurological:      Mental Status: She is alert. Mental status is at baseline.      Deep Tendon Reflexes: Strength normal.   Psychiatric:         Mood and Affect: Mood normal.         Speech: Speech normal.         Behavior: Behavior normal.         Results Review:    No new imaging/labs.      No radiology results for the last day  Results for orders placed during the hospital encounter of 06/16/21    Adult Transthoracic Echo Complete W/ Cont if Necessary Per Protocol (With Agitated Saline)    Interpretation Summary  · Left ventricular wall thickness is consistent with mild concentric hypertrophy.  · Estimated left ventricular EF = 60% Left ventricular ejection fraction appears to be 61 - 65%. Left ventricular systolic function is normal.  · Moderately reduced right ventricular systolic function noted.  · Left atrial volume is moderately increased.  · The right atrial cavity is mildly dilated.  · Estimated right ventricular systolic pressure from tricuspid regurgitation is normal (<35 mmHg). Calculated right ventricular systolic pressure from tricuspid regurgitation is 30 mmHg.  · Saline test results are negative.  · Left ventricular diastolic function was indeterminate.  · During the examination the underlying rhythm appears to be atrial fibrillation        Assessment/Plan     Assessment/Plan:  This is a 89-year-old right-handed white female with known diagnosis of atrial fibrillation, on Xarelto, hypertension, hyperlipidemia, coronary artery disease with stent, who presented with an acute episode of altered mentation and right upper extremity weakness.      1. Transient ischemic attack.  -Plan to change ASA to Plavix 75 mg  daily  -continue Xarelto at her home dose.  - echo EF 60%, negative saline test, left atrial volume moderately increased  - PT/OT/SLP  -continue statin  - normal BP goals  - home with home health    2. Cerebral atherosclerosis  - management as above    3. UTI  -management per primary team    4.  Paroxysmal atrial fibrillation.  Continue her on home dose of Xarelto, rate and rhythm control.    5.  Essential hypertension.  Normal blood pressure goals for her, close follow-up with PCP.      Case was discussed with patient, nursing and the hospitalist.  Patient can be discharged home with outpatient follow-up with PCP.  Thank you for the consult.    Eddi Berman MD  06/19/21  11:04 EDT

## 2021-06-19 NOTE — PLAN OF CARE
Goal Outcome Evaluation:     Pt has rested intermittently t/o shift, AxOx4, has remained on room air, voiding without difficulty, NIHSS was 0 this shift

## 2021-06-19 NOTE — CASE MANAGEMENT/SOCIAL WORK
Case Management Discharge Note      Final Note: Plan is home with [facility] PT/OT. Son will transport. Patient has a rolling walker at home.    Provided Post Acute Provider List?: Yes  Post Acute Provider List: Home Health  Provided Post Acute Provider Quality & Resource List?: Yes  Post Acute Provider Quality and Resource List: Home Health  Delivered To: Support Person  Method of Delivery: Telephone    Selected Continued Care - Admitted Since 6/16/2021     Destination    No services have been selected for the patient.              Durable Medical Equipment    No services have been selected for the patient.              Dialysis/Infusion    No services have been selected for the patient.              Home Medical Care Coordination complete    Service Provider Selected Services Address Phone Fax Patient Preferred     [facility]  [facility] [address] [address] [phone] --          Therapy    No services have been selected for the patient.              Community Resources    No services have been selected for the patient.              Community & DME    No services have been selected for the patient.                       Final Discharge Disposition Code: 06 - home with home health care

## 2021-06-20 NOTE — OUTREACH NOTE
Prep Survey      Responses   [facility] facility patient discharged from?  [facility]   Is LACE score < 7 ?  No   Emergency Room discharge w/ pulse ox?  No   Eligibility  Readm Mgmt   Discharge diagnosis  Suspected cerebrovascular accident (CVA)    Does the patient have one of the following disease processes/diagnoses(primary or secondary)?  Stroke (TIA)   Does the patient have Home health ordered?  Yes   What is the Home health agency?   [facility]   Is there a DME ordered?  No   Prep survey completed?  Yes          Lolly Lee RN

## 2021-06-23 NOTE — OUTREACH NOTE
Stroke Week 1 Survey      Responses   [facility] patient discharged from?  [facility]   Does the patient have one of the following disease processes/diagnoses(primary or secondary)?  Stroke (TIA)   Week 1 attempt successful?  No   Unsuccessful attempts  Attempt 1          Oliva Fay RN

## 2021-06-24 NOTE — HOME HEALTH
Pt lives with her daughter, uses a RW for mobility,  Per daughter pt is at basline functional mobility level  No further HHPT visits indicated/planned

## 2021-06-25 NOTE — OUTREACH NOTE
Stroke Week 1 Survey      Responses   [facility] patient discharged from?  [place]   Does the patient have one of the following disease processes/diagnoses(primary or secondary)?  Stroke (TIA)   Week 1 attempt successful?  Yes   Call start time  0949   Call end time  0953   Discharge diagnosis  Suspected cerebrovascular accident (CVA)    Is patient permission given to speak with other caregiver?  Yes   List who call center can speak with  dtr   Person spoke with today (if not patient) and relationship  dtr   Meds reviewed with patient/caregiver?  Yes   Is the patient having any side effects they believe may be caused by any medication additions or changes?  No   Does the patient have all medications ordered at discharge?  Yes   Is the patient taking all medications as directed (includes completed medication regime)?  Yes   Does the patient have a primary care provider?   Yes   Does the patient have an appointment with their PCP within 7 days of discharge?  Greater than 7 days   What is preventing the patient from scheduling follow up appointments within 7 days of discharge?  Earlier appointment not available   Nursing Interventions  Verified appointment date/time/provider   What is the Home health agency?   [facility]   Has home health visited the patient within 72 hours of discharge?  Yes   Home health comments  PT /OT does not feel their services are needed.   Psychosocial issues?  No   Does the patient require any assistance with activities of daily living such as eating, bathing, dressing, walking, etc.?  No   Does the patient have any residual symptoms from stroke/TIA?  No   Does the patient understand the diet ordered at discharge?  Yes   Did the patient receive a copy of their discharge instructions?  Yes   Nursing interventions  Reviewed instructions with patient   What is the patient's perception of their health status since discharge?  Returned to baseline/stable   Nursing interventions  Nurse provided  patient education   Is the patient able to teach back FAST for Stroke?  Yes   Is the patient/caregiver able to teach back the risk factors for a stroke?  History of TIAs   Is the patient/caregiver able to teach back signs and symptoms related to disease process for when to call PCP?  Yes   If the patient is a current smoker, are they able to teach back resources for cessation?  Not a smoker   Is the patient/caregiver able to teach back the hierarchy of who to call/visit for symptoms/problems? PCP, Specialist, Home health nurse, Urgent Care, ED, 911  Yes   Week 1 call completed?  Yes   Revoked  No further contact(revokes)-requires comment   Is the patient interested in additional calls from an ambulatory ?  NOTE:  applies to high risk patients requiring additional follow-up.  No   Graduated/Revoked comments  has 24hr care, back to baseline          Oliva Fay RN

## 2021-07-19 NOTE — ED PROVIDER NOTES
Subjective   Pt presents with AMS today.  Just PTA family noticed incoherent speech, deep breathing and leaning to the right.  They called EMS to bring her here.  Her symptoms resolved en route and pt has no complaints on arrival.    She denies fever, cough, vomiting, diarrhea, abd pain, chest pain, dyspnea.    She has had similar symptoms with UTIs.  She was admitted here last month with similar symptoms with concern for stroke.      History provided by:  Patient and relative      Review of Systems   Constitutional: Negative for fever.   Respiratory: Negative for cough and shortness of breath.    Cardiovascular: Negative for chest pain.   Gastrointestinal: Negative for abdominal pain and vomiting.   Neurological: Positive for speech difficulty.   All other systems reviewed and are negative.      Past Medical History:   Diagnosis Date   • A-fib (CMS/HCC)    • Cancer (CMS/HCC)    • Chronic back pain 10/17/2016    Chronic back and left leg pain, pain management per Dr. Lynch.   • Coronary artery disease 10/17/2016    History of myocardial infarction, 1984. Left heart catheterization, 2007, to unknown vessel. Nuclear stress test, 03/22/2010:  Very small sized defect and mild intensity in apical portion of anterior septal region.  EF 68%. Non-ST elevation MI, 02/22/2016 with cardiac catheterization by Lobito Steele MD with placement of a drug-eluting stent to the mid-LAD, drug-eluting to the mid-RCA, and a drug-eluting stent to the PDA.  The stent in the LAD, 2.5 x 28 mm Xience drug-eluting stent; mid-right, 3.0 x 33 mm Xience drug-eluting stent.  PDA, 2.5 x 28 mm Xience drug-eluting stent.  EF 30% with anterolateral apical and inferior hypokinesis.     • Dementia (CMS/HCC)    • Disease of thyroid gland    • Heart attack (CMS/HCC)    • History of UTI    • Hyperlipidemia    • Hypertension    • Lymphoma (CMS/HCC)    • Lymphoma (CMS/HCC)     Non-Hodgkin’s lymphoma   • Stroke (CMS/HCC)    • Syncope 10/17/2016     Presyncope        Allergies   Allergen Reactions   • Atorvastatin Myalgia       Past Surgical History:   Procedure Laterality Date   • CARDIAC CATHETERIZATION N/A 10/12/2016    Procedure: Left Heart Cath;  Surgeon: Nikia Chambers MD;  Location: [facility] CATH INVASIVE LOCATION;  Service:    • ESOPHAGEAL DILATATION     • NASAL SEPTAL RECONSTRUCTION     • TONSILLECTOMY         Family History   Problem Relation Age of Onset   • Heart disease Father    • Heart attack Father    • Heart disease Maternal Grandmother    • Heart disease Maternal Grandfather    • No Known Problems Mother        Social History     Socioeconomic History   • Marital status:      Spouse name: Not on file   • Number of children: Not on file   • Years of education: Not on file   • Highest education level: Not on file   Tobacco Use   • Smoking status: Never Smoker   • Smokeless tobacco: Never Used   Substance and Sexual Activity   • Alcohol use: Yes     Alcohol/week: 1.0 - 2.0 standard drinks     Types: 1 - 2 Glasses of wine per week     Comment: Pt's daughter will no longer let her have any alcohol   • Drug use: Never   • Sexual activity: Never           Objective   Physical Exam  Vitals and nursing note reviewed.   Constitutional:       General: She is not in acute distress.     Appearance: Normal appearance. She is not ill-appearing.   HENT:      Head: Normocephalic and atraumatic.      Mouth/Throat:      Mouth: Mucous membranes are moist.   Eyes:      General: No scleral icterus.        Right eye: No discharge.         Left eye: No discharge.      Conjunctiva/sclera: Conjunctivae normal.   Cardiovascular:      Rate and Rhythm: Normal rate. Rhythm irregular.      Heart sounds: No murmur heard.     Pulmonary:      Effort: Pulmonary effort is normal. No respiratory distress.      Breath sounds: Normal breath sounds. No wheezing.   Abdominal:      General: Bowel sounds are normal. There is no distension.      Palpations: Abdomen is soft.       Tenderness: There is no abdominal tenderness. There is no guarding or rebound.   Musculoskeletal:         General: No swelling. Normal range of motion.      Cervical back: Normal range of motion and neck supple.   Skin:     General: Skin is warm and dry.      Findings: No rash.   Neurological:      General: No focal deficit present.      Mental Status: She is alert and oriented to person, place, and time. Mental status is at baseline.      Comments: Speech fluent and articulate.  No facial droop.  5/5 strength in arms and legs.  Normal .  Mentation normal.     Psychiatric:         Mood and Affect: Mood normal.         Behavior: Behavior normal.         Thought Content: Thought content normal.         Procedures           ED Course         EKG AF.  Labs benign.  UA negative.  CT head negative.    Reviewed records, one month ago she was admitted here and had echo, CTA head/neck, MRI, looks like the whole stroke evaluation that occurs at our facility and it was negative.  She is on DAPT as well as Xarelto and a statin.    She currently has no complaints, and as she just had stroke workup and is on maximal medical therapy I think there is no benefit to readmission.  Pt agrees.     Patient stable on serial rechecks.  Discussed findings, concerns, plan of care, expected course, reasons to return and followup.  Provided the opportunity to ask questions.                                    MDM  Number of Diagnoses or Management Options     Amount and/or Complexity of Data Reviewed  Clinical lab tests: reviewed and ordered  Tests in the radiology section of CPT®: reviewed and ordered  Decide to obtain previous medical records or to obtain history from someone other than the patient: yes  Obtain history from someone other than the patient: yes  Review and summarize past medical records: yes  Independent visualization of images, tracings, or specimens: yes        Final diagnoses:   Altered mental status, unspecified  altered mental status type       ED Disposition  ED Disposition     ED Disposition Condition Comment    Discharge Stable           Martha Montague PA  [address] [address]  [phone]    In 2 days      [facility]  [address] [address] [phone]    If symptoms worsen         Medication List      Changed    rosuvastatin 20 MG tablet  Commonly known as: CRESTOR  TAKE 1 TABLET BY MOUTH EVERY NIGHT.  What changed: how much to take             Andreas Mott MD  07/19/21 0108

## 2021-11-26 PROBLEM — A41.9 SEPSIS (HCC): Status: ACTIVE | Noted: 2021-01-01

## 2021-11-26 PROBLEM — R79.89 ELEVATED SERUM CREATININE: Status: ACTIVE | Noted: 2021-01-01

## 2021-11-26 PROBLEM — K52.9 COLITIS, ACUTE: Status: ACTIVE | Noted: 2021-01-01

## 2021-11-26 PROBLEM — E87.20 METABOLIC ACIDOSIS: Status: ACTIVE | Noted: 2021-01-01

## 2021-11-27 PROBLEM — A04.72 C. DIFFICILE COLITIS: Status: ACTIVE | Noted: 2021-01-01

## 2021-12-01 NOTE — PROGRESS NOTES
Hospice Progress Note    Patient Name: Sarah Kennedy   : 1929  Gender: female    Code Status: comfort measures    Date of Admission: 2021    Subjective:    89yoF admitted inpt Hospice  for sepsis.     Comfortable overnight. No prns needed. Comfortable today upon visit; granddtr at bedside.       ROS:  Review of Systems   Unable to perform ROS: Acuity of condition       Reviewed current scheduled and prn medications for route, type, dose and frequency.     •  acetaminophen  •  furosemide  •  glycopyrrolate  •  haloperidol lactate  •  ketorolac  •  LORazepam  •  Morphine **OR** [DISCONTINUED] morphine  •  ondansetron  •  Scopolamine  •  sodium chloride    Objective:   Pulse 92   SpO2 95%      PPS: Palliative Performance Scale score as of 2021, 14:29 EST is 10% based on the following measures:   Ambulation: Totally bed bound  Activity and Evidence of Disease: Unable to do any work, extensive evidence of disease  Self-Care: Total care  Intake:  Mouth care only  LOC: Drowsy or coma      Physical Exam:  Constitutional: Comfortable  Eyes: closed  HENT: NCAT, dry MM  Neck:  trachea midline  Respiratory: CTA; respirations nonlabored  Cardiovascular: tachy, HR 120s  Gastrointestinal: NT/ND, BSx4  Musculoskeletal: hands cold, BUE + edema  Psychiatric: No facial grimacing, no moaning - very comfortable  Neurologic: Does not follow commands  Skin: No rashes, pale        Sepsis (HCC)      Assessment/Plan:     89yoF admitted inpt Hospice  for sepsis.      Pain, nos, existential  Anxiety  Dyspnea  Congestion  EOLC    Continue current plan of care    Monitor for changing needs    Discussion at bedside with granddtr regarding end of life s/s and symptom mgmt.     Coordinated care with Nursing and Hospice IDT.    Discharge Disposition: EOLC    Total Visit Time: 30min  Face to Face Time: 15min    Justification for care:  Patient meets criteria for acute in-patient care with required nursing assessment  and interventions for symptoms with IV medications.      Lianna Price DNP, MHA, APRN  [facility] Care Navigators  Hospice and Palliative Care Nurse Practitioner  12/01/21  11:42 EST

## 2021-12-01 NOTE — PROGRESS NOTES
Hospice Progress Note    Patient Name: Sarah Kennedy   : 1929  Gender: female    Code Status: comfort measures    Date of Admission: 2021    Subjective:    89yoF admitted in Hospice  for sepsis.     Pt now on N5B; comfortable. Dtr asleep on couch at bedside. Both pt and dtr undisturbed during visit/exam.    - Intake/Output  *PO: NPO  *Urine: 300ml      ROS:  Review of Systems   Unable to perform ROS: Acuity of condition       Reviewed current scheduled and prn medications for route, type, dose and frequency.     •  acetaminophen  •  furosemide  •  glycopyrrolate  •  haloperidol lactate  •  ketorolac  •  LORazepam  •  Morphine **OR** [DISCONTINUED] morphine  •  ondansetron  •  Scopolamine  •  sodium chloride    Objective:   Pulse 92   SpO2 95%      PPS: Palliative Performance Scale score as of 2021, 16:47 EST is 10% based on the following measures:   Ambulation: Totally bed bound  Activity and Evidence of Disease: Unable to do any work, extensive evidence of disease  Self-Care: Total care  Intake:  Mouth care only  LOC: Drowsy or coma      Physical Exam:  Constitutional: unresponsive  Eyes: closed  HENT: NCAT, dry MM  Neck:  trachea midline  Respiratory: SHALLOW respirations; no audible congestion; periods of apnea noted  Cardiovascular: tachy, HR 110s  Gastrointestinal: NT/ND, BSx4  Musculoskeletal: hands cold, BUE + edema, pitting around elbow   Psychiatric: No facial grimacing, no moaning - very comfortable  Neurologic: Does not follow commands  Skin: No rashes, pale        Sepsis (HCC)      Assessment/Plan:     89yoF admitted in Hospice  for sepsis.      Pain, nos, existential  Anxiety  Dyspnea  Congestion  EOLC    Continue current plan of care    Monitor for changing needs    Prns reviewed/adjusted for comfort    Coordinated care with Nursing and Hospice IDT    Discharge Disposition: EOLC    Total Visit Time: 10min  Face to Face Time: 2min    Justification for care:  Patient  meets criteria for acute in-patient care with required nursing assessment and interventions for symptoms with IV medications.      Lianna Price DNP, MHA, APRN  [facility] Care Navigators  Hospice and Palliative Care Nurse Practitioner  12/01/21  16:47 EST

## 2021-12-01 NOTE — PROGRESS NOTES
Continued Stay Note  [facility]     Patient Name: Sarah Kennedy  MRN: [id number]  Today's Date: 12/1/2021    Admit Date: 11/28/2021     Discharge Plan     Row Name 12/01/21 0851       Plan    Plan IPU assessment    Support Person 12/1 PPS 10 % Sarah Kennedy is a 89 y/o  female who presented to [facility] ED on 11/26 with diarrhea multiple times a day, dark stool, and progressive weakness, diagnosis: sepsis. Pt condition has not changed. Pt appears comfortable. Skin is cool with no mottling noted. Long apnea spells continue. Dtr Stephanie and Granddaughter Leigh at bedside. Dtr is very exhausted and educated on self care. Pt is actively dying. No prns needed. Patient will receive general inpatient hospice services for symptom management of pain and agitation at end of life                 Discharge Codes    No documentation.                     Nicole Pedraza RN

## 2021-12-01 NOTE — PLAN OF CARE
Goal Outcome Evaluation:  Plan of Care Reviewed With: patient        Progress: declining  Outcome Summary: Daughter at bedside. no PRN's needed. Pt Appears comfortable. Will continue hospice care.

## 2021-12-02 NOTE — PROGRESS NOTES
Hospice Progress Note    Patient Name: Sarah Kennedy   : 1929  Gender: female    Code Status: comfort measures    Date of Admission: 2021    Subjective:    89yoF admitted in Hospice  for sepsis.     Overnight notes reviewed: Pt appears comfortabe with scheduled meds. Remains apneic.  Extremities cool. No mottling noted.  No prns. Daugher at bedside.    Pt comfortable upon visit; responsive to oral care - closes mouth. At baseline, per family, pt is not one that likes to be touched, was very independent. Granddtrs x2 at bedside. Dtr has gone home and plans to return tonight.    - PRNs:  Ativan 0.5mg x1 (0800)  Morphine 4mg x3 (1539, 1828, 0800)    - Held: none    - Intake/Output  *PO: NPO  *Urine: 350ml  *LBM: , moderate      ROS:  Review of Systems   Unable to perform ROS: Acuity of condition       Reviewed current scheduled and prn medications for route, type, dose and frequency.     •  acetaminophen  •  furosemide  •  glycopyrrolate  •  haloperidol lactate  •  ketorolac  •  LORazepam  •  Morphine **OR** [DISCONTINUED] morphine  •  ondansetron  •  Scopolamine  •  sodium chloride    Objective:   Pulse 92   SpO2 95%      PPS: Palliative Performance Scale score as of 2021, 14:36 EST is 10% based on the following measures:   Ambulation: Totally bed bound  Activity and Evidence of Disease: Unable to do any work, extensive evidence of disease  Self-Care: Total care  Intake:  Mouth care only  LOC: Drowsy or coma      Physical Exam:  Physical Exam  Constitutional:       General: She is not in acute distress.     Appearance: She is ill-appearing.   HENT:      Mouth/Throat:      Mouth: Mucous membranes are dry.   Eyes:      Comments: closed   Cardiovascular:      Rate and Rhythm: Rhythm irregular.      Comments: Tachy, HR 120s  Pulmonary:      Comments: even respirations; intermittant audible congestion -- bothersome to pt - she was trying to clear her throat; CTA; one period of apnea  noted  Abdominal:      Palpations: Abdomen is soft.      Comments: Hypoactive BSx4   Musculoskeletal:      Cervical back: Neck supple.      Comments: BUE edema   Skin:     General: Skin is dry.      Coloration: Skin is pale.   Neurological:      Comments: Does not follow commands; responsive to oral care   Psychiatric:      Comments: No facial grimacing; no moaning         Sepsis (HCC)      Assessment/Plan:     89yoF admitted in Hospice 11/28 for sepsis.      Pain, nos, existential  Anxiety  Dyspnea  Congestion  EOLC    Ativan 0.5mg q4h    Morphine 4mg q4h    Lasix 20mg q8h ---- change to bumex 0.5mg q8h d/t supply    Palliative oral rinse scheduled and prn    Eye gtts scheduled and prn    Scop patch    Prns reviewed/adjusted for comfort    Discussion at bedside with granddtrs regarding end of life s/s and symptom mgmt. Support provided. Hospice contact information shared.     Discharge Disposition: EOLC    Total Visit Time: 35min  Face to Face Time: 15min    Justification for care:  Patient meets criteria for acute in-patient care with required nursing assessment and interventions for symptoms with IV medications.      Lianna Price, CHANDA, MHA, APRN  [facility] Care Navigators  Hospice and Palliative Care Nurse Practitioner  12/02/21  11:08 EST

## 2021-12-02 NOTE — PLAN OF CARE
Problem: Adult Inpatient Plan of Care  Goal: Plan of Care Review  Outcome: Ongoing, Progressing  Flowsheets (Taken 12/2/2021 0608)  Progress: no change  Plan of Care Reviewed With: patient  Outcome Summary: Pt appears comfortabe with scheduled meds. Remains apneic.  Extremities cool. No mottling noted.  No prns. Daugher at bedside.

## 2021-12-02 NOTE — PROGRESS NOTES
Continued Stay Note  [facility]     Patient Name: Sarah Kennedy  MRN: [id number]  Today's Date: 12/2/2021    Admit Date: 11/28/2021     Discharge Plan     Row Name 12/02/21 0937       Plan    Plan IPU Assessment    Support Person 12/2 PPS 10 % Sarah Kennedy is a 89 y/o  female who presented to [facility] ED on 11/26 with diarrhea multiple times a day, dark stool, and progressive weakness, diagnosis: sepsis. Patient’s condition has decline further. Pt having long apneic spells and at times abdominal breathing. Dtr reports pt moaned 2 x before I arrived. Morphine 4 mg and Ativan 0.5 mg given by Yair BEAVERS at that time. Extremities remain cool, pulses weak. Jiang [id number] cc urine. PRN morp 4 mg x 2 /24 hr given for pain. Family are at peace with no bereavement concerns.  Currently patient remains GIP for complications of EOL care requiring skilled nursing and medical management of symptoms               Discharge Codes    No documentation.                     Nicole Pedraza RN

## 2021-12-02 NOTE — PLAN OF CARE
Goal Outcome Evaluation:           Progress: no change     Pt unresponsive. Family at bedside; encouraged self care to family. Skin pale and cool. Nothing PO. Jiang in place.

## 2021-12-03 NOTE — PLAN OF CARE
Goal Outcome Evaluation:           Progress: declining     Pt unresponsive. Skin pale and cool. Jiang in place. Nothing PO except oral care. Family at bedside throughout shift.

## 2021-12-03 NOTE — PROGRESS NOTES
Hospice Progress Note    Patient Name: Sarah Kennedy   : 1929  Gender: female    Code Status: comfort measures    Date of Admission: 2021    Subjective:    89yoF admitted in Hospice  for sepsis.     Overnight notes reviewed: Patient rested comfortably.  Apnea continues.  Jiang with minimal output. PRN given for audible congestion.  Family at bedside and appreciative of care. Continue comfort measures.    Pt comfortable today upon visit; minimal response to oral care; undisturbed by exam. Horton family at bedside.    - PRNs: Robinul 0.2mg x1    - Held: none    - Intake/Output  *PO: NPO  *Urine: 800ml  *LBM:       ROS:  Review of Systems   Unable to perform ROS: Acuity of condition       Reviewed current scheduled and prn medications for route, type, dose and frequency.     •  acetaminophen  •  furosemide  •  glycopyrrolate  •  haloperidol lactate  •  ketorolac  •  LORazepam  •  Morphine **OR** [DISCONTINUED] morphine  •  ondansetron  •  palliative care oral rinse  •  polyvinyl alcohol  •  Scopolamine  •  sodium chloride    Objective:   Pulse 92   SpO2 95%      PPS: Palliative Performance Scale score as of 12/3/2021, 12:47 EST is 10% based on the following measures:   Ambulation: Totally bed bound  Activity and Evidence of Disease: Unable to do any work, extensive evidence of disease  Self-Care: Total care  Intake:  Mouth care only  LOC: Drowsy or coma      Physical Exam:  Constitutional:       General: NAD; comfortable  HENT:      Mouth/Throat:      Mouth: dry MM - minimally responsive to oral care  Eyes:      Comments: closed, dry --- eye gtts ordered from pharm  Cardiovascular:      Rate and Rhythm: Irr irr      Comments: HR 80s  Pulmonary:      Comments: respirations are non-labored; no audible congestion; CTA; periods of apnea noted  Abdominal:      Palpations: Abdomen is soft.      Comments: do not appreciate BSx4   Musculoskeletal:      Cervical back: Neck supple.      Comments: AVINASH  edema   Skin:     General: Skin is dry.      Coloration: PALE  Neurological:      Comments: Does not follow commands; minimally responsive to oral care   Psychiatric:      Comments: No facial grimacing; no moaning       Sepsis (HCC)      Assessment/Plan:     89yoF admitted in Hospice 11/28 for sepsis.      Pain, nos, existential  Anxiety  Dyspnea  Congestion  EOLC    *No medication changes today    Continue current plan of care    Monitor for changing needs    Coordinated care with Nursing and Hospice IDT    Discussion at bedside with family regarding end of life s/s and symptom mgmt. Encouraged family to allow pt to have some private/alone time.     Discharge Disposition: EOLC    Total Visit Time: 25min  Face to Face Time: 15min    Justification for care:  Patient meets criteria for acute in-patient care with required nursing assessment and interventions for symptoms with IV medications.      Lianna Price DNP, MHA, APRN  [facility] Care Navigators  Hospice and Palliative Care Nurse Practitioner  12/03/21  11:00 EST

## 2021-12-03 NOTE — PROGRESS NOTES
Continued Stay Note  [facility]     Patient Name: Sarah Kennedy  MRN: [id number]  Today's Date: 12/3/2021    Admit Date: 11/28/2021     Discharge Plan     Row Name 12/03/21 1121       Plan    Plan Inpatient hospice    Plan Comments Visit this am to bedside. Patient remains unresponsive, actively dying.  She is noted with relaxed face, jaw, body posture, respirations.  Noted apnea of 30-40 seconds/min. Deep respirations between apneic periods.  Mottled fingers, center of knees cool.  Circumoral cyanosis.  Discussed assessment, condition, medications, eol signs/symptoms, eol communication, self care, what to possibly expect from this point forward, and food and water.  Family at bedside verbalized understanding and appreciation.  This patient continues to require gip hospice care due to she requires complex technical injectable medication delivery requiring frequent skilled nursing assessmsent, intervention, and reevaluation.  She is actively dying.  Current level of care unable to be provided in alternate setting.  Should the patient's condition remain on current trajectory it is unlikely that she will survive this hospitalization.    Final Discharge Disposition Code 51 - hospice medical facility               Discharge Codes    No documentation.                     Ailyn Berry RN

## 2021-12-03 NOTE — PLAN OF CARE
Patient rested comfortably.  Apnea continues.  Jiang with minimal output. PRN given for audible congestion.  Family at bedside and appreciative of care. Continue comfort measures.

## 2021-12-04 NOTE — PROGRESS NOTES
Continued Stay Note  [facility]     Patient Name: Sarah Kennedy  MRN: [id number]  Today's Date: 12/4/2021    Admit Date: 11/28/2021     Discharge Plan     Row Name 12/04/21 1318       Plan    Plan Inpatient hospice stay    Plan Comments Visit this am.  Noted daughter sleeping at bedside.  Patient unresponsive.  Dusky, pallor with dusky mucus membranes.  Relaxed face, jaw, body posture, respirations.  Noted apnea 20-40 seconds/min.  Rn reassures patient that she is safe, cared for.  Communicated with [facility] Staff RnDaniela.  The patient continues to meet gip criteria due to she requires injectable medications requiring frequent complex skilled nursing assessment, intervention, and reevaluation.  Current level of care unable to be provided in alternate setting. The patient is actively dying.  Should the patient's condition remain on current trajectory she will not likely survive this hospitalization.    Final Discharge Disposition Code 51 - hospice medical facility               Discharge Codes                         Ailyn Berry RN

## 2021-12-04 NOTE — PLAN OF CARE
Goal Outcome Evaluation:  Plan of Care Reviewed With: patient        Progress: no change  Outcome Summary: Patient remains unresponsive, but no groaning or moaning. Periods of apnea witnessed and skin has become colder. Daughter remains at bedside. Will continue to monitor.

## 2021-12-04 NOTE — PROGRESS NOTES
Hospice Progress Note    Patient Name: Sarah Kennedy   : 1929  Gender: female    Code Status: comfort measures    Date of Admission: 2021    Subjective:      89yoF admitted in Hospice  for sepsis.     Overnight notes reviewed: Patient remaing unresponsive, but no groaning or moaning. Periods of apnea witnessed and skin has become colder. Daughter remains at bedside. Will continue to monitor.    Today pt remains comfortable; undisturbed by exam.     - PRNs:  Robinul 0.2mg x1 (628)  Morphine 4mg x1 (1615)    - Held: none    - Intake/Output  *PO: NPO  *Urine: 800ml, 600ml  *LBM:       ROS:  Review of Systems   Unable to perform ROS: Acuity of condition       Reviewed current scheduled and prn medications for route, type, dose and frequency.     •  acetaminophen  •  furosemide  •  glycopyrrolate  •  haloperidol lactate  •  LORazepam  •  Morphine **OR** [DISCONTINUED] morphine  •  ondansetron  •  palliative care oral rinse  •  polyvinyl alcohol  •  Scopolamine  •  sodium chloride    Objective:   Pulse 92   SpO2 95%      PPS: Palliative Performance Scale score as of 2021, 10:31 EST is 10% based on the following measures:   Ambulation: Totally bed bound  Activity and Evidence of Disease: Unable to do any work, extensive evidence of disease  Self-Care: Total care  Intake:  Mouth care only  LOC: Drowsy or coma      Physical Exam:  Physical Exam:  Constitutional:       General: NAD; comfortable. Cheekbones prominent.  HENT:      Mouth/Throat:      Mouth: dry MM - very minimally responsive to oral care  Eyes:      Comments: closed, dry --- eye gtts ordered from pharm  Cardiovascular:      Rate and Rhythm: Irr irr      Comments: HR 80s; do not appreciate radial pulses  Pulmonary:      Comments: respirations are non-labored; no audible congestion; CTA; periods of apnea noted  Abdominal:      Palpations: Abdomen is soft.      Comments: do not appreciate BSx4   Musculoskeletal:      Cervical  back: Neck supple.      Comments: BUE edema   Skin:     General: Skin is dry     Coloration: PALE  Neurological:      Comments: Does not follow commands; very minimal responsive to oral care   Psychiatric:      Comments: No facial grimacing; no moaning       Sepsis (HCC)      Assessment/Plan:     89yoF admitted in Hospice 11/28 for sepsis.      Pain, nos, existential  Anxiety  Dyspnea  Congestion  EOLC    *No medication changes today     Continue current plan of care     Monitor for changing needs    *Visitor in restroom upon my visit -- I went back to room later and dtr had gone home for a bit a left a note to call. [facility]. Dtr did call Hospice office and was updated by Hospice Nurse.     Discharge Disposition: EOLC    Total Visit Time: 15min  Face to Face Time: 3min    Justification for care:  Patient meets criteria for acute in-patient care with required nursing assessment and interventions for symptoms with IV medications.      Lianna Price, DNP, MHA, APRN  [facility]  Hospice and Palliative Care Nurse Practitioner  12/04/21  11:11 EST

## 2021-12-04 NOTE — PLAN OF CARE
Goal Outcome Evaluation:  Plan of Care Reviewed With: patient        Progress: declining  Outcome Summary: PT unresponsive throughout the day, scheduled medications given, patient comfortable. PRN Robinul given x 1 with improvement.

## 2021-12-05 NOTE — SIGNIFICANT NOTE
Exam confirms with auscultation zero audible heart tones and zero audible respirations. Ms.Bobbie LIANE Kennedy was pronounced dead at 0508.  MD notified by Patient's RN.    Karyn Arguelles RN  Clinical House Supervisor  12/5/2021 05:49 EST

## 2021-12-05 NOTE — NURSING NOTE
Upon assessment at 0508, patient had no apical pulse or respirations. House supervisor was notified.

## 2021-12-05 NOTE — PLAN OF CARE
Goal Outcome Evaluation:  Plan of Care Reviewed With: family        Progress: declining  Outcome Summary: Patient remains unresponsive. Given PRNs for congestion. Daughter remains at bedside. Will continue to monitor.

## 2021-12-05 NOTE — DISCHARGE SUMMARY
Date of Death:  12/5/2021  Time of Death: 0508    Presenting Problem/History of Present Illness    Sepsis (HCC)      Hospital Course    Per Hosptialist Discharge:     Patient is a 89-year-old who was admitted with dehydration and diarrhea.  Her symptoms started several days ago prior to Thanksgiving.  She continued to worsen and her weakness continued to worsen so her daughter whom she lives with brought her to the emergency department.  About 2 weeks ago the patient started declining with decreased mobility and she was diagnosed with an upper respiratory infection and given a prescription for antibiotic.  About a week after she started the antibiotic she started having diarrhea.  Her weakness has continued to worsen and she has become full care.  Patient's daughter is concerned that she is no longer able to care for her at home.  She is certainly interested in palliative care and may consider long-term care facility if her mother's mobility does not improve.     11/27 - This morning her C. difficile returned as positive.     11/28 -patient seen by palliative care.  Patient was able to take her oral vancomycin with liquid yesterday but today has been unable to take p.o. and is unresponsive.  No bowel movements documented since yesterday.  Had a little p.o. intake yesterday but none today.  She appears to have rapidly declined with agonal breathing     [end of copied text]     Ms. Kennedy was admitted to in Hospice on 11/28/2021 for mgmt of acute symptoms 2/2 sepsis.      Pt not comfortable upon visit - declining - moaning, tachypneic.     Social History:  Social History     Tobacco Use   • Smoking status: Never Smoker   • Smokeless tobacco: Never Used   Substance Use Topics   • Alcohol use: Yes     Alcohol/week: 1.0 - 2.0 standard drink     Types: 1 - 2 Glasses of wine per week     Comment: Pt's daughter will no longer let her have any alcohol         Consults:   Consults     Date and Time Order Name Status  Description    11/27/2021 11:44 AM Inpatient Palliative Care MD Consult Completed         Exam confirms with auscultation zero audible heart tones and zero audible respirations. Ms.Bobbie LIANE Kennedy was pronounced dead at 0508.  MD notified by Patient's RN.     Karyn Arguelles, RN  Clinical House Supervisor  12/5/2021 05:49 EST      Lianna Price, CHANDA, MHA, APRN  [facility]  Hospice and Palliative Care Nurse Practitioner  12/05/21  10:36 EST